# Patient Record
Sex: FEMALE | Race: WHITE | NOT HISPANIC OR LATINO | Employment: PART TIME | URBAN - METROPOLITAN AREA
[De-identification: names, ages, dates, MRNs, and addresses within clinical notes are randomized per-mention and may not be internally consistent; named-entity substitution may affect disease eponyms.]

---

## 2023-10-04 DIAGNOSIS — R05.9 COUGH, UNSPECIFIED TYPE: Primary | ICD-10-CM

## 2023-10-04 RX ORDER — ALBUTEROL SULFATE 90 UG/1
AEROSOL, METERED RESPIRATORY (INHALATION)
Qty: 25.5 G | Refills: 0 | Status: SHIPPED | OUTPATIENT
Start: 2023-10-04

## 2023-11-17 DIAGNOSIS — E03.9 HYPOTHYROIDISM, UNSPECIFIED TYPE: Primary | ICD-10-CM

## 2023-11-17 RX ORDER — LEVOTHYROXINE SODIUM 100 UG/1
100 TABLET ORAL
Qty: 90 TABLET | Refills: 1 | Status: SHIPPED | OUTPATIENT
Start: 2023-11-17

## 2023-12-14 ENCOUNTER — TELEPHONE (OUTPATIENT)
Dept: PRIMARY CARE | Facility: CLINIC | Age: 58
End: 2023-12-14

## 2023-12-14 DIAGNOSIS — L40.9 PSORIASIS: Primary | ICD-10-CM

## 2023-12-14 NOTE — TELEPHONE ENCOUNTER
Patient's psoriasis has flared up again. She is wondering she could get a prescription for the antibiotic and steroid that you had prescribed in the past. [facility] in [place].

## 2023-12-15 RX ORDER — DOXYCYCLINE 100 MG/1
100 CAPSULE ORAL 2 TIMES DAILY
Qty: 20 CAPSULE | Refills: 0 | Status: SHIPPED | OUTPATIENT
Start: 2023-12-15 | End: 2023-12-25

## 2023-12-15 RX ORDER — METHYLPREDNISOLONE 4 MG/1
TABLET ORAL
Qty: 21 TABLET | Refills: 0 | Status: SHIPPED | OUTPATIENT
Start: 2023-12-15 | End: 2023-12-22

## 2024-05-01 DIAGNOSIS — F41.9 ANXIETY: Primary | ICD-10-CM

## 2024-05-01 RX ORDER — ESCITALOPRAM OXALATE 10 MG/1
10 TABLET ORAL DAILY
Qty: 90 TABLET | Refills: 0 | Status: SHIPPED | OUTPATIENT
Start: 2024-05-01

## 2024-05-17 ENCOUNTER — APPOINTMENT (OUTPATIENT)
Dept: PRIMARY CARE | Facility: CLINIC | Age: 59
End: 2024-05-17

## 2024-05-22 ENCOUNTER — APPOINTMENT (OUTPATIENT)
Dept: PRIMARY CARE | Facility: CLINIC | Age: 59
End: 2024-05-22
Payer: COMMERCIAL

## 2024-07-26 ENCOUNTER — TELEPHONE (OUTPATIENT)
Dept: PRIMARY CARE | Facility: CLINIC | Age: 59
End: 2024-07-26
Payer: COMMERCIAL

## 2024-07-26 DIAGNOSIS — L03.115 CELLULITIS OF RIGHT LOWER EXTREMITY: Primary | ICD-10-CM

## 2024-07-26 RX ORDER — DOXYCYCLINE 100 MG/1
100 CAPSULE ORAL 2 TIMES DAILY
Qty: 20 CAPSULE | Refills: 0 | Status: SHIPPED | OUTPATIENT
Start: 2024-07-26 | End: 2024-08-02 | Stop reason: SDUPTHER

## 2024-07-26 RX ORDER — TRIAMCINOLONE ACETONIDE 1 MG/G
CREAM TOPICAL 2 TIMES DAILY
Qty: 80 G | Refills: 1 | Status: SHIPPED | OUTPATIENT
Start: 2024-07-26

## 2024-07-26 NOTE — TELEPHONE ENCOUNTER
Patient is requesting an antibiotic and steroid for the cellulitis in her legs.  First available appointment was 08/02/24 which she will come in for but she is very uncomfortable right now.  She is also requesting a refill of the steroid cream she was prescribed.  Unsure of the name, not on med list.

## 2024-08-02 ENCOUNTER — OFFICE VISIT (OUTPATIENT)
Dept: PRIMARY CARE | Facility: CLINIC | Age: 59
End: 2024-08-02
Payer: COMMERCIAL

## 2024-08-02 VITALS
WEIGHT: 279 LBS | SYSTOLIC BLOOD PRESSURE: 154 MMHG | HEIGHT: 62 IN | BODY MASS INDEX: 51.34 KG/M2 | HEART RATE: 96 BPM | DIASTOLIC BLOOD PRESSURE: 64 MMHG | OXYGEN SATURATION: 94 %

## 2024-08-02 DIAGNOSIS — F41.9 ANXIETY: ICD-10-CM

## 2024-08-02 DIAGNOSIS — E11.9 TYPE 2 DIABETES MELLITUS WITHOUT COMPLICATION, WITHOUT LONG-TERM CURRENT USE OF INSULIN (MULTI): ICD-10-CM

## 2024-08-02 DIAGNOSIS — Z12.31 SCREENING MAMMOGRAM FOR BREAST CANCER: ICD-10-CM

## 2024-08-02 DIAGNOSIS — E03.9 HYPOTHYROIDISM, UNSPECIFIED TYPE: ICD-10-CM

## 2024-08-02 DIAGNOSIS — L03.115 CELLULITIS OF RIGHT LOWER EXTREMITY: ICD-10-CM

## 2024-08-02 DIAGNOSIS — R60.0 BILATERAL LEG EDEMA: ICD-10-CM

## 2024-08-02 DIAGNOSIS — Z12.31 BREAST CANCER SCREENING BY MAMMOGRAM: ICD-10-CM

## 2024-08-02 DIAGNOSIS — R05.9 COUGH, UNSPECIFIED TYPE: ICD-10-CM

## 2024-08-02 DIAGNOSIS — L40.9 PSORIASIS: Primary | ICD-10-CM

## 2024-08-02 LAB — POC HEMOGLOBIN A1C: 6.9 % (ref 4.2–6.5)

## 2024-08-02 PROCEDURE — 3077F SYST BP >= 140 MM HG: CPT | Performed by: PHYSICIAN ASSISTANT

## 2024-08-02 PROCEDURE — 3008F BODY MASS INDEX DOCD: CPT | Performed by: PHYSICIAN ASSISTANT

## 2024-08-02 PROCEDURE — 3078F DIAST BP <80 MM HG: CPT | Performed by: PHYSICIAN ASSISTANT

## 2024-08-02 PROCEDURE — 4010F ACE/ARB THERAPY RXD/TAKEN: CPT | Performed by: PHYSICIAN ASSISTANT

## 2024-08-02 PROCEDURE — 99214 OFFICE O/P EST MOD 30 MIN: CPT | Performed by: PHYSICIAN ASSISTANT

## 2024-08-02 PROCEDURE — 83036 HEMOGLOBIN GLYCOSYLATED A1C: CPT | Performed by: PHYSICIAN ASSISTANT

## 2024-08-02 RX ORDER — SIMVASTATIN 40 MG/1
40 TABLET, FILM COATED ORAL EVERY 24 HOURS
Qty: 90 TABLET | Refills: 1 | Status: SHIPPED | OUTPATIENT
Start: 2024-08-02

## 2024-08-02 RX ORDER — METFORMIN HYDROCHLORIDE 500 MG/1
500 TABLET ORAL
Qty: 180 TABLET | Refills: 1 | Status: SHIPPED | OUTPATIENT
Start: 2024-08-02

## 2024-08-02 RX ORDER — SIMVASTATIN 40 MG/1
40 TABLET, FILM COATED ORAL EVERY 24 HOURS
COMMUNITY
End: 2024-08-02 | Stop reason: SDUPTHER

## 2024-08-02 RX ORDER — FLUTICASONE PROPIONATE AND SALMETEROL 500; 50 UG/1; UG/1
POWDER RESPIRATORY (INHALATION)
COMMUNITY
End: 2024-08-02 | Stop reason: SDUPTHER

## 2024-08-02 RX ORDER — POTASSIUM CHLORIDE 750 MG/1
CAPSULE, EXTENDED RELEASE ORAL
COMMUNITY
End: 2024-08-02 | Stop reason: SDUPTHER

## 2024-08-02 RX ORDER — ESCITALOPRAM OXALATE 10 MG/1
10 TABLET ORAL DAILY
Qty: 90 TABLET | Refills: 0 | Status: SHIPPED | OUTPATIENT
Start: 2024-08-02

## 2024-08-02 RX ORDER — SPIRONOLACTONE 100 MG/1
100 TABLET, FILM COATED ORAL DAILY
Qty: 90 TABLET | Refills: 1 | Status: SHIPPED | OUTPATIENT
Start: 2024-08-02

## 2024-08-02 RX ORDER — TIRZEPATIDE 5 MG/.5ML
5 INJECTION, SOLUTION SUBCUTANEOUS
Qty: 2 ML | Refills: 2 | Status: SHIPPED | OUTPATIENT
Start: 2024-09-01

## 2024-08-02 RX ORDER — LEVOTHYROXINE SODIUM 100 UG/1
100 TABLET ORAL
Qty: 90 TABLET | Refills: 1 | Status: SHIPPED | OUTPATIENT
Start: 2024-08-02

## 2024-08-02 RX ORDER — FLUTICASONE PROPIONATE AND SALMETEROL 500; 50 UG/1; UG/1
1 POWDER RESPIRATORY (INHALATION)
Qty: 60 EACH | Refills: 3 | Status: SHIPPED | OUTPATIENT
Start: 2024-08-02 | End: 2025-08-02

## 2024-08-02 RX ORDER — LOSARTAN POTASSIUM 50 MG/1
50 TABLET ORAL DAILY
Qty: 90 TABLET | Refills: 3 | Status: SHIPPED | OUTPATIENT
Start: 2024-08-02 | End: 2025-08-02

## 2024-08-02 RX ORDER — DOXYCYCLINE 100 MG/1
100 CAPSULE ORAL 2 TIMES DAILY
Qty: 20 CAPSULE | Refills: 0 | Status: SHIPPED | OUTPATIENT
Start: 2024-08-02 | End: 2024-08-12

## 2024-08-02 RX ORDER — TIRZEPATIDE 2.5 MG/.5ML
2.5 INJECTION, SOLUTION SUBCUTANEOUS
Qty: 2 ML | Refills: 0 | Status: SHIPPED | OUTPATIENT
Start: 2024-08-04

## 2024-08-02 RX ORDER — METFORMIN HYDROCHLORIDE 500 MG/1
500 TABLET ORAL
COMMUNITY
End: 2024-08-02 | Stop reason: SDUPTHER

## 2024-08-02 RX ORDER — LOSARTAN POTASSIUM 50 MG/1
50 TABLET ORAL
COMMUNITY
End: 2024-08-02 | Stop reason: SDUPTHER

## 2024-08-02 RX ORDER — APREMILAST 10-20-30MG
1 KIT ORAL 2 TIMES DAILY
Qty: 55 EACH | Refills: 0 | Status: SHIPPED | OUTPATIENT
Start: 2024-08-02 | End: 2024-08-30

## 2024-08-02 RX ORDER — SPIRONOLACTONE 100 MG/1
100 TABLET, FILM COATED ORAL DAILY
COMMUNITY
End: 2024-08-02 | Stop reason: SDUPTHER

## 2024-08-02 RX ORDER — FUROSEMIDE 40 MG/1
40 TABLET ORAL 2 TIMES DAILY
COMMUNITY
End: 2024-08-02 | Stop reason: SDUPTHER

## 2024-08-02 RX ORDER — FUROSEMIDE 40 MG/1
40 TABLET ORAL 2 TIMES DAILY
Qty: 90 TABLET | Refills: 1 | Status: SHIPPED | OUTPATIENT
Start: 2024-08-02

## 2024-08-02 RX ORDER — ALBUTEROL SULFATE 90 UG/1
2 AEROSOL, METERED RESPIRATORY (INHALATION) EVERY 6 HOURS PRN
Qty: 25.5 G | Refills: 0 | Status: SHIPPED | OUTPATIENT
Start: 2024-08-02

## 2024-08-02 RX ORDER — POTASSIUM CHLORIDE 750 MG/1
10 CAPSULE, EXTENDED RELEASE ORAL DAILY
Qty: 90 CAPSULE | Refills: 1 | Status: SHIPPED | OUTPATIENT
Start: 2024-08-02

## 2024-08-02 ASSESSMENT — PATIENT HEALTH QUESTIONNAIRE - PHQ9
SUM OF ALL RESPONSES TO PHQ9 QUESTIONS 1 AND 2: 0
1. LITTLE INTEREST OR PLEASURE IN DOING THINGS: NOT AT ALL
2. FEELING DOWN, DEPRESSED OR HOPELESS: NOT AT ALL

## 2024-08-02 ASSESSMENT — PAIN SCALES - GENERAL: PAINLEVEL: 3

## 2024-08-02 NOTE — PROGRESS NOTES
"Subjective   Patient ID: Kay Pike is a 59 y.o. female who presents for cellulitis.    60 y/o seen for follow up -   Currently with cellulitis to LE due to chronic lymphedema.   Due for A1C, labs.   Has had lapse in medications due to prior lapse in insurance. Was caring for her  since passed away.            Review of Systems   All other systems reviewed and are negative.      Objective   /64   Pulse 96   Ht 1.575 m (5' 2\")   Wt 127 kg (279 lb)   SpO2 94%   BMI 51.03 kg/m²     Physical Exam  Constitutional:       Appearance: Normal appearance. She is obese.   Cardiovascular:      Rate and Rhythm: Normal rate and regular rhythm.   Neurological:      Mental Status: She is alert.         Assessment/Plan   Diagnoses and all orders for this visit:  Psoriasis  -     apremilast (Otezla Starter) 10 mg (4)-20 mg (4)-30 mg (47) tablets,dose pack tablet therapy pack; Take 1 tablet by mouth 2 times a day for 28 days. Do not crush, chew, or split tablets.  Type 2 diabetes mellitus without complication, without long-term current use of insulin (Multi)  -     POCT glycosylated hemoglobin (Hb A1C) manually resulted  -     simvastatin (Zocor) 40 mg tablet; Take 1 tablet (40 mg) by mouth once every 24 hours.  -     losartan (Cozaar) 50 mg tablet; Take 1 tablet (50 mg) by mouth once daily.  -     metFORMIN (Glucophage) 500 mg tablet; Take 1 tablet (500 mg) by mouth 2 times daily (morning and late afternoon).  -     tirzepatide (Mounjaro) 2.5 mg/0.5 mL pen injector; Inject 2.5 mg under the skin 1 (one) time per week.  -     Comprehensive metabolic panel; Future  -     Lipid panel; Future  -     Hemoglobin A1c; Future  -     tirzepatide (Mounjaro) 5 mg/0.5 mL pen injector; Inject 5 mg under the skin 1 (one) time per week. Do not fill before September 1, 2024.  Hypothyroidism, unspecified type  -     levothyroxine (Synthroid, Levoxyl) 100 mcg tablet; Take 1 tablet (100 mcg) by mouth once daily in the morning. Take " before meals. Take on an empty stomach  Cellulitis of right lower extremity  -     doxycycline (Vibramycin) 100 mg capsule; Take 1 capsule (100 mg) by mouth 2 times a day for 10 days. Take with at least 8 ounces (large glass) of water, do not lie down for 30 minutes after  Anxiety  -     escitalopram (Lexapro) 10 mg tablet; Take 1 tablet (10 mg) by mouth once daily.  Cough, unspecified type  -     fluticasone propion-salmeteroL (Advair Diskus) 500-50 mcg/dose diskus inhaler; Inhale 1 puff 2 times a day.  -     albuterol 90 mcg/actuation inhaler; Inhale 2 puffs every 6 hours if needed for wheezing.  Bilateral leg edema  -     spironolactone (Aldactone) 100 mg tablet; Take 1 tablet (100 mg) by mouth once daily.  -     potassium chloride ER (Micro-K) 10 mEq ER capsule; Take 1 capsule (10 mEq) by mouth once daily. Do not crush or chew.  -     furosemide (Lasix) 40 mg tablet; Take 1 tablet (40 mg) by mouth 2 times a day.  Breast cancer screening by mammogram  Screening mammogram for breast cancer  -     BI mammo bilateral screening tomosynthesis; Future

## 2024-08-05 NOTE — TELEPHONE ENCOUNTER
[facility] called stating medication   apremilast (Otezla Starter) 10 mg (4)-20 mg (4)-30 mg (47) tablets,dose pack tablet therapy pack     Instructions state to take x2 daily --- pharmacist states usually they only take x1 on the first day --- verifying directions

## 2024-08-21 ENCOUNTER — TELEPHONE (OUTPATIENT)
Dept: PRIMARY CARE | Facility: CLINIC | Age: 59
End: 2024-08-21
Payer: COMMERCIAL

## 2024-08-21 DIAGNOSIS — E11.9 TYPE 2 DIABETES MELLITUS WITHOUT COMPLICATION, WITHOUT LONG-TERM CURRENT USE OF INSULIN (MULTI): ICD-10-CM

## 2024-08-21 RX ORDER — TIRZEPATIDE 2.5 MG/.5ML
2.5 INJECTION, SOLUTION SUBCUTANEOUS
Qty: 2 ML | Refills: 0 | Status: SHIPPED | OUTPATIENT
Start: 2024-08-25

## 2024-08-21 NOTE — TELEPHONE ENCOUNTER
[facility] called stating medication   tirzepatide (Mounjaro) 5 mg/0.5 mL pen injector   Insurance would not be covered     However, they state if the patient goes through [facility] insurance will cover medication -- [facility] can be reached at [phone]

## 2024-08-26 ENCOUNTER — APPOINTMENT (OUTPATIENT)
Dept: PRIMARY CARE | Facility: CLINIC | Age: 59
End: 2024-08-26
Payer: COMMERCIAL

## 2024-09-13 ENCOUNTER — OFFICE VISIT (OUTPATIENT)
Dept: PRIMARY CARE | Facility: CLINIC | Age: 59
End: 2024-09-13
Payer: COMMERCIAL

## 2024-09-13 VITALS
OXYGEN SATURATION: 93 % | WEIGHT: 279.4 LBS | HEART RATE: 102 BPM | SYSTOLIC BLOOD PRESSURE: 142 MMHG | DIASTOLIC BLOOD PRESSURE: 68 MMHG | HEIGHT: 62 IN | BODY MASS INDEX: 51.41 KG/M2

## 2024-09-13 DIAGNOSIS — E03.9 HYPOTHYROIDISM, UNSPECIFIED TYPE: ICD-10-CM

## 2024-09-13 DIAGNOSIS — R60.0 BILATERAL LEG EDEMA: ICD-10-CM

## 2024-09-13 DIAGNOSIS — L03.115 CELLULITIS OF RIGHT LOWER EXTREMITY: ICD-10-CM

## 2024-09-13 DIAGNOSIS — E11.9 TYPE 2 DIABETES MELLITUS WITHOUT COMPLICATION, WITHOUT LONG-TERM CURRENT USE OF INSULIN (MULTI): ICD-10-CM

## 2024-09-13 PROCEDURE — 99214 OFFICE O/P EST MOD 30 MIN: CPT | Performed by: PHYSICIAN ASSISTANT

## 2024-09-13 PROCEDURE — 4010F ACE/ARB THERAPY RXD/TAKEN: CPT | Performed by: PHYSICIAN ASSISTANT

## 2024-09-13 PROCEDURE — 3077F SYST BP >= 140 MM HG: CPT | Performed by: PHYSICIAN ASSISTANT

## 2024-09-13 PROCEDURE — 3008F BODY MASS INDEX DOCD: CPT | Performed by: PHYSICIAN ASSISTANT

## 2024-09-13 PROCEDURE — 3078F DIAST BP <80 MM HG: CPT | Performed by: PHYSICIAN ASSISTANT

## 2024-09-13 RX ORDER — LOSARTAN POTASSIUM 50 MG/1
50 TABLET ORAL DAILY
Qty: 90 TABLET | Refills: 3 | Status: SHIPPED | OUTPATIENT
Start: 2024-09-13 | End: 2025-09-13

## 2024-09-13 RX ORDER — LEVOTHYROXINE SODIUM 100 UG/1
100 TABLET ORAL
Qty: 90 TABLET | Refills: 1 | Status: SHIPPED | OUTPATIENT
Start: 2024-09-13

## 2024-09-13 RX ORDER — TRIAMCINOLONE ACETONIDE 1 MG/G
CREAM TOPICAL 2 TIMES DAILY
Qty: 454 G | Refills: 3 | Status: SHIPPED | OUTPATIENT
Start: 2024-09-13

## 2024-09-13 RX ORDER — TIRZEPATIDE 2.5 MG/.5ML
2.5 INJECTION, SOLUTION SUBCUTANEOUS
Qty: 2 ML | Refills: 0 | Status: SHIPPED | OUTPATIENT
Start: 2024-09-15

## 2024-09-13 RX ORDER — TIRZEPATIDE 5 MG/.5ML
5 INJECTION, SOLUTION SUBCUTANEOUS
Qty: 2 ML | Refills: 2 | Status: SHIPPED | OUTPATIENT
Start: 2024-09-15

## 2024-09-13 RX ORDER — SPIRONOLACTONE 100 MG/1
100 TABLET, FILM COATED ORAL DAILY
Qty: 90 TABLET | Refills: 1 | Status: SHIPPED | OUTPATIENT
Start: 2024-09-13

## 2024-09-13 RX ORDER — SILVER SULFADIAZINE 10 G/1000G
CREAM TOPICAL
Qty: 400 G | Refills: 1 | Status: SHIPPED | OUTPATIENT
Start: 2024-09-13 | End: 2024-11-12

## 2024-09-13 RX ORDER — DOXYCYCLINE 100 MG/1
100 CAPSULE ORAL 2 TIMES DAILY
Qty: 42 CAPSULE | Refills: 0 | Status: SHIPPED | OUTPATIENT
Start: 2024-09-13 | End: 2024-10-04

## 2024-09-13 ASSESSMENT — PAIN SCALES - GENERAL: PAINLEVEL: 8

## 2024-09-13 NOTE — PROGRESS NOTES
"Subjective   Patient ID: Kay Pike is a 59 y.o. female who presents for Leg Swelling.    58 y/o seen for follow up -   Currently with cellulitis to LE due to chronic lymphedema.     States that she was not able to  some medications. Did complete antibiotics recently for her legs. Since then her L gluteal region has started to swell. 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 Reports a gland that has gotten irritated. Current swelling is in lower gluteal region. Has had fever/chills this week, last measured 2 days ago.     Also has not picked up all medication - may have been mixup at pharmacy.          Review of Systems   All other systems reviewed and are negative.      Objective   /68   Pulse 102   Ht 1.575 m (5' 2\")   Wt 127 kg (279 lb 6.4 oz)   SpO2 93%   BMI 51.10 kg/m²     Physical Exam  Constitutional:       Appearance: Normal appearance. She is obese.   Musculoskeletal:      Comments: B/L LE lymphedema; redness LLE and warmth. tender   Neurological:      Mental Status: She is alert.         Assessment/Plan   Diagnoses and all orders for this visit:  Cellulitis of right lower extremity  -     triamcinolone (Kenalog) 0.1 % cream; Apply topically 2 times a day. Apply to affected area 1-2 times daily as needed.  Type 2 diabetes mellitus without complication, without long-term current use of insulin (Multi)  -     losartan (Cozaar) 50 mg tablet; Take 1 tablet (50 mg) by mouth once daily.  -     tirzepatide (Mounjaro) 2.5 mg/0.5 mL pen injector; Inject 2.5 mg under the skin 1 (one) time per week.  -     tirzepatide (Mounjaro) 5 mg/0.5 mL pen injector; Inject 5 mg under the skin 1 (one) time per week.  Bilateral leg edema  -     spironolactone (Aldactone) 100 mg tablet; Take 1 tablet (100 mg) by mouth once daily.  Hypothyroidism, unspecified type  -     levothyroxine (Synthroid, Levoxyl) 100 mcg tablet; Take 1 tablet (100 mcg) by mouth once daily in the morning. Take " before meals. Take on an empty stomach    2 week recheck

## 2024-10-03 ENCOUNTER — TELEPHONE (OUTPATIENT)
Dept: PRIMARY CARE | Facility: CLINIC | Age: 59
End: 2024-10-03
Payer: COMMERCIAL

## 2024-10-03 DIAGNOSIS — L40.9 PSORIASIS: Primary | ICD-10-CM

## 2024-10-03 NOTE — TELEPHONE ENCOUNTER
Patient is requesting a course of steroids to take down the swelling from her cellulitis.  She stated it has caused her psoriasis to flare up.  She is scheduled for a follow up with you on 10/11/24 but feels that is too long to wait.

## 2024-10-04 RX ORDER — METHYLPREDNISOLONE 4 MG/1
TABLET ORAL
Qty: 21 TABLET | Refills: 0 | Status: SHIPPED | OUTPATIENT
Start: 2024-10-04 | End: 2024-10-11

## 2024-10-10 ENCOUNTER — TELEPHONE (OUTPATIENT)
Dept: PRIMARY CARE | Facility: CLINIC | Age: 59
End: 2024-10-10
Payer: COMMERCIAL

## 2024-10-10 ENCOUNTER — HOSPITAL ENCOUNTER (INPATIENT)
Facility: HOSPITAL | Age: 59
DRG: 280 | End: 2024-10-10
Attending: STUDENT IN AN ORGANIZED HEALTH CARE EDUCATION/TRAINING PROGRAM | Admitting: INTERNAL MEDICINE
Payer: COMMERCIAL

## 2024-10-10 ENCOUNTER — APPOINTMENT (OUTPATIENT)
Dept: CARDIOLOGY | Facility: HOSPITAL | Age: 59
DRG: 280 | End: 2024-10-10
Payer: COMMERCIAL

## 2024-10-10 ENCOUNTER — APPOINTMENT (OUTPATIENT)
Dept: RADIOLOGY | Facility: HOSPITAL | Age: 59
DRG: 280 | End: 2024-10-10
Payer: COMMERCIAL

## 2024-10-10 DIAGNOSIS — R94.31 ST ELEVATION: ICD-10-CM

## 2024-10-10 DIAGNOSIS — J18.9 PNEUMONIA OF BOTH LOWER LOBES DUE TO INFECTIOUS ORGANISM: ICD-10-CM

## 2024-10-10 DIAGNOSIS — I50.21 ACUTE SYSTOLIC HEART FAILURE: ICD-10-CM

## 2024-10-10 DIAGNOSIS — I21.29 ST ELEVATION (STEMI) MYOCARDIAL INFARCTION INVOLVING OTHER SITES: ICD-10-CM

## 2024-10-10 DIAGNOSIS — R60.0 BILATERAL LEG EDEMA: ICD-10-CM

## 2024-10-10 DIAGNOSIS — J44.1 COPD WITH ACUTE EXACERBATION (MULTI): Primary | ICD-10-CM

## 2024-10-10 DIAGNOSIS — I48.0 PAROXYSMAL ATRIAL FIBRILLATION (MULTI): ICD-10-CM

## 2024-10-10 DIAGNOSIS — R06.02 SHORTNESS OF BREATH: Primary | ICD-10-CM

## 2024-10-10 DIAGNOSIS — I21.3 STEMI (ST ELEVATION MYOCARDIAL INFARCTION) (MULTI): ICD-10-CM

## 2024-10-10 DIAGNOSIS — G47.33 OBSTRUCTIVE SLEEP APNEA SYNDROME: ICD-10-CM

## 2024-10-10 DIAGNOSIS — J44.0 COPD (CHRONIC OBSTRUCTIVE PULMONARY DISEASE) WITH ACUTE BRONCHITIS (MULTI): ICD-10-CM

## 2024-10-10 DIAGNOSIS — J20.9 COPD (CHRONIC OBSTRUCTIVE PULMONARY DISEASE) WITH ACUTE BRONCHITIS (MULTI): ICD-10-CM

## 2024-10-10 LAB
ABO GROUP (TYPE) IN BLOOD: NORMAL
ANION GAP BLDA CALCULATED.4IONS-SCNC: 6 MMO/L (ref 10–25)
ANION GAP SERPL CALCULATED.3IONS-SCNC: 10 MMOL/L (ref 10–20)
ANTIBODY SCREEN: NORMAL
APPARATUS: ABNORMAL
APTT PPP: 27.6 SECONDS (ref 22–32.5)
ARTERIAL PATENCY WRIST A: POSITIVE
BASE EXCESS BLDA CALC-SCNC: 0.7 MMOL/L (ref -2–3)
BASOPHILS # BLD AUTO: 0.06 X10*3/UL (ref 0–0.1)
BASOPHILS NFR BLD AUTO: 0.4 %
BNP SERPL-MCNC: 618 PG/ML (ref 0–99)
BODY TEMPERATURE: 37 DEGREES CELSIUS
BUN SERPL-MCNC: 18 MG/DL (ref 6–23)
CA-I BLDA-SCNC: 1.13 MMOL/L (ref 1.1–1.33)
CALCIUM SERPL-MCNC: 8.6 MG/DL (ref 8.6–10.3)
CARDIAC TROPONIN I PNL SERPL HS: 24 NG/L (ref 0–13)
CARDIAC TROPONIN I PNL SERPL HS: 24 NG/L (ref 0–13)
CHLORIDE BLDA-SCNC: 97 MMOL/L (ref 98–107)
CHLORIDE SERPL-SCNC: 99 MMOL/L (ref 98–107)
CO2 SERPL-SCNC: 29 MMOL/L (ref 21–32)
CREAT SERPL-MCNC: 0.7 MG/DL (ref 0.5–1.05)
CRITICAL CALL TIME: 2311
CRITICAL CALLED BY: ABNORMAL
CRITICAL CALLED TO: ABNORMAL
CRITICAL READ BACK: ABNORMAL
EGFRCR SERPLBLD CKD-EPI 2021: >90 ML/MIN/1.73M*2
EOSINOPHIL # BLD AUTO: 0.08 X10*3/UL (ref 0–0.7)
EOSINOPHIL NFR BLD AUTO: 0.5 %
EPAP CMH2O: 10 CM H2O
ERYTHROCYTE [DISTWIDTH] IN BLOOD BY AUTOMATED COUNT: 19 % (ref 11.5–14.5)
GLUCOSE BLD MANUAL STRIP-MCNC: 198 MG/DL (ref 74–99)
GLUCOSE BLDA-MCNC: 199 MG/DL (ref 74–99)
GLUCOSE SERPL-MCNC: 139 MG/DL (ref 74–99)
HCO3 BLDA-SCNC: 30.4 MMOL/L (ref 22–26)
HCT VFR BLD AUTO: 46.5 % (ref 36–46)
HCT VFR BLD EST: 46 % (ref 36–46)
HGB BLD-MCNC: 15.6 G/DL (ref 12–16)
HGB BLDA-MCNC: 15.4 G/DL (ref 12–16)
IMM GRANULOCYTES # BLD AUTO: 0.7 X10*3/UL (ref 0–0.7)
IMM GRANULOCYTES NFR BLD AUTO: 4.2 % (ref 0–0.9)
INHALED O2 CONCENTRATION: 65 %
INR PPP: 1.1 (ref 0.9–1.2)
IPAP CMH2O: 14 CM H2O
LACTATE BLDA-SCNC: 0.7 MMOL/L (ref 0.4–2)
LYMPHOCYTES # BLD AUTO: 1.27 X10*3/UL (ref 1.2–4.8)
LYMPHOCYTES NFR BLD AUTO: 7.6 %
MCH RBC QN AUTO: 27.2 PG (ref 26–34)
MCHC RBC AUTO-ENTMCNC: 33.5 G/DL (ref 32–36)
MCV RBC AUTO: 81 FL (ref 80–100)
MONOCYTES # BLD AUTO: 0.89 X10*3/UL (ref 0.1–1)
MONOCYTES NFR BLD AUTO: 5.3 %
NEUTROPHILS # BLD AUTO: 13.68 X10*3/UL (ref 1.2–7.7)
NEUTROPHILS NFR BLD AUTO: 82 %
NRBC BLD-RTO: 0 /100 WBCS (ref 0–0)
OXYHGB MFR BLDA: 89.4 % (ref 94–98)
PCO2 BLDA: 71 MM HG (ref 38–42)
PH BLDA: 7.24 PH (ref 7.38–7.42)
PLATELET # BLD AUTO: 149 X10*3/UL (ref 150–450)
PO2 BLDA: 123 MM HG (ref 85–95)
POTASSIUM BLDA-SCNC: 3.9 MMOL/L (ref 3.5–5.3)
POTASSIUM SERPL-SCNC: 4.2 MMOL/L (ref 3.5–5.3)
PROTHROMBIN TIME: 11.7 SECONDS (ref 9.3–12.7)
RBC # BLD AUTO: 5.74 X10*6/UL (ref 4–5.2)
RH FACTOR (ANTIGEN D): NORMAL
SAO2 % BLDA: 99 % (ref 94–100)
SODIUM BLDA-SCNC: 129 MMOL/L (ref 136–145)
SODIUM SERPL-SCNC: 134 MMOL/L (ref 136–145)
SPECIMEN DRAWN FROM PATIENT: ABNORMAL
VENTILATOR MODE: ABNORMAL
VENTILATOR RATE: 12 BPM
WBC # BLD AUTO: 16.7 X10*3/UL (ref 4.4–11.3)

## 2024-10-10 PROCEDURE — 99291 CRITICAL CARE FIRST HOUR: CPT

## 2024-10-10 PROCEDURE — 86901 BLOOD TYPING SEROLOGIC RH(D): CPT | Performed by: STUDENT IN AN ORGANIZED HEALTH CARE EDUCATION/TRAINING PROGRAM

## 2024-10-10 PROCEDURE — B2111ZZ FLUOROSCOPY OF MULTIPLE CORONARY ARTERIES USING LOW OSMOLAR CONTRAST: ICD-10-PCS | Performed by: INTERNAL MEDICINE

## 2024-10-10 PROCEDURE — 96374 THER/PROPH/DIAG INJ IV PUSH: CPT

## 2024-10-10 PROCEDURE — 4A023N7 MEASUREMENT OF CARDIAC SAMPLING AND PRESSURE, LEFT HEART, PERCUTANEOUS APPROACH: ICD-10-PCS | Performed by: INTERNAL MEDICINE

## 2024-10-10 PROCEDURE — 36415 COLL VENOUS BLD VENIPUNCTURE: CPT | Performed by: STUDENT IN AN ORGANIZED HEALTH CARE EDUCATION/TRAINING PROGRAM

## 2024-10-10 PROCEDURE — 82374 ASSAY BLOOD CARBON DIOXIDE: CPT | Performed by: STUDENT IN AN ORGANIZED HEALTH CARE EDUCATION/TRAINING PROGRAM

## 2024-10-10 PROCEDURE — 93454 CORONARY ARTERY ANGIO S&I: CPT | Performed by: INTERNAL MEDICINE

## 2024-10-10 PROCEDURE — 94640 AIRWAY INHALATION TREATMENT: CPT

## 2024-10-10 PROCEDURE — 2550000001 HC RX 255 CONTRASTS

## 2024-10-10 PROCEDURE — 93005 ELECTROCARDIOGRAM TRACING: CPT

## 2024-10-10 PROCEDURE — 2500000004 HC RX 250 GENERAL PHARMACY W/ HCPCS (ALT 636 FOR OP/ED): Performed by: STUDENT IN AN ORGANIZED HEALTH CARE EDUCATION/TRAINING PROGRAM

## 2024-10-10 PROCEDURE — C1760 CLOSURE DEV, VASC: HCPCS | Performed by: INTERNAL MEDICINE

## 2024-10-10 PROCEDURE — 2500000004 HC RX 250 GENERAL PHARMACY W/ HCPCS (ALT 636 FOR OP/ED): Performed by: INTERNAL MEDICINE

## 2024-10-10 PROCEDURE — 2020000001 HC ICU ROOM DAILY

## 2024-10-10 PROCEDURE — 85730 THROMBOPLASTIN TIME PARTIAL: CPT | Performed by: STUDENT IN AN ORGANIZED HEALTH CARE EDUCATION/TRAINING PROGRAM

## 2024-10-10 PROCEDURE — 94660 CPAP INITIATION&MGMT: CPT

## 2024-10-10 PROCEDURE — 71045 X-RAY EXAM CHEST 1 VIEW: CPT

## 2024-10-10 PROCEDURE — 82947 ASSAY GLUCOSE BLOOD QUANT: CPT

## 2024-10-10 PROCEDURE — 84484 ASSAY OF TROPONIN QUANT: CPT | Performed by: STUDENT IN AN ORGANIZED HEALTH CARE EDUCATION/TRAINING PROGRAM

## 2024-10-10 PROCEDURE — 71045 X-RAY EXAM CHEST 1 VIEW: CPT | Performed by: RADIOLOGY

## 2024-10-10 PROCEDURE — 85025 COMPLETE CBC W/AUTO DIFF WBC: CPT | Performed by: STUDENT IN AN ORGANIZED HEALTH CARE EDUCATION/TRAINING PROGRAM

## 2024-10-10 PROCEDURE — 2720000007 HC OR 272 NO HCPCS: Performed by: INTERNAL MEDICINE

## 2024-10-10 PROCEDURE — 2500000002 HC RX 250 W HCPCS SELF ADMINISTERED DRUGS (ALT 637 FOR MEDICARE OP, ALT 636 FOR OP/ED): Performed by: STUDENT IN AN ORGANIZED HEALTH CARE EDUCATION/TRAINING PROGRAM

## 2024-10-10 PROCEDURE — 2550000001 HC RX 255 CONTRASTS: Performed by: INTERNAL MEDICINE

## 2024-10-10 PROCEDURE — C1894 INTRO/SHEATH, NON-LASER: HCPCS | Performed by: INTERNAL MEDICINE

## 2024-10-10 PROCEDURE — 99285 EMERGENCY DEPT VISIT HI MDM: CPT | Mod: 25

## 2024-10-10 PROCEDURE — 36600 WITHDRAWAL OF ARTERIAL BLOOD: CPT

## 2024-10-10 PROCEDURE — 94799 UNLISTED PULMONARY SVC/PX: CPT

## 2024-10-10 PROCEDURE — 2500000004 HC RX 250 GENERAL PHARMACY W/ HCPCS (ALT 636 FOR OP/ED)

## 2024-10-10 PROCEDURE — 82810 BLOOD GASES O2 SAT ONLY: CPT

## 2024-10-10 PROCEDURE — 94664 DEMO&/EVAL PT USE INHALER: CPT

## 2024-10-10 PROCEDURE — C1887 CATHETER, GUIDING: HCPCS | Performed by: INTERNAL MEDICINE

## 2024-10-10 PROCEDURE — 83880 ASSAY OF NATRIURETIC PEPTIDE: CPT | Performed by: STUDENT IN AN ORGANIZED HEALTH CARE EDUCATION/TRAINING PROGRAM

## 2024-10-10 PROCEDURE — 9420000001 HC RT PATIENT EDUCATION 5 MIN

## 2024-10-10 PROCEDURE — 2500000001 HC RX 250 WO HCPCS SELF ADMINISTERED DRUGS (ALT 637 FOR MEDICARE OP)

## 2024-10-10 PROCEDURE — 93010 ELECTROCARDIOGRAM REPORT: CPT | Performed by: INTERNAL MEDICINE

## 2024-10-10 PROCEDURE — 2500000002 HC RX 250 W HCPCS SELF ADMINISTERED DRUGS (ALT 637 FOR MEDICARE OP, ALT 636 FOR OP/ED)

## 2024-10-10 PROCEDURE — 99223 1ST HOSP IP/OBS HIGH 75: CPT | Performed by: INTERNAL MEDICINE

## 2024-10-10 PROCEDURE — 2500000001 HC RX 250 WO HCPCS SELF ADMINISTERED DRUGS (ALT 637 FOR MEDICARE OP): Performed by: STUDENT IN AN ORGANIZED HEALTH CARE EDUCATION/TRAINING PROGRAM

## 2024-10-10 PROCEDURE — 2780000003 HC OR 278 NO HCPCS: Performed by: INTERNAL MEDICINE

## 2024-10-10 RX ORDER — IODIXANOL 320 MG/ML
INJECTION, SOLUTION INTRAVASCULAR AS NEEDED
Status: DISCONTINUED | OUTPATIENT
Start: 2024-10-10 | End: 2024-10-11 | Stop reason: HOSPADM

## 2024-10-10 RX ORDER — FLUTICASONE FUROATE AND VILANTEROL 200; 25 UG/1; UG/1
1 POWDER RESPIRATORY (INHALATION)
Status: DISCONTINUED | OUTPATIENT
Start: 2024-10-11 | End: 2024-10-16

## 2024-10-10 RX ORDER — LIDOCAINE HYDROCHLORIDE AND EPINEPHRINE 20; 10 MG/ML; UG/ML
3 INJECTION, SOLUTION INFILTRATION; PERINEURAL ONCE AS NEEDED
Status: DISCONTINUED | OUTPATIENT
Start: 2024-10-10 | End: 2024-10-24 | Stop reason: HOSPADM

## 2024-10-10 RX ORDER — ACETAMINOPHEN 325 MG/1
650 TABLET ORAL EVERY 6 HOURS PRN
Status: DISCONTINUED | OUTPATIENT
Start: 2024-10-10 | End: 2024-10-24 | Stop reason: HOSPADM

## 2024-10-10 RX ORDER — FUROSEMIDE 10 MG/ML
40 INJECTION INTRAMUSCULAR; INTRAVENOUS ONCE
Status: DISCONTINUED | OUTPATIENT
Start: 2024-10-10 | End: 2024-10-10

## 2024-10-10 RX ORDER — ASPIRIN 81 MG/1
81 TABLET ORAL DAILY
Status: DISCONTINUED | OUTPATIENT
Start: 2024-10-11 | End: 2024-10-15

## 2024-10-10 RX ORDER — FUROSEMIDE 10 MG/ML
40 INJECTION INTRAMUSCULAR; INTRAVENOUS ONCE
Status: COMPLETED | OUTPATIENT
Start: 2024-10-10 | End: 2024-10-10

## 2024-10-10 RX ORDER — INSULIN LISPRO 100 [IU]/ML
0-15 INJECTION, SOLUTION INTRAVENOUS; SUBCUTANEOUS EVERY 4 HOURS
Status: DISCONTINUED | OUTPATIENT
Start: 2024-10-10 | End: 2024-10-12

## 2024-10-10 RX ORDER — HEPARIN SODIUM 5000 [USP'U]/ML
7500 INJECTION, SOLUTION INTRAVENOUS; SUBCUTANEOUS EVERY 8 HOURS SCHEDULED
Status: DISCONTINUED | OUTPATIENT
Start: 2024-10-10 | End: 2024-10-10 | Stop reason: SDUPTHER

## 2024-10-10 RX ORDER — MORPHINE SULFATE 2 MG/ML
1 INJECTION, SOLUTION INTRAMUSCULAR; INTRAVENOUS ONCE
Status: DISCONTINUED | OUTPATIENT
Start: 2024-10-10 | End: 2024-10-14

## 2024-10-10 RX ORDER — NEBULIZER AND COMPRESSOR
1 EACH MISCELLANEOUS AS NEEDED
Qty: 1 EACH | Refills: 0 | Status: ON HOLD | OUTPATIENT
Start: 2024-10-10

## 2024-10-10 RX ORDER — NITROGLYCERIN 0.4 MG/1
0.4 TABLET SUBLINGUAL EVERY 5 MIN PRN
Status: DISCONTINUED | OUTPATIENT
Start: 2024-10-10 | End: 2024-10-14 | Stop reason: SDUPTHER

## 2024-10-10 RX ORDER — ESCITALOPRAM OXALATE 10 MG/1
10 TABLET ORAL DAILY
Status: DISCONTINUED | OUTPATIENT
Start: 2024-10-11 | End: 2024-10-24 | Stop reason: HOSPADM

## 2024-10-10 RX ORDER — IPRATROPIUM BROMIDE AND ALBUTEROL SULFATE 2.5; .5 MG/3ML; MG/3ML
3 SOLUTION RESPIRATORY (INHALATION) EVERY 2 HOUR PRN
Status: DISCONTINUED | OUTPATIENT
Start: 2024-10-10 | End: 2024-10-21

## 2024-10-10 RX ORDER — NITROGLYCERIN 20 MG/100ML
5-200 INJECTION INTRAVENOUS CONTINUOUS
Status: DISCONTINUED | OUTPATIENT
Start: 2024-10-10 | End: 2024-10-11

## 2024-10-10 RX ORDER — LEVOTHYROXINE SODIUM 100 UG/1
100 TABLET ORAL DAILY
Status: DISCONTINUED | OUTPATIENT
Start: 2024-10-11 | End: 2024-10-24 | Stop reason: HOSPADM

## 2024-10-10 RX ORDER — NITROGLYCERIN 0.4 MG/1
0.4 TABLET SUBLINGUAL EVERY 5 MIN PRN
Status: DISCONTINUED | OUTPATIENT
Start: 2024-10-10 | End: 2024-10-24 | Stop reason: HOSPADM

## 2024-10-10 RX ORDER — ROSUVASTATIN CALCIUM 20 MG/1
20 TABLET, COATED ORAL NIGHTLY
Status: DISCONTINUED | OUTPATIENT
Start: 2024-10-10 | End: 2024-10-10 | Stop reason: ALTCHOICE

## 2024-10-10 RX ORDER — NAPROXEN SODIUM 220 MG/1
324 TABLET, FILM COATED ORAL ONCE
Status: COMPLETED | OUTPATIENT
Start: 2024-10-10 | End: 2024-10-10

## 2024-10-10 RX ORDER — FUROSEMIDE 10 MG/ML
40 INJECTION INTRAMUSCULAR; INTRAVENOUS EVERY 12 HOURS
Status: DISCONTINUED | OUTPATIENT
Start: 2024-10-11 | End: 2024-10-11

## 2024-10-10 RX ORDER — DEXTROSE 50 % IN WATER (D50W) INTRAVENOUS SYRINGE
25
Status: DISCONTINUED | OUTPATIENT
Start: 2024-10-10 | End: 2024-10-24 | Stop reason: HOSPADM

## 2024-10-10 RX ORDER — MORPHINE SULFATE 2 MG/ML
2 INJECTION, SOLUTION INTRAMUSCULAR; INTRAVENOUS EVERY 6 HOURS PRN
Status: DISCONTINUED | OUTPATIENT
Start: 2024-10-10 | End: 2024-10-24 | Stop reason: HOSPADM

## 2024-10-10 RX ORDER — MORPHINE SULFATE 2 MG/ML
2 INJECTION, SOLUTION INTRAMUSCULAR; INTRAVENOUS ONCE
Status: DISCONTINUED | OUTPATIENT
Start: 2024-10-10 | End: 2024-10-14

## 2024-10-10 RX ORDER — LORAZEPAM 2 MG/ML
1 INJECTION INTRAMUSCULAR ONCE
Status: COMPLETED | OUTPATIENT
Start: 2024-10-10 | End: 2024-10-10

## 2024-10-10 RX ORDER — LOSARTAN POTASSIUM 50 MG/1
50 TABLET ORAL DAILY
Status: DISCONTINUED | OUTPATIENT
Start: 2024-10-11 | End: 2024-10-11

## 2024-10-10 RX ORDER — SIMVASTATIN 40 MG/1
40 TABLET, FILM COATED ORAL NIGHTLY
Status: DISCONTINUED | OUTPATIENT
Start: 2024-10-10 | End: 2024-10-24 | Stop reason: HOSPADM

## 2024-10-10 RX ORDER — METFORMIN HYDROCHLORIDE 500 MG/1
500 TABLET ORAL
Status: DISCONTINUED | OUTPATIENT
Start: 2024-10-11 | End: 2024-10-16

## 2024-10-10 RX ORDER — LIDOCAINE HYDROCHLORIDE 10 MG/ML
INJECTION, SOLUTION EPIDURAL; INFILTRATION; INTRACAUDAL; PERINEURAL AS NEEDED
Status: DISCONTINUED | OUTPATIENT
Start: 2024-10-10 | End: 2024-10-11 | Stop reason: HOSPADM

## 2024-10-10 RX ORDER — HEPARIN SODIUM 5000 [USP'U]/ML
7500 INJECTION, SOLUTION INTRAVENOUS; SUBCUTANEOUS EVERY 8 HOURS SCHEDULED
Status: DISCONTINUED | OUTPATIENT
Start: 2024-10-10 | End: 2024-10-15

## 2024-10-10 RX ORDER — DEXTROSE 50 % IN WATER (D50W) INTRAVENOUS SYRINGE
12.5
Status: DISCONTINUED | OUTPATIENT
Start: 2024-10-10 | End: 2024-10-24 | Stop reason: HOSPADM

## 2024-10-10 RX ORDER — POLYETHYLENE GLYCOL 3350 17 G/17G
17 POWDER, FOR SOLUTION ORAL DAILY PRN
Status: DISCONTINUED | OUTPATIENT
Start: 2024-10-10 | End: 2024-10-21

## 2024-10-10 RX ORDER — SPIRONOLACTONE 50 MG/1
100 TABLET, FILM COATED ORAL DAILY
Status: DISCONTINUED | OUTPATIENT
Start: 2024-10-11 | End: 2024-10-11

## 2024-10-10 RX ORDER — FUROSEMIDE 40 MG/1
40 TABLET ORAL 2 TIMES DAILY
Status: DISCONTINUED | OUTPATIENT
Start: 2024-10-10 | End: 2024-10-16

## 2024-10-10 RX ORDER — HEPARIN SODIUM 5000 [USP'U]/ML
4000 INJECTION, SOLUTION INTRAVENOUS; SUBCUTANEOUS ONCE
Status: COMPLETED | OUTPATIENT
Start: 2024-10-10 | End: 2024-10-10

## 2024-10-10 RX ADMIN — FUROSEMIDE 40 MG: 10 INJECTION, SOLUTION INTRAMUSCULAR; INTRAVENOUS at 21:33

## 2024-10-10 RX ADMIN — HEPARIN SODIUM 4000 UNITS: 5000 INJECTION, SOLUTION INTRAVENOUS; SUBCUTANEOUS at 19:49

## 2024-10-10 RX ADMIN — MORPHINE SULFATE 2 MG: 2 INJECTION, SOLUTION INTRAMUSCULAR; INTRAVENOUS at 21:33

## 2024-10-10 RX ADMIN — TICAGRELOR 180 MG: 90 TABLET ORAL at 19:48

## 2024-10-10 RX ADMIN — ASPIRIN 81 MG 324 MG: 81 TABLET ORAL at 19:48

## 2024-10-10 RX ADMIN — LORAZEPAM 1 MG: 2 INJECTION INTRAMUSCULAR; INTRAVENOUS at 21:56

## 2024-10-10 RX ADMIN — IPRATROPIUM BROMIDE AND ALBUTEROL SULFATE 3 ML: .5; 2.5 SOLUTION RESPIRATORY (INHALATION) at 22:08

## 2024-10-10 RX ADMIN — NITROGLYCERIN 1 INCH: 20 OINTMENT TOPICAL at 22:17

## 2024-10-10 SDOH — HEALTH STABILITY: PHYSICAL HEALTH: ON AVERAGE, HOW MANY MINUTES DO YOU ENGAGE IN EXERCISE AT THIS LEVEL?: 0 MIN

## 2024-10-10 SDOH — SOCIAL STABILITY: SOCIAL NETWORK: HOW OFTEN DO YOU GET TOGETHER WITH FRIENDS OR RELATIVES?: MORE THAN THREE TIMES A WEEK

## 2024-10-10 SDOH — SOCIAL STABILITY: SOCIAL INSECURITY: ARE YOU OR HAVE YOU BEEN THREATENED OR ABUSED PHYSICALLY, EMOTIONALLY, OR SEXUALLY BY ANYONE?: NO

## 2024-10-10 SDOH — ECONOMIC STABILITY: HOUSING INSECURITY: AT ANY TIME IN THE PAST 12 MONTHS, WERE YOU HOMELESS OR LIVING IN A SHELTER (INCLUDING NOW)?: NO

## 2024-10-10 SDOH — ECONOMIC STABILITY: INCOME INSECURITY: IN THE PAST 12 MONTHS HAS THE ELECTRIC, GAS, OIL, OR WATER COMPANY THREATENED TO SHUT OFF SERVICES IN YOUR HOME?: YES

## 2024-10-10 SDOH — ECONOMIC STABILITY: FOOD INSECURITY: HOW HARD IS IT FOR YOU TO PAY FOR THE VERY BASICS LIKE FOOD, HOUSING, MEDICAL CARE, AND HEATING?: SOMEWHAT HARD

## 2024-10-10 SDOH — SOCIAL STABILITY: SOCIAL NETWORK
DO YOU BELONG TO ANY CLUBS OR ORGANIZATIONS SUCH AS CHURCH GROUPS UNIONS, FRATERNAL OR ATHLETIC GROUPS, OR SCHOOL GROUPS?: NO

## 2024-10-10 SDOH — SOCIAL STABILITY: SOCIAL NETWORK: ARE YOU MARRIED, WIDOWED, DIVORCED, SEPARATED, NEVER MARRIED, OR LIVING WITH A PARTNER?: WIDOWED

## 2024-10-10 SDOH — ECONOMIC STABILITY: FOOD INSECURITY: WITHIN THE PAST 12 MONTHS, YOU WORRIED THAT YOUR FOOD WOULD RUN OUT BEFORE YOU GOT MONEY TO BUY MORE.: SOMETIMES TRUE

## 2024-10-10 SDOH — SOCIAL STABILITY: SOCIAL NETWORK
DO YOU BELONG TO ANY CLUBS OR ORGANIZATIONS SUCH AS CHURCH GROUPS, UNIONS, FRATERNAL OR ATHLETIC GROUPS, OR SCHOOL GROUPS?: NO

## 2024-10-10 SDOH — HEALTH STABILITY: MENTAL HEALTH: HOW MANY DRINKS CONTAINING ALCOHOL DO YOU HAVE ON A TYPICAL DAY WHEN YOU ARE DRINKING?: PATIENT DOES NOT DRINK

## 2024-10-10 SDOH — SOCIAL STABILITY: SOCIAL NETWORK: HOW OFTEN DO YOU ATTEND MEETINGS OF THE CLUBS OR ORGANIZATIONS YOU BELONG TO?: NEVER

## 2024-10-10 SDOH — ECONOMIC STABILITY: FOOD INSECURITY
WITHIN THE PAST 12 MONTHS, YOU WORRIED THAT YOUR FOOD WOULD RUN OUT BEFORE YOU GOT THE MONEY TO BUY MORE.: SOMETIMES TRUE

## 2024-10-10 SDOH — SOCIAL STABILITY: SOCIAL INSECURITY
WITHIN THE LAST YEAR, HAVE YOU BEEN RAPED OR FORCED TO HAVE ANY KIND OF SEXUAL ACTIVITY BY YOUR PARTNER OR EX-PARTNER?: NO

## 2024-10-10 SDOH — SOCIAL STABILITY: SOCIAL INSECURITY: WERE YOU ABLE TO COMPLETE ALL THE BEHAVIORAL HEALTH SCREENINGS?: YES

## 2024-10-10 SDOH — ECONOMIC STABILITY: INCOME INSECURITY: IN THE PAST 12 MONTHS, HAS THE ELECTRIC, GAS, OIL, OR WATER COMPANY THREATENED TO SHUT OFF SERVICE IN YOUR HOME?: YES

## 2024-10-10 SDOH — ECONOMIC STABILITY: TRANSPORTATION INSECURITY
IN THE PAST 12 MONTHS, HAS THE LACK OF TRANSPORTATION KEPT YOU FROM MEDICAL APPOINTMENTS OR FROM GETTING MEDICATIONS?: NO

## 2024-10-10 SDOH — ECONOMIC STABILITY: INCOME INSECURITY: HOW HARD IS IT FOR YOU TO PAY FOR THE VERY BASICS LIKE FOOD, HOUSING, MEDICAL CARE, AND HEATING?: SOMEWHAT HARD

## 2024-10-10 SDOH — ECONOMIC STABILITY: FOOD INSECURITY: WITHIN THE PAST 12 MONTHS, THE FOOD YOU BOUGHT JUST DIDN'T LAST AND YOU DIDN'T HAVE MONEY TO GET MORE.: SOMETIMES TRUE

## 2024-10-10 SDOH — ECONOMIC STABILITY: TRANSPORTATION INSECURITY: IN THE PAST 12 MONTHS, HAS LACK OF TRANSPORTATION KEPT YOU FROM MEDICAL APPOINTMENTS OR FROM GETTING MEDICATIONS?: NO

## 2024-10-10 SDOH — SOCIAL STABILITY: SOCIAL INSECURITY: WITHIN THE LAST YEAR, HAVE YOU BEEN AFRAID OF YOUR PARTNER OR EX-PARTNER?: NO

## 2024-10-10 SDOH — HEALTH STABILITY: MENTAL HEALTH
DO YOU FEEL STRESS - TENSE, RESTLESS, NERVOUS, OR ANXIOUS, OR UNABLE TO SLEEP AT NIGHT BECAUSE YOUR MIND IS TROUBLED ALL THE TIME - THESE DAYS?: NOT AT ALL

## 2024-10-10 SDOH — SOCIAL STABILITY: SOCIAL INSECURITY
WITHIN THE LAST YEAR, HAVE YOU BEEN KICKED, HIT, SLAPPED, OR OTHERWISE PHYSICALLY HURT BY YOUR PARTNER OR EX-PARTNER?: NO

## 2024-10-10 SDOH — SOCIAL STABILITY: SOCIAL NETWORK: HOW OFTEN DO YOU ATTEND CHURCH OR RELIGIOUS SERVICES?: 1 TO 4 TIMES PER YEAR

## 2024-10-10 SDOH — ECONOMIC STABILITY: INCOME INSECURITY: IN THE LAST 12 MONTHS, WAS THERE A TIME WHEN YOU WERE NOT ABLE TO PAY THE MORTGAGE OR RENT ON TIME?: YES

## 2024-10-10 SDOH — HEALTH STABILITY: PHYSICAL HEALTH
HOW OFTEN DO YOU NEED TO HAVE SOMEONE HELP YOU WHEN YOU READ INSTRUCTIONS, PAMPHLETS, OR OTHER WRITTEN MATERIAL FROM YOUR DOCTOR OR PHARMACY?: NEVER

## 2024-10-10 SDOH — SOCIAL STABILITY: SOCIAL INSECURITY: HAVE YOU HAD ANY THOUGHTS OF HARMING ANYONE ELSE?: NO

## 2024-10-10 SDOH — ECONOMIC STABILITY: HOUSING INSECURITY: IN THE LAST 12 MONTHS, WAS THERE A TIME WHEN YOU WERE NOT ABLE TO PAY THE MORTGAGE OR RENT ON TIME?: YES

## 2024-10-10 SDOH — HEALTH STABILITY: PHYSICAL HEALTH: ON AVERAGE, HOW MANY DAYS PER WEEK DO YOU ENGAGE IN MODERATE TO STRENUOUS EXERCISE (LIKE A BRISK WALK)?: 0 DAYS

## 2024-10-10 SDOH — SOCIAL STABILITY: SOCIAL NETWORK: IN A TYPICAL WEEK, HOW MANY TIMES DO YOU TALK ON THE PHONE WITH FAMILY, FRIENDS, OR NEIGHBORS?: NEVER

## 2024-10-10 SDOH — SOCIAL STABILITY: SOCIAL NETWORK
IN A TYPICAL WEEK, HOW MANY TIMES DO YOU TALK ON THE PHONE WITH FAMILY, FRIENDS, OR NEIGHBORS?: MORE THAN THREE TIMES A WEEK

## 2024-10-10 SDOH — SOCIAL STABILITY: SOCIAL INSECURITY: WITHIN THE LAST YEAR, HAVE YOU BEEN HUMILIATED OR EMOTIONALLY ABUSED IN OTHER WAYS BY YOUR PARTNER OR EX-PARTNER?: NO

## 2024-10-10 SDOH — SOCIAL STABILITY: SOCIAL INSECURITY
WITHIN THE LAST YEAR, HAVE TO BEEN RAPED OR FORCED TO HAVE ANY KIND OF SEXUAL ACTIVITY BY YOUR PARTNER OR EX-PARTNER?: NO

## 2024-10-10 SDOH — SOCIAL STABILITY: SOCIAL INSECURITY: DO YOU FEEL ANYONE HAS EXPLOITED OR TAKEN ADVANTAGE OF YOU FINANCIALLY OR OF YOUR PERSONAL PROPERTY?: NO

## 2024-10-10 SDOH — HEALTH STABILITY: MENTAL HEALTH
STRESS IS WHEN SOMEONE FEELS TENSE, NERVOUS, ANXIOUS, OR CAN'T SLEEP AT NIGHT BECAUSE THEIR MIND IS TROUBLED. HOW STRESSED ARE YOU?: NOT AT ALL

## 2024-10-10 SDOH — HEALTH STABILITY: MENTAL HEALTH
STRESS IS WHEN SOMEONE FEELS TENSE, NERVOUS, ANXIOUS, OR CAN'T SLEEP AT NIGHT BECAUSE THEIR MIND IS TROUBLED. HOW STRESSED ARE YOU?: RATHER MUCH

## 2024-10-10 SDOH — SOCIAL STABILITY: SOCIAL INSECURITY: HAS ANYONE EVER THREATENED TO HURT YOUR FAMILY OR YOUR PETS?: NO

## 2024-10-10 SDOH — HEALTH STABILITY: MENTAL HEALTH
DO YOU FEEL STRESS - TENSE, RESTLESS, NERVOUS, OR ANXIOUS, OR UNABLE TO SLEEP AT NIGHT BECAUSE YOUR MIND IS TROUBLED ALL THE TIME - THESE DAYS?: RATHER MUCH

## 2024-10-10 SDOH — HEALTH STABILITY: MENTAL HEALTH: HOW OFTEN DO YOU HAVE A DRINK CONTAINING ALCOHOL?: NEVER

## 2024-10-10 SDOH — HEALTH STABILITY: MENTAL HEALTH: HOW OFTEN DO YOU HAVE SIX OR MORE DRINKS ON ONE OCCASION?: NEVER

## 2024-10-10 SDOH — SOCIAL STABILITY: SOCIAL NETWORK: HOW OFTEN DO YOU ATTENT MEETINGS OF THE CLUB OR ORGANIZATION YOU BELONG TO?: NEVER

## 2024-10-10 SDOH — SOCIAL STABILITY: SOCIAL INSECURITY: ABUSE: ADULT

## 2024-10-10 SDOH — ECONOMIC STABILITY: TRANSPORTATION INSECURITY
IN THE PAST 12 MONTHS, HAS LACK OF TRANSPORTATION KEPT YOU FROM MEETINGS, WORK, OR FROM GETTING THINGS NEEDED FOR DAILY LIVING?: NO

## 2024-10-10 SDOH — SOCIAL STABILITY: SOCIAL NETWORK: HOW OFTEN DO YOU GET TOGETHER WITH FRIENDS OR RELATIVES?: NEVER

## 2024-10-10 SDOH — HEALTH STABILITY: MENTAL HEALTH: HOW OFTEN DO YOU HAVE 6 OR MORE DRINKS ON ONE OCCASION?: NEVER

## 2024-10-10 SDOH — ECONOMIC STABILITY: HOUSING INSECURITY: IN THE PAST 12 MONTHS, HOW MANY TIMES HAVE YOU MOVED WHERE YOU WERE LIVING?: 0

## 2024-10-10 SDOH — SOCIAL STABILITY: SOCIAL INSECURITY: ARE YOU MARRIED, WIDOWED, DIVORCED, SEPARATED, NEVER MARRIED, OR LIVING WITH A PARTNER?: WIDOWED

## 2024-10-10 SDOH — SOCIAL STABILITY: SOCIAL NETWORK: HOW OFTEN DO YOU ATTEND CHURCH OR RELIGIOUS SERVICES?: NEVER

## 2024-10-10 SDOH — SOCIAL STABILITY: SOCIAL INSECURITY: DOES ANYONE TRY TO KEEP YOU FROM HAVING/CONTACTING OTHER FRIENDS OR DOING THINGS OUTSIDE YOUR HOME?: NO

## 2024-10-10 SDOH — SOCIAL STABILITY: SOCIAL INSECURITY: HAVE YOU HAD THOUGHTS OF HARMING ANYONE ELSE?: YES

## 2024-10-10 SDOH — SOCIAL STABILITY: SOCIAL INSECURITY: ARE THERE ANY APPARENT SIGNS OF INJURIES/BEHAVIORS THAT COULD BE RELATED TO ABUSE/NEGLECT?: NO

## 2024-10-10 SDOH — SOCIAL STABILITY: SOCIAL INSECURITY: DO YOU FEEL UNSAFE GOING BACK TO THE PLACE WHERE YOU ARE LIVING?: NO

## 2024-10-10 SDOH — HEALTH STABILITY: MENTAL HEALTH: HOW MANY STANDARD DRINKS CONTAINING ALCOHOL DO YOU HAVE ON A TYPICAL DAY?: PATIENT DOES NOT DRINK

## 2024-10-10 ASSESSMENT — COGNITIVE AND FUNCTIONAL STATUS - GENERAL
TURNING FROM BACK TO SIDE WHILE IN FLAT BAD: A LOT
MOVING FROM LYING ON BACK TO SITTING ON SIDE OF FLAT BED WITH BEDRAILS: A LOT
WALKING IN HOSPITAL ROOM: A LOT
STANDING UP FROM CHAIR USING ARMS: A LOT
CLIMB 3 TO 5 STEPS WITH RAILING: A LOT
PATIENT BASELINE BEDBOUND: NO
DAILY ACTIVITIY SCORE: 24
MOVING TO AND FROM BED TO CHAIR: A LOT
MOBILITY SCORE: 12

## 2024-10-10 ASSESSMENT — LIFESTYLE VARIABLES
SKIP TO QUESTIONS 9-10: 1
HOW OFTEN DO YOU HAVE 6 OR MORE DRINKS ON ONE OCCASION: NEVER
HAVE YOU EVER FELT YOU SHOULD CUT DOWN ON YOUR DRINKING: NO
SUBSTANCE_ABUSE_PAST_12_MONTHS: NO
PRESCIPTION_ABUSE_PAST_12_MONTHS: NO
AUDIT-C TOTAL SCORE: 0
HOW OFTEN DO YOU HAVE A DRINK CONTAINING ALCOHOL: NEVER
AUDIT-C TOTAL SCORE: 0
HAVE PEOPLE ANNOYED YOU BY CRITICIZING YOUR DRINKING: NO
AUDIT-C TOTAL SCORE: 0
TOTAL SCORE: 0
EVER HAD A DRINK FIRST THING IN THE MORNING TO STEADY YOUR NERVES TO GET RID OF A HANGOVER: NO
HOW MANY STANDARD DRINKS CONTAINING ALCOHOL DO YOU HAVE ON A TYPICAL DAY: PATIENT DOES NOT DRINK
SKIP TO QUESTIONS 9-10: 1
EVER FELT BAD OR GUILTY ABOUT YOUR DRINKING: NO

## 2024-10-10 ASSESSMENT — PATIENT HEALTH QUESTIONNAIRE - PHQ9
2. FEELING DOWN, DEPRESSED OR HOPELESS: NOT AT ALL
1. LITTLE INTEREST OR PLEASURE IN DOING THINGS: NOT AT ALL
SUM OF ALL RESPONSES TO PHQ9 QUESTIONS 1 & 2: 0

## 2024-10-10 ASSESSMENT — ACTIVITIES OF DAILY LIVING (ADL)
TOILETING: INDEPENDENT
HEARING - RIGHT EAR: FUNCTIONAL
LACK_OF_TRANSPORTATION: NO
LACK_OF_TRANSPORTATION: NO
GROOMING: INDEPENDENT
HEARING - LEFT EAR: FUNCTIONAL
PATIENT'S MEMORY ADEQUATE TO SAFELY COMPLETE DAILY ACTIVITIES?: YES
BATHING: INDEPENDENT
DRESSING YOURSELF: INDEPENDENT
FEEDING YOURSELF: INDEPENDENT
WALKS IN HOME: INDEPENDENT
ADEQUATE_TO_COMPLETE_ADL: YES
JUDGMENT_ADEQUATE_SAFELY_COMPLETE_DAILY_ACTIVITIES: YES

## 2024-10-10 ASSESSMENT — COLUMBIA-SUICIDE SEVERITY RATING SCALE - C-SSRS
2. HAVE YOU ACTUALLY HAD ANY THOUGHTS OF KILLING YOURSELF?: NO
6. HAVE YOU EVER DONE ANYTHING, STARTED TO DO ANYTHING, OR PREPARED TO DO ANYTHING TO END YOUR LIFE?: NO
1. IN THE PAST MONTH, HAVE YOU WISHED YOU WERE DEAD OR WISHED YOU COULD GO TO SLEEP AND NOT WAKE UP?: NO

## 2024-10-10 ASSESSMENT — PAIN SCALES - GENERAL
PAINLEVEL_OUTOF10: 0 - NO PAIN
PAINLEVEL_OUTOF10: 0 - NO PAIN

## 2024-10-10 ASSESSMENT — PAIN DESCRIPTION - LOCATION: LOCATION: CHEST

## 2024-10-10 ASSESSMENT — PAIN - FUNCTIONAL ASSESSMENT
PAIN_FUNCTIONAL_ASSESSMENT: 0-10
PAIN_FUNCTIONAL_ASSESSMENT: 0-10

## 2024-10-10 NOTE — TELEPHONE ENCOUNTER
Patient called requesting a new nebulizer be sent to [facility] in [place], the one she currently has, has a crack in it and she had to throw it out.

## 2024-10-10 NOTE — ED PROVIDER NOTES
HPI   Chief Complaint   Patient presents with    Shortness of Breath     Called ems for SOB. Recently got over cellulitis and on antibiotics. Patient states no chest pain just difficulty breathing. Patient given duoneb  and solumedrol.       Patient presents with shortness of breath.  She reports that she has had shortness of breath for the last 1 week.  She does have history of COPD, hyperlipidemia, type 2 diabetes, and continues to smoke.  EMS was called today and she did receive steroids and breathing treatment.  She reports that her symptoms have somewhat improved.              Patient History   No past medical history on file.  No past surgical history on file.  Family History   Problem Relation Name Age of Onset    Diabetes Mother      Diabetes Father       Social History     Tobacco Use    Smoking status: Every Day     Types: Cigarettes    Smokeless tobacco: Never   Substance Use Topics    Alcohol use: Never    Drug use: Never       Physical Exam   ED Triage Vitals [10/10/24 1903]   Temp Heart Rate Respirations BP   -- 97 18 148/58      Pulse Ox Temp src Heart Rate Source Patient Position   (!) 93 % -- -- --      BP Location FiO2 (%)     -- --       Physical Exam  HENT:      Head: Normocephalic.   Cardiovascular:      Rate and Rhythm: Normal rate.   Pulmonary:      Comments: Increased work of breathing  Musculoskeletal:      Comments: Lymphedema bilateral lower extremities   Neurological:      General: No focal deficit present.      Mental Status: She is alert.           ED Course & MDM   ED Course as of 10/10/24 1954   Thu Oct 10, 2024   1906 EKG interpreted by me: Normal sinus rhythm, rate 97.  Rightward axis.  ST elevations noted in lead I and aVL with ST depressions in lead III and aVF. [ML]      ED Course User Index  [ML] Darryl Sahni MD         Diagnoses as of 10/10/24 1954   Shortness of breath   ST elevation                 No data recorded     Marcellus Coma Scale Score: 15 (10/10/24 1905 : Stevie  Jere, RN)                           Medical Decision Making  Patient's presenting EKG was concerning as there are some ST elevations in lead I and aVL, however I feel that the concavity is not totally consistent with usual STEMI.  I initially contacted Dr. You, Cardiology, however PJgavin incorrectly listed him as on-call and he states that actually it is Dr. Kohli who is on call. Dr Kohli was then contacted.  He then made the determination that patient should be treated as STEMI.  Patient was given aspirin, heparin, and Brilinta.  Patient was then emergently taken to cardiac catheterization.  Parts of this chart were completed with dictation software, please excuse any errors in transcription.        Procedure  Critical Care    Performed by: Darryl Sahni MD  Authorized by: Darryl Sahni MD    Critical care provider statement:     Critical care time (minutes):  25    Critical care time was exclusive of:  Separately billable procedures and treating other patients    Critical care was necessary to treat or prevent imminent or life-threatening deterioration of the following conditions:  Cardiac failure    Critical care was time spent personally by me on the following activities:  Development of treatment plan with patient or surrogate, discussions with consultants, evaluation of patient's response to treatment, examination of patient, obtaining history from patient or surrogate, ordering and performing treatments and interventions, ordering and review of laboratory studies, ordering and review of radiographic studies, pulse oximetry, re-evaluation of patient's condition and review of old charts    Care discussed with: admitting provider         Darryl Sahni MD  10/10/24 2034

## 2024-10-11 ENCOUNTER — APPOINTMENT (OUTPATIENT)
Dept: CARDIOLOGY | Facility: HOSPITAL | Age: 59
DRG: 280 | End: 2024-10-11
Payer: COMMERCIAL

## 2024-10-11 ENCOUNTER — APPOINTMENT (OUTPATIENT)
Dept: PRIMARY CARE | Facility: CLINIC | Age: 59
End: 2024-10-11
Payer: COMMERCIAL

## 2024-10-11 ENCOUNTER — APPOINTMENT (OUTPATIENT)
Dept: RADIOLOGY | Facility: HOSPITAL | Age: 59
DRG: 280 | End: 2024-10-11
Payer: COMMERCIAL

## 2024-10-11 LAB
ALBUMIN SERPL BCP-MCNC: 3 G/DL (ref 3.4–5)
ALBUMIN SERPL BCP-MCNC: 3.2 G/DL (ref 3.4–5)
ALP SERPL-CCNC: 105 U/L (ref 33–110)
ALP SERPL-CCNC: 96 U/L (ref 33–110)
ALT SERPL W P-5'-P-CCNC: 39 U/L (ref 7–45)
ALT SERPL W P-5'-P-CCNC: 45 U/L (ref 7–45)
ANION GAP BLDA CALCULATED.4IONS-SCNC: 2 MMO/L (ref 10–25)
ANION GAP BLDA CALCULATED.4IONS-SCNC: 4 MMO/L (ref 10–25)
ANION GAP BLDA CALCULATED.4IONS-SCNC: 5 MMO/L (ref 10–25)
ANION GAP SERPL CALCULATED.3IONS-SCNC: 8 MMOL/L (ref 10–20)
ANION GAP SERPL CALCULATED.3IONS-SCNC: 8 MMOL/L (ref 10–20)
APPARATUS: ABNORMAL
ARTERIAL PATENCY WRIST A: POSITIVE
AST SERPL W P-5'-P-CCNC: 102 U/L (ref 9–39)
AST SERPL W P-5'-P-CCNC: 79 U/L (ref 9–39)
ATRIAL RATE: 88 BPM
BASE EXCESS BLDA CALC-SCNC: 3.9 MMOL/L (ref -2–3)
BASE EXCESS BLDA CALC-SCNC: 5.2 MMOL/L (ref -2–3)
BASE EXCESS BLDA CALC-SCNC: 6.6 MMOL/L (ref -2–3)
BASOPHILS # BLD AUTO: 0.03 X10*3/UL (ref 0–0.1)
BASOPHILS # BLD AUTO: 0.09 X10*3/UL (ref 0–0.1)
BASOPHILS NFR BLD AUTO: 0.2 %
BASOPHILS NFR BLD AUTO: 0.4 %
BILIRUB SERPL-MCNC: 0.7 MG/DL (ref 0–1.2)
BILIRUB SERPL-MCNC: 0.7 MG/DL (ref 0–1.2)
BODY TEMPERATURE: 37 DEGREES CELSIUS
BUN SERPL-MCNC: 19 MG/DL (ref 6–23)
BUN SERPL-MCNC: 20 MG/DL (ref 6–23)
CA-I BLDA-SCNC: 1.12 MMOL/L (ref 1.1–1.33)
CA-I BLDA-SCNC: 1.17 MMOL/L (ref 1.1–1.33)
CA-I BLDA-SCNC: 1.17 MMOL/L (ref 1.1–1.33)
CALCIUM SERPL-MCNC: 8 MG/DL (ref 8.6–10.3)
CALCIUM SERPL-MCNC: 8.3 MG/DL (ref 8.6–10.3)
CARDIAC TROPONIN I PNL SERPL HS: ABNORMAL NG/L (ref 0–13)
CHLORIDE BLDA-SCNC: 98 MMOL/L (ref 98–107)
CHLORIDE SERPL-SCNC: 100 MMOL/L (ref 98–107)
CHLORIDE SERPL-SCNC: 99 MMOL/L (ref 98–107)
CO2 SERPL-SCNC: 31 MMOL/L (ref 21–32)
CO2 SERPL-SCNC: 33 MMOL/L (ref 21–32)
CREAT SERPL-MCNC: 0.61 MG/DL (ref 0.5–1.05)
CREAT SERPL-MCNC: 0.65 MG/DL (ref 0.5–1.05)
EGFRCR SERPLBLD CKD-EPI 2021: >90 ML/MIN/1.73M*2
EGFRCR SERPLBLD CKD-EPI 2021: >90 ML/MIN/1.73M*2
EJECTION FRACTION APICAL 4 CHAMBER: 45.8
EJECTION FRACTION: 43 %
EOSINOPHIL # BLD AUTO: 0 X10*3/UL (ref 0–0.7)
EOSINOPHIL # BLD AUTO: 0.02 X10*3/UL (ref 0–0.7)
EOSINOPHIL NFR BLD AUTO: 0 %
EOSINOPHIL NFR BLD AUTO: 0.1 %
EPAP CMH2O: 10 CM H2O
EPAP CMH2O: 8 CM H2O
EPAP CMH2O: 8 CM H2O
ERYTHROCYTE [DISTWIDTH] IN BLOOD BY AUTOMATED COUNT: 18.5 % (ref 11.5–14.5)
ERYTHROCYTE [DISTWIDTH] IN BLOOD BY AUTOMATED COUNT: 18.6 % (ref 11.5–14.5)
FREQUENCY (BPM): 18 BPM
GLUCOSE BLD MANUAL STRIP-MCNC: 129 MG/DL (ref 74–99)
GLUCOSE BLD MANUAL STRIP-MCNC: 129 MG/DL (ref 74–99)
GLUCOSE BLD MANUAL STRIP-MCNC: 139 MG/DL (ref 74–99)
GLUCOSE BLD MANUAL STRIP-MCNC: 143 MG/DL (ref 74–99)
GLUCOSE BLD MANUAL STRIP-MCNC: 145 MG/DL (ref 74–99)
GLUCOSE BLD MANUAL STRIP-MCNC: 147 MG/DL (ref 74–99)
GLUCOSE BLD MANUAL STRIP-MCNC: 160 MG/DL (ref 74–99)
GLUCOSE BLD MANUAL STRIP-MCNC: 169 MG/DL (ref 74–99)
GLUCOSE BLDA-MCNC: 118 MG/DL (ref 74–99)
GLUCOSE BLDA-MCNC: 121 MG/DL (ref 74–99)
GLUCOSE BLDA-MCNC: 164 MG/DL (ref 74–99)
GLUCOSE SERPL-MCNC: 121 MG/DL (ref 74–99)
GLUCOSE SERPL-MCNC: 130 MG/DL (ref 74–99)
HCO3 BLDA-SCNC: 31.9 MMOL/L (ref 22–26)
HCO3 BLDA-SCNC: 33.3 MMOL/L (ref 22–26)
HCO3 BLDA-SCNC: 33.5 MMOL/L (ref 22–26)
HCT VFR BLD AUTO: 40.2 % (ref 36–46)
HCT VFR BLD AUTO: 43.7 % (ref 36–46)
HCT VFR BLD EST: 42 % (ref 36–46)
HCT VFR BLD EST: 43 % (ref 36–46)
HCT VFR BLD EST: 43 % (ref 36–46)
HGB BLD-MCNC: 13.2 G/DL (ref 12–16)
HGB BLD-MCNC: 14 G/DL (ref 12–16)
HGB BLDA-MCNC: 14 G/DL (ref 12–16)
HGB BLDA-MCNC: 14.3 G/DL (ref 12–16)
HGB BLDA-MCNC: 14.4 G/DL (ref 12–16)
IMM GRANULOCYTES # BLD AUTO: 0.56 X10*3/UL (ref 0–0.7)
IMM GRANULOCYTES # BLD AUTO: 0.8 X10*3/UL (ref 0–0.7)
IMM GRANULOCYTES NFR BLD AUTO: 2.9 % (ref 0–0.9)
IMM GRANULOCYTES NFR BLD AUTO: 3.5 % (ref 0–0.9)
INHALED O2 CONCENTRATION: 40 %
INHALED O2 CONCENTRATION: 40 %
INHALED O2 CONCENTRATION: 65 %
IPAP CMH2O: 14 CM H2O
IPAP CMH2O: 15 CM H2O
IPAP CMH2O: 15 CM H2O
LACTATE BLDA-SCNC: 0.7 MMOL/L (ref 0.4–2)
LACTATE BLDA-SCNC: 0.8 MMOL/L (ref 0.4–2)
LACTATE BLDA-SCNC: 1 MMOL/L (ref 0.4–2)
LEFT ATRIUM VOLUME AREA LENGTH INDEX BSA: 45.2 ML/M2
LEFT VENTRICLE INTERNAL DIMENSION DIASTOLE: 6.36 CM (ref 3.5–6)
LEFT VENTRICULAR OUTFLOW TRACT DIAMETER: 2.3 CM
LV EJECTION FRACTION BIPLANE: 45 %
LYMPHOCYTES # BLD AUTO: 0.79 X10*3/UL (ref 1.2–4.8)
LYMPHOCYTES # BLD AUTO: 1.11 X10*3/UL (ref 1.2–4.8)
LYMPHOCYTES NFR BLD AUTO: 4.2 %
LYMPHOCYTES NFR BLD AUTO: 4.9 %
MAGNESIUM SERPL-MCNC: 1.88 MG/DL (ref 1.6–2.4)
MAGNESIUM SERPL-MCNC: 2.3 MG/DL (ref 1.6–2.4)
MCH RBC QN AUTO: 26.6 PG (ref 26–34)
MCH RBC QN AUTO: 27.1 PG (ref 26–34)
MCHC RBC AUTO-ENTMCNC: 32 G/DL (ref 32–36)
MCHC RBC AUTO-ENTMCNC: 32.8 G/DL (ref 32–36)
MCV RBC AUTO: 83 FL (ref 80–100)
MCV RBC AUTO: 83 FL (ref 80–100)
MITRAL VALVE E/A RATIO: 1.63
MONOCYTES # BLD AUTO: 0.45 X10*3/UL (ref 0.1–1)
MONOCYTES # BLD AUTO: 1.31 X10*3/UL (ref 0.1–1)
MONOCYTES NFR BLD AUTO: 2.4 %
MONOCYTES NFR BLD AUTO: 5.8 %
MRSA DNA SPEC QL NAA+PROBE: NOT DETECTED
NEUTROPHILS # BLD AUTO: 17.16 X10*3/UL (ref 1.2–7.7)
NEUTROPHILS # BLD AUTO: 19.3 X10*3/UL (ref 1.2–7.7)
NEUTROPHILS NFR BLD AUTO: 85.3 %
NEUTROPHILS NFR BLD AUTO: 90.3 %
NRBC BLD-RTO: 0 /100 WBCS (ref 0–0)
NRBC BLD-RTO: 0 /100 WBCS (ref 0–0)
OXYHGB MFR BLDA: 92.4 % (ref 94–98)
OXYHGB MFR BLDA: 92.8 % (ref 94–98)
OXYHGB MFR BLDA: 93.9 % (ref 94–98)
P AXIS: 56 DEGREES
P OFFSET: 197 MS
P ONSET: 136 MS
PCO2 BLDA: 55 MM HG (ref 38–42)
PCO2 BLDA: 62 MM HG (ref 38–42)
PCO2 BLDA: 65 MM HG (ref 38–42)
PH BLDA: 7.32 PH (ref 7.38–7.42)
PH BLDA: 7.32 PH (ref 7.38–7.42)
PH BLDA: 7.39 PH (ref 7.38–7.42)
PHOSPHATE SERPL-MCNC: 4.5 MG/DL (ref 2.5–4.9)
PHOSPHATE SERPL-MCNC: 4.8 MG/DL (ref 2.5–4.9)
PLATELET # BLD AUTO: 145 X10*3/UL (ref 150–450)
PLATELET # BLD AUTO: 150 X10*3/UL (ref 150–450)
PO2 BLDA: 101 MM HG (ref 85–95)
PO2 BLDA: 180 MM HG (ref 85–95)
PO2 BLDA: 94 MM HG (ref 85–95)
POTASSIUM BLDA-SCNC: 4.5 MMOL/L (ref 3.5–5.3)
POTASSIUM BLDA-SCNC: 4.6 MMOL/L (ref 3.5–5.3)
POTASSIUM BLDA-SCNC: 4.7 MMOL/L (ref 3.5–5.3)
POTASSIUM SERPL-SCNC: 4.3 MMOL/L (ref 3.5–5.3)
POTASSIUM SERPL-SCNC: 4.7 MMOL/L (ref 3.5–5.3)
PR INTERVAL: 160 MS
PROCALCITONIN SERPL-MCNC: 0.55 NG/ML
PROT SERPL-MCNC: 5.7 G/DL (ref 6.4–8.2)
PROT SERPL-MCNC: 6.2 G/DL (ref 6.4–8.2)
Q ONSET: 216 MS
QRS COUNT: 15 BEATS
QRS DURATION: 124 MS
QT INTERVAL: 390 MS
QTC CALCULATION(BAZETT): 471 MS
QTC FREDERICIA: 443 MS
R AXIS: -40 DEGREES
RBC # BLD AUTO: 4.87 X10*6/UL (ref 4–5.2)
RBC # BLD AUTO: 5.26 X10*6/UL (ref 4–5.2)
RIGHT VENTRICLE FREE WALL PEAK S': 14.9 CM/S
SAO2 % BLDA: 98 % (ref 94–100)
SAO2 % BLDA: 98 % (ref 94–100)
SAO2 % BLDA: 99 % (ref 94–100)
SODIUM BLDA-SCNC: 129 MMOL/L (ref 136–145)
SODIUM BLDA-SCNC: 130 MMOL/L (ref 136–145)
SODIUM BLDA-SCNC: 131 MMOL/L (ref 136–145)
SODIUM SERPL-SCNC: 135 MMOL/L (ref 136–145)
SODIUM SERPL-SCNC: 135 MMOL/L (ref 136–145)
SPECIMEN DRAWN FROM PATIENT: ABNORMAL
T AXIS: 87 DEGREES
T OFFSET: 411 MS
VENTILATOR MODE: ABNORMAL
VENTILATOR MODE: ABNORMAL
VENTILATOR RATE: 18 BPM
VENTILATOR RATE: 18 BPM
VENTRICULAR RATE: 88 BPM
WBC # BLD AUTO: 19 X10*3/UL (ref 4.4–11.3)
WBC # BLD AUTO: 22.6 X10*3/UL (ref 4.4–11.3)

## 2024-10-11 PROCEDURE — 83735 ASSAY OF MAGNESIUM: CPT

## 2024-10-11 PROCEDURE — 2500000002 HC RX 250 W HCPCS SELF ADMINISTERED DRUGS (ALT 637 FOR MEDICARE OP, ALT 636 FOR OP/ED): Performed by: INTERNAL MEDICINE

## 2024-10-11 PROCEDURE — 36600 WITHDRAWAL OF ARTERIAL BLOOD: CPT

## 2024-10-11 PROCEDURE — 82947 ASSAY GLUCOSE BLOOD QUANT: CPT

## 2024-10-11 PROCEDURE — 84100 ASSAY OF PHOSPHORUS: CPT

## 2024-10-11 PROCEDURE — 2500000001 HC RX 250 WO HCPCS SELF ADMINISTERED DRUGS (ALT 637 FOR MEDICARE OP)

## 2024-10-11 PROCEDURE — 93306 TTE W/DOPPLER COMPLETE: CPT

## 2024-10-11 PROCEDURE — 2500000004 HC RX 250 GENERAL PHARMACY W/ HCPCS (ALT 636 FOR OP/ED): Mod: JZ | Performed by: INTERNAL MEDICINE

## 2024-10-11 PROCEDURE — 9420000001 HC RT PATIENT EDUCATION 5 MIN

## 2024-10-11 PROCEDURE — 84132 ASSAY OF SERUM POTASSIUM: CPT

## 2024-10-11 PROCEDURE — 82810 BLOOD GASES O2 SAT ONLY: CPT | Performed by: INTERNAL MEDICINE

## 2024-10-11 PROCEDURE — 36415 COLL VENOUS BLD VENIPUNCTURE: CPT | Performed by: INTERNAL MEDICINE

## 2024-10-11 PROCEDURE — 80053 COMPREHEN METABOLIC PANEL: CPT

## 2024-10-11 PROCEDURE — 2500000004 HC RX 250 GENERAL PHARMACY W/ HCPCS (ALT 636 FOR OP/ED): Performed by: INTERNAL MEDICINE

## 2024-10-11 PROCEDURE — 94660 CPAP INITIATION&MGMT: CPT

## 2024-10-11 PROCEDURE — 84145 PROCALCITONIN (PCT): CPT | Mod: WESLAB

## 2024-10-11 PROCEDURE — 93005 ELECTROCARDIOGRAM TRACING: CPT

## 2024-10-11 PROCEDURE — 84484 ASSAY OF TROPONIN QUANT: CPT | Performed by: INTERNAL MEDICINE

## 2024-10-11 PROCEDURE — 82947 ASSAY GLUCOSE BLOOD QUANT: CPT | Performed by: INTERNAL MEDICINE

## 2024-10-11 PROCEDURE — 83605 ASSAY OF LACTIC ACID: CPT

## 2024-10-11 PROCEDURE — 85025 COMPLETE CBC W/AUTO DIFF WBC: CPT | Performed by: INTERNAL MEDICINE

## 2024-10-11 PROCEDURE — 83735 ASSAY OF MAGNESIUM: CPT | Performed by: INTERNAL MEDICINE

## 2024-10-11 PROCEDURE — 2020000001 HC ICU ROOM DAILY

## 2024-10-11 PROCEDURE — 2500000001 HC RX 250 WO HCPCS SELF ADMINISTERED DRUGS (ALT 637 FOR MEDICARE OP): Performed by: INTERNAL MEDICINE

## 2024-10-11 PROCEDURE — 85025 COMPLETE CBC W/AUTO DIFF WBC: CPT

## 2024-10-11 PROCEDURE — 84100 ASSAY OF PHOSPHORUS: CPT | Performed by: INTERNAL MEDICINE

## 2024-10-11 PROCEDURE — 84295 ASSAY OF SERUM SODIUM: CPT | Performed by: INTERNAL MEDICINE

## 2024-10-11 PROCEDURE — 2500000004 HC RX 250 GENERAL PHARMACY W/ HCPCS (ALT 636 FOR OP/ED)

## 2024-10-11 PROCEDURE — 99291 CRITICAL CARE FIRST HOUR: CPT | Performed by: INTERNAL MEDICINE

## 2024-10-11 PROCEDURE — 87640 STAPH A DNA AMP PROBE: CPT | Performed by: INTERNAL MEDICINE

## 2024-10-11 PROCEDURE — 99233 SBSQ HOSP IP/OBS HIGH 50: CPT | Performed by: INTERNAL MEDICINE

## 2024-10-11 PROCEDURE — 36415 COLL VENOUS BLD VENIPUNCTURE: CPT

## 2024-10-11 PROCEDURE — 71045 X-RAY EXAM CHEST 1 VIEW: CPT | Performed by: RADIOLOGY

## 2024-10-11 PROCEDURE — 94640 AIRWAY INHALATION TREATMENT: CPT

## 2024-10-11 PROCEDURE — 71045 X-RAY EXAM CHEST 1 VIEW: CPT

## 2024-10-11 PROCEDURE — 93010 ELECTROCARDIOGRAM REPORT: CPT | Performed by: INTERNAL MEDICINE

## 2024-10-11 PROCEDURE — 93306 TTE W/DOPPLER COMPLETE: CPT | Performed by: INTERNAL MEDICINE

## 2024-10-11 RX ORDER — METOPROLOL TARTRATE 25 MG/1
25 TABLET, FILM COATED ORAL 2 TIMES DAILY
Status: DISCONTINUED | OUTPATIENT
Start: 2024-10-11 | End: 2024-10-11

## 2024-10-11 RX ORDER — IPRATROPIUM BROMIDE AND ALBUTEROL SULFATE 2.5; .5 MG/3ML; MG/3ML
3 SOLUTION RESPIRATORY (INHALATION)
Status: DISCONTINUED | OUTPATIENT
Start: 2024-10-11 | End: 2024-10-20

## 2024-10-11 RX ORDER — DEXMEDETOMIDINE HYDROCHLORIDE 4 UG/ML
.1-1.5 INJECTION, SOLUTION INTRAVENOUS CONTINUOUS
Status: DISCONTINUED | OUTPATIENT
Start: 2024-10-11 | End: 2024-10-14

## 2024-10-11 RX ORDER — IODIXANOL 320 MG/ML
INJECTION, SOLUTION INTRAVASCULAR AS NEEDED
Status: DISCONTINUED | OUTPATIENT
Start: 2024-10-10 | End: 2024-10-11 | Stop reason: HOSPADM

## 2024-10-11 RX ORDER — VANCOMYCIN 2 G/400ML
2 INJECTION, SOLUTION INTRAVENOUS ONCE
Status: COMPLETED | OUTPATIENT
Start: 2024-10-11 | End: 2024-10-11

## 2024-10-11 RX ORDER — HEPARIN SODIUM 1000 [USP'U]/ML
INJECTION, SOLUTION INTRAVENOUS; SUBCUTANEOUS AS NEEDED
Status: DISCONTINUED | OUTPATIENT
Start: 2024-10-10 | End: 2024-10-11 | Stop reason: HOSPADM

## 2024-10-11 RX ORDER — VANCOMYCIN HYDROCHLORIDE 1 G/20ML
INJECTION, POWDER, LYOPHILIZED, FOR SOLUTION INTRAVENOUS DAILY PRN
Status: DISCONTINUED | OUTPATIENT
Start: 2024-10-11 | End: 2024-10-14

## 2024-10-11 RX ORDER — MAGNESIUM SULFATE HEPTAHYDRATE 40 MG/ML
2 INJECTION, SOLUTION INTRAVENOUS ONCE
Status: COMPLETED | OUTPATIENT
Start: 2024-10-11 | End: 2024-10-11

## 2024-10-11 RX ORDER — LOSARTAN POTASSIUM 50 MG/1
50 TABLET ORAL 2 TIMES DAILY
Status: DISCONTINUED | OUTPATIENT
Start: 2024-10-11 | End: 2024-10-12

## 2024-10-11 RX ORDER — METOPROLOL TARTRATE 1 MG/ML
5 INJECTION, SOLUTION INTRAVENOUS EVERY 6 HOURS
Status: DISCONTINUED | OUTPATIENT
Start: 2024-10-11 | End: 2024-10-12

## 2024-10-11 RX ORDER — VANCOMYCIN 1.25 G/250ML
1750 INJECTION, SOLUTION INTRAVENOUS EVERY 12 HOURS
Status: DISCONTINUED | OUTPATIENT
Start: 2024-10-12 | End: 2024-10-12

## 2024-10-11 RX ORDER — FUROSEMIDE 10 MG/ML
40 INJECTION INTRAMUSCULAR; INTRAVENOUS EVERY 8 HOURS
Status: DISCONTINUED | OUTPATIENT
Start: 2024-10-11 | End: 2024-10-13

## 2024-10-11 RX ORDER — FUROSEMIDE 10 MG/ML
40 INJECTION INTRAMUSCULAR; INTRAVENOUS EVERY 8 HOURS
Status: DISCONTINUED | OUTPATIENT
Start: 2024-10-11 | End: 2024-10-11

## 2024-10-11 RX ADMIN — ESCITALOPRAM OXALATE 10 MG: 10 TABLET ORAL at 11:25

## 2024-10-11 RX ADMIN — MAGNESIUM SULFATE HEPTAHYDRATE 2 G: 40 INJECTION, SOLUTION INTRAVENOUS at 14:25

## 2024-10-11 RX ADMIN — HEPARIN SODIUM 7500 UNITS: 5000 INJECTION, SOLUTION INTRAVENOUS; SUBCUTANEOUS at 00:49

## 2024-10-11 RX ADMIN — FUROSEMIDE 40 MG: 10 INJECTION, SOLUTION INTRAMUSCULAR; INTRAVENOUS at 11:07

## 2024-10-11 RX ADMIN — PIPERACILLIN SODIUM AND TAZOBACTAM SODIUM 3.38 G: 3; .375 INJECTION, SOLUTION INTRAVENOUS at 21:53

## 2024-10-11 RX ADMIN — PIPERACILLIN SODIUM AND TAZOBACTAM SODIUM 3.38 G: 3; .375 INJECTION, SOLUTION INTRAVENOUS at 14:58

## 2024-10-11 RX ADMIN — ASPIRIN 81 MG: 81 TABLET, COATED ORAL at 11:25

## 2024-10-11 RX ADMIN — PERFLUTREN 3 ML OF DILUTION: 6.52 INJECTION, SUSPENSION INTRAVENOUS at 08:41

## 2024-10-11 RX ADMIN — VANCOMYCIN 2 G: 2 INJECTION, SOLUTION INTRAVENOUS at 15:38

## 2024-10-11 RX ADMIN — IPRATROPIUM BROMIDE AND ALBUTEROL SULFATE 3 ML: 2.5; .5 SOLUTION RESPIRATORY (INHALATION) at 19:04

## 2024-10-11 RX ADMIN — METOPROLOL TARTRATE 5 MG: 5 INJECTION INTRAVENOUS at 16:20

## 2024-10-11 RX ADMIN — TICAGRELOR 90 MG: 90 TABLET ORAL at 11:26

## 2024-10-11 RX ADMIN — METHYLPREDNISOLONE SODIUM SUCCINATE 40 MG: 40 INJECTION, POWDER, FOR SOLUTION INTRAMUSCULAR; INTRAVENOUS at 18:38

## 2024-10-11 RX ADMIN — METHYLPREDNISOLONE SODIUM SUCCINATE 40 MG: 40 INJECTION, POWDER, FOR SOLUTION INTRAMUSCULAR; INTRAVENOUS at 11:55

## 2024-10-11 RX ADMIN — LOSARTAN POTASSIUM 50 MG: 50 TABLET, FILM COATED ORAL at 11:25

## 2024-10-11 RX ADMIN — HEPARIN SODIUM 7500 UNITS: 5000 INJECTION, SOLUTION INTRAVENOUS; SUBCUTANEOUS at 21:52

## 2024-10-11 RX ADMIN — IPRATROPIUM BROMIDE AND ALBUTEROL SULFATE 3 ML: 2.5; .5 SOLUTION RESPIRATORY (INHALATION) at 11:53

## 2024-10-11 RX ADMIN — IPRATROPIUM BROMIDE AND ALBUTEROL SULFATE 3 ML: 2.5; .5 SOLUTION RESPIRATORY (INHALATION) at 15:35

## 2024-10-11 RX ADMIN — DEXMEDETOMIDINE HYDROCHLORIDE 0.2 MCG/KG/HR: 4 INJECTION, SOLUTION INTRAVENOUS at 19:01

## 2024-10-11 RX ADMIN — FUROSEMIDE 40 MG: 10 INJECTION, SOLUTION INTRAMUSCULAR; INTRAVENOUS at 18:40

## 2024-10-11 RX ADMIN — HEPARIN SODIUM 7500 UNITS: 5000 INJECTION, SOLUTION INTRAVENOUS; SUBCUTANEOUS at 06:06

## 2024-10-11 RX ADMIN — HEPARIN SODIUM 7500 UNITS: 5000 INJECTION, SOLUTION INTRAVENOUS; SUBCUTANEOUS at 14:08

## 2024-10-11 SDOH — ECONOMIC STABILITY: TRANSPORTATION INSECURITY: IN THE PAST 12 MONTHS, HAS LACK OF TRANSPORTATION KEPT YOU FROM MEDICAL APPOINTMENTS OR FROM GETTING MEDICATIONS?: NO

## 2024-10-11 SDOH — ECONOMIC STABILITY: FOOD INSECURITY: WITHIN THE PAST 12 MONTHS, THE FOOD YOU BOUGHT JUST DIDN'T LAST AND YOU DIDN'T HAVE MONEY TO GET MORE.: SOMETIMES TRUE

## 2024-10-11 SDOH — SOCIAL STABILITY: SOCIAL NETWORK: HOW OFTEN DO YOU ATTEND MEETINGS OF THE CLUBS OR ORGANIZATIONS YOU BELONG TO?: NEVER

## 2024-10-11 SDOH — ECONOMIC STABILITY: HOUSING INSECURITY: AT ANY TIME IN THE PAST 12 MONTHS, WERE YOU HOMELESS OR LIVING IN A SHELTER (INCLUDING NOW)?: NO

## 2024-10-11 SDOH — SOCIAL STABILITY: SOCIAL NETWORK: HOW OFTEN DO YOU GET TOGETHER WITH FRIENDS OR RELATIVES?: ONCE A WEEK

## 2024-10-11 SDOH — HEALTH STABILITY: PHYSICAL HEALTH: ON AVERAGE, HOW MANY DAYS PER WEEK DO YOU ENGAGE IN MODERATE TO STRENUOUS EXERCISE (LIKE A BRISK WALK)?: 0 DAYS

## 2024-10-11 SDOH — SOCIAL STABILITY: SOCIAL INSECURITY: ARE YOU MARRIED, WIDOWED, DIVORCED, SEPARATED, NEVER MARRIED, OR LIVING WITH A PARTNER?: WIDOWED

## 2024-10-11 SDOH — ECONOMIC STABILITY: INCOME INSECURITY: HOW HARD IS IT FOR YOU TO PAY FOR THE VERY BASICS LIKE FOOD, HOUSING, MEDICAL CARE, AND HEATING?: SOMEWHAT HARD

## 2024-10-11 SDOH — ECONOMIC STABILITY: HOUSING INSECURITY: IN THE LAST 12 MONTHS, WAS THERE A TIME WHEN YOU WERE NOT ABLE TO PAY THE MORTGAGE OR RENT ON TIME?: YES

## 2024-10-11 SDOH — HEALTH STABILITY: MENTAL HEALTH: HOW OFTEN DO YOU HAVE A DRINK CONTAINING ALCOHOL?: NEVER

## 2024-10-11 SDOH — ECONOMIC STABILITY: INCOME INSECURITY: IN THE PAST 12 MONTHS, HAS THE ELECTRIC, GAS, OIL, OR WATER COMPANY THREATENED TO SHUT OFF SERVICE IN YOUR HOME?: YES

## 2024-10-11 SDOH — SOCIAL STABILITY: SOCIAL INSECURITY: WITHIN THE LAST YEAR, HAVE YOU BEEN AFRAID OF YOUR PARTNER OR EX-PARTNER?: NO

## 2024-10-11 SDOH — SOCIAL STABILITY: SOCIAL INSECURITY: WITHIN THE LAST YEAR, HAVE YOU BEEN HUMILIATED OR EMOTIONALLY ABUSED IN OTHER WAYS BY YOUR PARTNER OR EX-PARTNER?: NO

## 2024-10-11 SDOH — ECONOMIC STABILITY: INCOME INSECURITY: IN THE PAST 12 MONTHS HAS THE ELECTRIC, GAS, OIL, OR WATER COMPANY THREATENED TO SHUT OFF SERVICES IN YOUR HOME?: YES

## 2024-10-11 SDOH — HEALTH STABILITY: MENTAL HEALTH: HOW OFTEN DO YOU HAVE 6 OR MORE DRINKS ON ONE OCCASION?: NEVER

## 2024-10-11 SDOH — ECONOMIC STABILITY: HOUSING INSECURITY: IN THE PAST 12 MONTHS, HOW MANY TIMES HAVE YOU MOVED WHERE YOU WERE LIVING?: 0

## 2024-10-11 SDOH — HEALTH STABILITY: MENTAL HEALTH: HOW MANY STANDARD DRINKS CONTAINING ALCOHOL DO YOU HAVE ON A TYPICAL DAY?: PATIENT DOES NOT DRINK

## 2024-10-11 SDOH — SOCIAL STABILITY: SOCIAL NETWORK: HOW OFTEN DO YOU ATTENT MEETINGS OF THE CLUB OR ORGANIZATION YOU BELONG TO?: NEVER

## 2024-10-11 SDOH — SOCIAL STABILITY: SOCIAL NETWORK: IN A TYPICAL WEEK, HOW MANY TIMES DO YOU TALK ON THE PHONE WITH FAMILY, FRIENDS, OR NEIGHBORS?: TWICE A WEEK

## 2024-10-11 SDOH — HEALTH STABILITY: PHYSICAL HEALTH: ON AVERAGE, HOW MANY MINUTES DO YOU ENGAGE IN EXERCISE AT THIS LEVEL?: 0 MIN

## 2024-10-11 SDOH — SOCIAL STABILITY: SOCIAL NETWORK: HOW OFTEN DO YOU ATTEND CHURCH OR RELIGIOUS SERVICES?: NEVER

## 2024-10-11 SDOH — HEALTH STABILITY: MENTAL HEALTH: HOW OFTEN DO YOU HAVE SIX OR MORE DRINKS ON ONE OCCASION?: NEVER

## 2024-10-11 SDOH — SOCIAL STABILITY: SOCIAL NETWORK: ARE YOU MARRIED, WIDOWED, DIVORCED, SEPARATED, NEVER MARRIED, OR LIVING WITH A PARTNER?: WIDOWED

## 2024-10-11 SDOH — ECONOMIC STABILITY: INCOME INSECURITY: IN THE LAST 12 MONTHS, WAS THERE A TIME WHEN YOU WERE NOT ABLE TO PAY THE MORTGAGE OR RENT ON TIME?: YES

## 2024-10-11 SDOH — ECONOMIC STABILITY: FOOD INSECURITY: WITHIN THE PAST 12 MONTHS, YOU WORRIED THAT YOUR FOOD WOULD RUN OUT BEFORE YOU GOT MONEY TO BUY MORE.: SOMETIMES TRUE

## 2024-10-11 SDOH — ECONOMIC STABILITY: FOOD INSECURITY: HOW HARD IS IT FOR YOU TO PAY FOR THE VERY BASICS LIKE FOOD, HOUSING, MEDICAL CARE, AND HEATING?: SOMEWHAT HARD

## 2024-10-11 SDOH — HEALTH STABILITY: MENTAL HEALTH: HOW MANY DRINKS CONTAINING ALCOHOL DO YOU HAVE ON A TYPICAL DAY WHEN YOU ARE DRINKING?: PATIENT DOES NOT DRINK

## 2024-10-11 ASSESSMENT — PAIN - FUNCTIONAL ASSESSMENT
PAIN_FUNCTIONAL_ASSESSMENT: 0-10
PAIN_FUNCTIONAL_ASSESSMENT: 0-10
PAIN_FUNCTIONAL_ASSESSMENT: CPOT (CRITICAL CARE PAIN OBSERVATION TOOL)
PAIN_FUNCTIONAL_ASSESSMENT: 0-10
PAIN_FUNCTIONAL_ASSESSMENT: CPOT (CRITICAL CARE PAIN OBSERVATION TOOL)

## 2024-10-11 ASSESSMENT — PAIN SCALES - GENERAL
PAINLEVEL_OUTOF10: 0 - NO PAIN
PAINLEVEL_OUTOF10: 0 - NO PAIN
PAINLEVEL_OUTOF10: 3
PAINLEVEL_OUTOF10: 0 - NO PAIN
PAINLEVEL_OUTOF10: 0 - NO PAIN

## 2024-10-11 ASSESSMENT — LIFESTYLE VARIABLES
AUDIT-C TOTAL SCORE: 0
SKIP TO QUESTIONS 9-10: 1

## 2024-10-11 ASSESSMENT — PAIN DESCRIPTION - DESCRIPTORS: DESCRIPTORS: ACHING

## 2024-10-11 ASSESSMENT — ACTIVITIES OF DAILY LIVING (ADL)
LACK_OF_TRANSPORTATION: NO
LACK_OF_TRANSPORTATION: NO

## 2024-10-11 NOTE — PROGRESS NOTES
Physical Therapy                 Therapy Communication Note    Patient Name: Kay Pike  MRN: [id number]  Department: [address] ICU  Room: 11/11-A  Today's Date: 10/11/2024     Discipline: Physical Therapy    Missed Visit Reason: Missed Visit Reason: Cancel (PT consult received. Chart reviewed. Cheri canceled this am. pt receiving a bedside echo, still requiring BIPAP, and has an elevated respiratory rate.)    Missed Time: Cancel    Comment: PT cheri deferred this am.

## 2024-10-11 NOTE — CONSULTS
"Consults  History Of Present Illness:    Kay Pike is a 59 y.o. female presenting with shortness of breath respiratory failure and chest pain.  EKG showing 1 mm ST elevation lateral leads with reciprocal ST depression inferior leads.  Patient has been having shortness of breath for a week chest tightness for 2 days.  EKG no previous EKG to compare.  Patient with history of morbid obesity smoking hypertension hyperlipidemia.  Patient has been complaining of cough mildly productive.  Likely has history of heart failure diastolic dysfunction.  COPD.    Review of systems.  10 point review of systems otherwise negative..     Last Recorded Vitals:  Vitals:    10/10/24 1945 10/10/24 1952 10/10/24 2034 10/10/24 2035   BP: 155/83  155/70    Pulse: 100  97    Resp: (!) 30  18    Temp:       TempSrc:       SpO2: 98%  99% 99%   Weight:  125 kg (275 lb)     Height:  1.575 m (5' 2\")         Last Labs:  CBC - 10/10/2024:  7:10 PM  16.7 15.6 149    46.5      CMP - 10/10/2024:  7:10 PM  8.6 _ _ --- _   _ _ _ _      PTT - 10/10/2024:  8:00 PM  1.1   11.7 27.6     Troponin I, High Sensitivity   Date/Time Value Ref Range Status   10/10/2024 07:59 PM 24 (H) 0 - 13 ng/L Final   10/10/2024 07:10 PM 24 (H) 0 - 13 ng/L Final     BNP   Date/Time Value Ref Range Status   10/10/2024 07:10  (H) 0 - 99 pg/mL Final     POC HEMOGLOBIN A1c   Date/Time Value Ref Range Status   08/02/2024 10:59 AM 6.9 (A) 4.2 - 6.5 % Final     Hemoglobin A1C   Date/Time Value Ref Range Status   12/23/2020 12:29 PM 6.8 (H) 4.0 - 6.0 % Final     Comment:     Hemoglobin A1C levels are related to mean blood glucose during the   preceding 2-3 months. The relationship table below may be used as a   general guide. Each 1% increase in HGB A1C is a reflection of an   increase in mean glucose of approximately 30 mg/dl.   Reference: Diabetes Care, volume 29, supplement 1 Jan. 2006                        HGB A1C ................. Approx. Mean Glucose   " "_______________________________________________   6%   ...............................  120 mg/dl   7%   ...............................  150 mg/dl   8%   ...............................  180 mg/dl   9%   ...............................  210 mg/dl   10%  ...............................  240 mg/dl  Performed at [address] [address]     03/14/2018 11:33 AM 7.3 (H) 4.0 - 6.0 % Final     Comment:     Hemoglobin A1C levels are related to mean blood glucose during the   preceding 2-3 months. The relationship table below may be used as a   general guide. Each 1% increase in HGB A1C is a reflection of an   increase in mean glucose of approximately 30 mg/dl.   Reference: Diabetes Care, volume 29, supplement 1 Jan. 2006                        HGB A1C ................. Approx. Mean Glucose   _______________________________________________   6%   ...............................  120 mg/dl   7%   ...............................  150 mg/dl   8%   ...............................  180 mg/dl   9%   ...............................  210 mg/dl   10%  ...............................  240 mg/dl  Performed at [address] [address]       LDL Calculated   Date/Time Value Ref Range Status   03/03/2021 11:20 AM 72 65 - 130 MG/DL Final   01/29/2020 10:59 AM 78 65 - 130 MG/DL Final   02/18/2019 10:16 AM 94 65 - 130 MG/DL Final      Last I/O:  No intake/output data recorded.    Past Cardiology Tests (Last 3 Years):  EKG:  No results found for this or any previous visit from the past 1095 days.    Echo:  No results found for this or any previous visit from the past 1095 days.    Ejection Fractions:  No results found for: \"EF\"  Cath:  No results found for this or any previous visit from the past 1095 days.    Stress Test:  No results found for this or any previous visit from the past 1095 days.    Cardiac Imaging:  No results found for this or any previous visit from the past 1095 days.      Past " Medical History:  She has no past medical history on file.    Past Surgical History:  She has no past surgical history on file.      Social History:  She reports that she has been smoking cigarettes. She has never used smokeless tobacco. She reports that she does not drink alcohol and does not use drugs.    Family History:  Family History   Problem Relation Name Age of Onset    Diabetes Mother      Diabetes Father          Allergies:  Patient has no known allergies.    Inpatient Medications:  Scheduled medications   Medication Dose Route Frequency    heparin (porcine)  7,500 Units subcutaneous q8h PORTIA     PRN medications   Medication    lidocaine PF    nitroglycerin     Continuous Medications   Medication Dose Last Rate     Outpatient Medications:  Current Outpatient Medications   Medication Instructions    albuterol 90 mcg/actuation inhaler 2 puffs, inhalation, Every 6 hours PRN    apremilast (Otezla Starter) 10 mg (4)-20 mg (4)-30 mg (47) tablets,dose pack tablet therapy pack 1 tablet, oral, 2 times daily, Do not crush, chew, or split tablets.    escitalopram (LEXAPRO) 10 mg, oral, Daily    fluticasone propion-salmeteroL (Advair Diskus) 500-50 mcg/dose diskus inhaler 1 puff, inhalation, 2 times daily RT    furosemide (LASIX) 40 mg, oral, 2 times daily    levothyroxine (SYNTHROID, LEVOXYL) 100 mcg, oral, Daily before breakfast, Take on an empty stomach    losartan (COZAAR) 50 mg, oral, Daily    metFORMIN (GLUCOPHAGE) 500 mg, oral, 2 times daily (morning and late afternoon)    methylPREDNISolone (Medrol Dospak) 4 mg tablets Take as directed on package.    Mounjaro 2.5 mg, subcutaneous, Once Weekly    Mounjaro 5 mg, subcutaneous, Once Weekly    nebulizer accessories misc 1 Units, miscellaneous, As needed    nebulizer and compressor device 1 Dose, miscellaneous, As needed    potassium chloride ER (Micro-K) 10 mEq ER capsule 10 mEq, oral, Daily, Do not crush or chew.    silver sulfADIAZINE (Silvadene) 1 % cream Apply  to affected area twice a day or with each dressing change.    simvastatin (ZOCOR) 40 mg, oral, Every 24 hours    spironolactone (ALDACTONE) 100 mg, oral, Daily    triamcinolone (Kenalog) 0.1 % cream Topical, 2 times daily, Apply to affected area 1-2 times daily as needed.       Physical Exam:  General: Patient is in  moderate respiratory distress  HEENT: atraumatic normocephalic.  Neck: is supple jugular venous pressure within normal limits no thyromegaly.  Cardiovascular regular rate and rhythm normal heart sounds no murmurs rubs or gallops.  Lungs: Distant lung sounds bilaterally  Abdomen: is soft nontender.  Extremities warm to touch chronic lymphedema nonpitting.  Neurologic examination: patient is awake alert oriented to person, place, date/time.  Psychiatric examination: patient has good insight denies feeling suicidal and depressed.  Pulses 2+ intact bilaterally     Assessment/Plan   #1 lateral ST elevation myocardial infarction secondary to thrombus seen on cardiac catheterization distal diagonal.  No indication for intervention since the lesion is very distally and there is ZAK I flow seen in the artery which is small overall for intervention.  The remainder of her coronaries appears without obstructive coronary disease.  Recommend aspirin Brilinta.  High intensity statin.    2.  Acute on chronic respiratory failure secondary to combination of COPD exacerbation as well as acute decompensated heart failure LV gram on the cardiac cath is suboptimal.  Recommend 2D echo in AM.  Patient will be admitted to the intensive care unit.  Recommend diuresis treatment for COPD exacerbation.    3.  Hypertension.  Restart home medications.  Will monitor.    4.  Hyperlipidemia continue high intensity statin.    5.  Morbid obesity.    Thank you for the consult    Code Status:  No Order      René Kohli MD

## 2024-10-11 NOTE — PROGRESS NOTES
Physical Therapy                 Therapy Communication Note    Patient Name: Kay Pike  MRN: [id number]  Department: [facility] S ICU  Room: 11/11-A  Today's Date: 10/11/2024     Discipline: Physical Therapy    Missed Visit Reason: Missed Visit Reason: Cancel (pt placed back on BIPAP for increased work of breathing on HFNC. Respiratory rate in the 30s despite BIPAP.)    Missed Time: Cancel    Comment: PT eval deferred this date.

## 2024-10-11 NOTE — PROGRESS NOTES
Occupational Therapy                 Therapy Communication Note    Patient Name: Kay Pike  MRN: [id number]  Department: [facility] S ICU  Room: 11/11-A  Today's Date: 10/11/2024     Discipline: Occupational Therapy    Missed Visit Reason: Missed Visit Reason: Cancel (OT consult received. Chart reviewed. Carisaal canceled this am. pt receiving a bedside echo, still requiring BIPAP, and has an elevated respiratory rate.)    Missed Time: Attempt    Comment:

## 2024-10-11 NOTE — PROGRESS NOTES
Subjective Data:  Patient currently somnolent on continuous BiPAP    Overnight Events:    As described below.     Objective Data:  Last Recorded Vitals:  Vitals:    10/11/24 1200 10/11/24 1300 10/11/24 1408 10/11/24 1500   BP: 145/56 (!) 136/47 (!) 139/47 (!) 147/48   Pulse: 89 77 94 79   Resp: (!) 33 26 26 (!) 28   Temp:       TempSrc:       SpO2: 96% 95% 98% 94%   Weight:       Height:           Last Labs:  CBC - 10/11/2024:  5:04 AM  22.6 14.0 145    43.7      CMP - 10/11/2024:  5:04 AM  8.3 6.2 79 --- 0.7   4.8 3.2 39 105      PTT - 10/10/2024:  8:00 PM  1.1   11.7 27.6     TROPHS   Date/Time Value Ref Range Status   10/10/2024 07:59 PM 24 0 - 13 ng/L Final   10/10/2024 07:10 PM 24 0 - 13 ng/L Final     BNP   Date/Time Value Ref Range Status   10/10/2024 07:10  0 - 99 pg/mL Final     HGBA1C   Date/Time Value Ref Range Status   08/02/2024 10:59 AM 6.9 4.2 - 6.5 % Final   12/23/2020 12:29 PM 6.8 4.0 - 6.0 % Final     Comment:     Hemoglobin A1C levels are related to mean blood glucose during the   preceding 2-3 months. The relationship table below may be used as a   general guide. Each 1% increase in HGB A1C is a reflection of an   increase in mean glucose of approximately 30 mg/dl.   Reference: Diabetes Care, volume 29, supplement 1 Jan. 2006                        HGB A1C ................. Approx. Mean Glucose   _______________________________________________   6%   ...............................  120 mg/dl   7%   ...............................  150 mg/dl   8%   ...............................  180 mg/dl   9%   ...............................  210 mg/dl   10%  ...............................  240 mg/dl  Performed at [address] [address]     03/14/2018 11:33 AM 7.3 4.0 - 6.0 % Final     Comment:     Hemoglobin A1C levels are related to mean blood glucose during the   preceding 2-3 months. The relationship table below may be used as a   general guide. Each 1% increase in HGB A1C  "is a reflection of an   increase in mean glucose of approximately 30 mg/dl.   Reference: Diabetes Care, volume 29, supplement 1 Jan. 2006                        HGB A1C ................. Approx. Mean Glucose   _______________________________________________   6%   ...............................  120 mg/dl   7%   ...............................  150 mg/dl   8%   ...............................  180 mg/dl   9%   ...............................  210 mg/dl   10%  ...............................  240 mg/dl  Performed at [address] [address]       LDLCALC   Date/Time Value Ref Range Status   03/03/2021 11:20 AM 72 65 - 130 MG/DL Final   01/29/2020 10:59 AM 78 65 - 130 MG/DL Final   02/18/2019 10:16 AM 94 65 - 130 MG/DL Final      Last I/O:  I/O last 3 completed shifts:  In: - (0 mL/kg)   Out: 2498 (20.6 mL/kg) [Urine:2488 (0.6 mL/kg/hr); Blood:10]  Weight: 121.2 kg     Past Cardiology Tests (Last 3 Years):  EKG:  ECG 12 Lead 10/11/2024 (Preliminary)      ECG 12 Lead 10/10/2024 (Preliminary)    Echo:  No results found for this or any previous visit from the past 1095 days.    Ejection Fractions:  No results found for: \"EF\"  Cath:  No results found for this or any previous visit from the past 1095 days.    Stress Test:  No results found for this or any previous visit from the past 1095 days.    Cardiac Imaging:  No results found for this or any previous visit from the past 1095 days.      Inpatient Medications:  Scheduled medications   Medication Dose Route Frequency    aspirin  81 mg oral Daily    escitalopram  10 mg oral Daily    [Held by provider] fluticasone furoate-vilanteroL  1 puff inhalation Daily    furosemide  40 mg intravenous q8h    [Held by provider] furosemide  40 mg oral BID    heparin (porcine)  7,500 Units subcutaneous q8h PORTIA    insulin lispro  0-15 Units subcutaneous q4h    ipratropium-albuteroL  3 mL nebulization q4h    levothyroxine  100 mcg oral Daily    losartan  50 mg oral " BID    magnesium sulfate  2 g intravenous Once    [Held by provider] metFORMIN  500 mg oral BID    methylPREDNISolone sodium succinate (PF)  40 mg intravenous q6h    metoprolol tartrate  25 mg oral BID    morphine  1 mg intravenous Once    morphine  2 mg intravenous Once    perflutren protein A microsphere  0.5 mL intravenous Once in imaging    piperacillin-tazobactam  3.375 g intravenous q6h    simvastatin  40 mg oral Nightly    sulfur hexafluoride microsphr  2 mL intravenous Once in imaging    ticagrelor  90 mg oral BID    [START ON 10/12/2024] vancomycin  1,750 mg intravenous q12h    vancomycin  2 g intravenous Once     PRN medications   Medication    acetaminophen    dextrose    dextrose    glucagon    glucagon    ipratropium-albuteroL    lidocaine-epinephrine    morphine    nitroglycerin    nitroglycerin    oxygen    oxygen    polyethylene glycol    vancomycin     Continuous Medications   Medication Dose Last Rate       Physical Exam:       Assessment/Plan   10/10:  #1 lateral ST elevation myocardial infarction secondary to thrombus seen on cardiac catheterization distal diagonal.  No indication for intervention since the lesion is very distally and there is ZAK I flow seen in the artery which is small overall for intervention.  The remainder of her coronaries appears without obstructive coronary disease.  Recommend aspirin Brilinta.  High intensity statin.     2.  Acute on chronic respiratory failure secondary to combination of COPD exacerbation as well as acute decompensated heart failure LV gram on the cardiac cath is suboptimal.  Recommend 2D echo in AM.  Patient will be admitted to the intensive care unit.  Recommend diuresis treatment for COPD exacerbation.     3.  Hypertension.  Restart home medications.  Will monitor.     4.  Hyperlipidemia continue high intensity statin.     5.  Morbid obesity.    10/11: The patient was seen and events reviewed discussed with family members and nursing staff.  Patient  noted to have a high lateral wall MI by cardiac cath yesterday evening with evidence of a thrombus within the distal portion of the first diagonal branch.  Echocardiogram performed today reviewed on a [place] basis demonstrates hypokinesis of the distal half of the anteroseptal wall and the LV apex LV ejection fraction in the mid 40% range.  Patient with progressive respiratory failure transition from high flow nasal oxygen to continuous BiPAP.  Patient became hypoxemic when the BiPAP was briefly stopped to administer aspirin and Brilinta.  Patient was given 40 mg of IV Lasix along with IV Solu-Medrol DuoNeb aerosolized treatment.  Currently respiratory rate is 20/min with an O2 sat of 94%.  Chest x-ray showed mild worsening of CHF.  Patient evidently does have baseline COPD according to family members does not require home oxygen but does have a nebulizer.  Patient currently scheduled to receive Lasix 40 mg IV every 8 hours and will increase losartan from 50 mg daily to 50 mg twice daily will begin with Toprol tartrate 25 mg twice daily.  If patient is unable to tolerate oral medications BiPAP the beta-blockade will be given intravenously.  At this point it appears the patient has a mild degree of CHF superimposed on baseline COPD resulting in acute respiratory failure.  Of note the patient's EKG shows sinus rhythm with left axis deviation and LVH by voltage.  Electrolyte panel includes a creatinine of 0.61 glucose of 121.  CBC notable for an increase in WBC to 22,600 possibly related to steroids but patient has been started on IV Zosyn and vancomycin.    Peripheral IV 10/10/24 18 G Right Antecubital (Active)   Site Assessment Clean;Dry;Intact 10/11/24 1200   Dressing Type Transparent 10/11/24 1200   Line Status Saline locked;Capped 10/11/24 1200   Dressing Status Clean;Dry;Occlusive 10/11/24 1200   Number of days: 1       Urethral Catheter (Active)   Output (mL) 75 mL 10/11/24 1500   Number of days: 1       Code  Status:  Full Code    I spent  minutes in the professional and overall care of this patient.        Guy Adkins MD

## 2024-10-11 NOTE — NURSING NOTE
Called to Cath Lab for placement of Bipap, while en route, rapid response called, pt placed on Bipap per verbal order from Dr Kohli, pt tolerated well, this RT stayed during procedure, and transported pt back on Bipap to the ICU, report given to oncoming RT, all questions and concerns answered at this time.

## 2024-10-11 NOTE — ED NOTES
Pt on defib pads and defibrillator. Pt transported to the Cath Lab with this RN and cath lab team     Lisa Duran RN  10/10/24 2027

## 2024-10-11 NOTE — H&P
Critical Care Medicine       Date:  10/10/2024  Patient:  Kay Pike  YOB: 1965  MRN:  [id number]   Admit Date:  10/10/2024      Chief Complaint   Patient presents with    Shortness of Breath     Called ems for SOB. Recently got over cellulitis and on antibiotics. Patient states no chest pain just difficulty breathing. Patient given duoneb  and solumedrol.         History of Present Illness:  Kay Pike is a 59 y.o. year old female patient with Past Medical History of  DMII, hypothroidism, bilateral leg edema, VLADIMIR, HLD, anxiety, asthma and COPD presented to the ER for SOB that has been going on the past week. EMS was called today and she did receive steroids and breathing treatment. She reports that her symptoms have somewhat improved. She does not have any chest pain. Just finished doxy for right leg cellulitis. While in the ER and EKG was obtained showing 1 mm ST elevation lateral leads with reciprocal ST depression inferior leads. No previous EKG. Code STEMI was called and Dr. Kohli took the patient to the cath lab. No intervention was needed.     Interval ICU Events:  10/10: When she arrived to ICU she was tachynpenic in the 50's, increased WOB, tachycardiac . /70 and believe she had flash pulmonary edema. We gave 3 morphine, 40 lasix, cont Bipap, NTG paste. Will obtain ABG.     Objective     No past medical history on file.  No past surgical history on file.  (Not in a hospital admission)    Patient has no known allergies.  Social History     Tobacco Use    Smoking status: Every Day     Types: Cigarettes    Smokeless tobacco: Never   Substance Use Topics    Alcohol use: Never    Drug use: Never     Family History   Problem Relation Name Age of Onset    Diabetes Mother      Diabetes Father         Hospital Medications:           Current Facility-Administered Medications:     heparin (porcine) injection 7,500 Units, 7,500 Units, subcutaneous, q8h René PEARCE MD    lidocaine  PF (Xylocaine) 10 mg/mL (1 %) injection, , , PRN, René Kohli MD, 5 mL at 10/10/24 2048    nitroglycerin (Nitrostat) SL tablet 0.4 mg, 0.4 mg, sublingual, q5 min PRN, Darryl Sahni MD    Current Outpatient Medications:     albuterol 90 mcg/actuation inhaler, Inhale 2 puffs every 6 hours if needed for wheezing., Disp: 25.5 g, Rfl: 0    apremilast (Otezla Starter) 10 mg (4)-20 mg (4)-30 mg (47) tablets,dose pack tablet therapy pack, Take 1 tablet by mouth 2 times a day for 28 days. Do not crush, chew, or split tablets., Disp: 55 each, Rfl: 0    escitalopram (Lexapro) 10 mg tablet, Take 1 tablet (10 mg) by mouth once daily., Disp: 90 tablet, Rfl: 0    fluticasone propion-salmeteroL (Advair Diskus) 500-50 mcg/dose diskus inhaler, Inhale 1 puff 2 times a day., Disp: 60 each, Rfl: 3    furosemide (Lasix) 40 mg tablet, Take 1 tablet (40 mg) by mouth 2 times a day., Disp: 90 tablet, Rfl: 1    levothyroxine (Synthroid, Levoxyl) 100 mcg tablet, Take 1 tablet (100 mcg) by mouth once daily in the morning. Take before meals. Take on an empty stomach, Disp: 90 tablet, Rfl: 1    losartan (Cozaar) 50 mg tablet, Take 1 tablet (50 mg) by mouth once daily., Disp: 90 tablet, Rfl: 3    metFORMIN (Glucophage) 500 mg tablet, Take 1 tablet (500 mg) by mouth 2 times daily (morning and late afternoon)., Disp: 180 tablet, Rfl: 1    methylPREDNISolone (Medrol Dospak) 4 mg tablets, Take as directed on package., Disp: 21 tablet, Rfl: 0    nebulizer accessories misc, 1 Units if needed (SOB)., Disp: 10 each, Rfl: 1    nebulizer and compressor device, 1 Dose if needed (SOB)., Disp: 1 each, Rfl: 0    potassium chloride ER (Micro-K) 10 mEq ER capsule, Take 1 capsule (10 mEq) by mouth once daily. Do not crush or chew., Disp: 90 capsule, Rfl: 1    silver sulfADIAZINE (Silvadene) 1 % cream, Apply to affected area twice a day or with each dressing change., Disp: 400 g, Rfl: 1    simvastatin (Zocor) 40 mg tablet, Take 1 tablet (40 mg) by mouth once  "every 24 hours., Disp: 90 tablet, Rfl: 1    spironolactone (Aldactone) 100 mg tablet, Take 1 tablet (100 mg) by mouth once daily., Disp: 90 tablet, Rfl: 1    tirzepatide (Mounjaro) 2.5 mg/0.5 mL pen injector, Inject 2.5 mg under the skin 1 (one) time per week., Disp: 2 mL, Rfl: 0    tirzepatide (Mounjaro) 5 mg/0.5 mL pen injector, Inject 5 mg under the skin 1 (one) time per week., Disp: 2 mL, Rfl: 2    triamcinolone (Kenalog) 0.1 % cream, Apply topically 2 times a day. Apply to affected area 1-2 times daily as needed., Disp: 454 g, Rfl: 3    Physical Exam:    Heart Rate:  []   Temperature:  [36.8 °C (98.3 °F)]   Respirations:  [18-34]   BP: (148-167)/(56-83)   Height:  [157.5 cm (5' 2\")]   Weight:  [125 kg (275 lb)-127 kg (279 lb)]   Pulse Ox:  [93 %-99 %]     Physical Exam  Constitutional:       General: She is in acute distress.      Appearance: Normal appearance.   HENT:      Head: Normocephalic and atraumatic.      Mouth/Throat:      Mouth: Mucous membranes are dry.   Eyes:      Pupils: Pupils are equal, round, and reactive to light.   Cardiovascular:      Rate and Rhythm: Regular rhythm. Tachycardia present.      Pulses: Normal pulses.      Heart sounds: Normal heart sounds.   Pulmonary:      Effort: Tachypnea and respiratory distress present.      Breath sounds: Decreased breath sounds, wheezing and rales present.   Abdominal:      General: There is distension.      Palpations: Abdomen is soft.      Tenderness: There is no abdominal tenderness.   Musculoskeletal:         General: Swelling present.   Skin:     Findings: Rash present. Rash is crusting and papular.      Comments: Venous stasis dermatitis   Neurological:      General: No focal deficit present.      Mental Status: She is alert.       Review of Systems:  14 point review of systems was completed and negative except for those specially mention in my HPI    I have reviewed all medications, laboratory results, and imaging pertinent for today's " encounter.         No intake or output data in the 24 hours ending 10/10/24 2059         Assessment/Plan:    I am currently managing this critically ill patient for the following problems:    Neuro/Psych/Pain Ctrl/Sedation:  Anxiety  - Pain Management: tylenol  - CAM ICU  - Cont home lexapro    Respiratory/ENT:  Acute hypoxic resp failure 2/2 flash pulmonary edema  COPD  Asthma  - Maintain SPO2 >92%  - Continuous pulse ox monitoring   - Pulm hygiene  - Duonebs q4h  - Cont Bipap     Cardiovascular:  STEMI - no occlusions on heart cath  CHF exacerbation  HTN  HLD  - Continuous cardiac monitoring per ICU protocol  - Maintain MAPS >65  - Daily EKGs prm  - Heart cath 10/10: lateral ST elevation myocardial infarction secondary to thrombus seen on cardiac catheterization distal diagonal. No indication for intervention since the lesion is very distally and there is ZAK I flow seen in the artery which is small overall for intervention. The remainder of her coronaries appears without obstructive coronary disease. Recommend aspirin Brilinta. High intensity statin.   - Cont home losartan, statin, lasix and spirinolactone, ASA  - Start Brillinta  - BNP: 618  - Trop: 24  - Cont NTG gtt  - Cardiology following  - Echo ordered    GI:  Morbid obesity  - NPO until off bipap  - BR with miralax prn    Renal/Volume Status (Intra & Extravascular):  - Maintain randolph catheter  - Maintain urine output 0.5-1.0cc/kg/hr  - Monitor I/O's  - Replete electrolytes to maintain K >4.0 and Mg >2.0  - Daily BMP, Mg  - Cr: 0.70  - 40 lasix IV BID    Endocrine  DMII  Hypothyroidism  - SSI q4hrs while NPO  - Cont home synthroid   - Monitor for hyper/hypoglycemia     Infectious Disease:  No fevers  - Monitor SIRS criteria  - WBC: 16.7    Heme/Onc:  - Monitor for s/sx of anemia such as bleeding and bruising   - Transfuse if Hgb <7.0   - Daily CBC  - Hgb: 15.6    MSK:  - Padded pressure points   - PT/OT    Skin  - ICU skin protocol    Ethics/Code  Status:  Full Code    :  DVT Prophylaxis: SQH  GI Prophylaxis: None  Bowel Regimen: Miralax prn  Diet: NPO  CVC: None  [place]: None  [place]: Yes  Restraints: None  Dispo: Admit to ICU    Critical Care Time:  74 minutes spent in preparing to see patient (I.e.labs,imaging, etc.), documentation, discussion plan of care with patient/family/caregiver, and/ or coordination of care with multidisciplinary team including the attending. Time does not include completion of procedure time.     Nan Santos PA-C  Pulmonology & Critical Care Medicine   [facility]

## 2024-10-11 NOTE — PROGRESS NOTES
10/11/24 1607   Physical Activity   On average, how many days per week do you engage in moderate to strenuous exercise (like a brisk walk)? 0 days   On average, how many minutes do you engage in exercise at this level? 0 min   Financial Resource Strain   How hard is it for you to pay for the very basics like food, housing, medical care, and heating? Somewhat   Housing Stability   In the last 12 months, was there a time when you were not able to pay the mortgage or rent on time? Y   In the past 12 months, how many times have you moved where you were living? 0   At any time in the past 12 months, were you homeless or living in a shelter (including now)? N   Transportation Needs   In the past 12 months, has lack of transportation kept you from medical appointments or from getting medications? no   In the past 12 months, has lack of transportation kept you from meetings, work, or from getting things needed for daily living? No   Food Insecurity   Within the past 12 months, you worried that your food would run out before you got the money to buy more. Sometimes   Within the past 12 months, the food you bought just didn't last and you didn't have money to get more. Sometimes   Social Connections   In a typical week, how many times do you talk on the phone with family, friends, or neighbors? Twice a week   How often do you get together with friends or relatives? Once   How often do you attend Temple or Denominational services? Never   Do you belong to any clubs or organizations such as Temple groups, unions, fraternal or athletic groups, or school groups? No   How often do you attend meetings of the clubs or organizations you belong to? Never   Are you , , , , never , or living with a partner?    Intimate Partner Violence   Within the last year, have you been afraid of your partner or ex-partner? No   Within the last year, have you been humiliated or emotionally abused in other  ways by your partner or ex-partner? No   Within the last year, have you been kicked, hit, slapped, or otherwise physically hurt by your partner or ex-partner? No   Within the last year, have you been raped or forced to have any kind of sexual activity by your partner or ex-partner? No   Alcohol Use   Q1: How often do you have a drink containing alcohol? Never   Q2: How many drinks containing alcohol do you have on a typical day when you are drinking? None   Q3: How often do you have six or more drinks on one occasion? Never   Utilities   In the past 12 months has the electric, gas, oil, or water company threatened to shut off services in your home? Yes   Health Literacy   How often do you need to have someone help you when you read instructions, pamphlets, or other written material from your doctor or pharmacy? Never

## 2024-10-11 NOTE — CONSULTS
Vancomycin Dosing by Pharmacy- INITIAL    Kay Pike is a 59 y.o. year old female who Pharmacy has been consulted for vancomycin dosing for pneumonia. Based on the patient's indication and renal status this patient will be dosed based on a goal AUC of 400-600.     Renal function is currently stable.    Visit Vitals  BP (!) 139/47   Pulse 94   Temp 37 °C (98.6 °F) (Oral)   Resp 26        Lab Results   Component Value Date    CREATININE 0.61 10/11/2024    CREATININE 0.70 10/10/2024    CREATININE 0.7 2021    CREATININE 0.6 2020    CREATININE 0.7 2020    CREATININE 0.7 2019        Patient weight is as follows:   Vitals:    10/11/24 0747   Weight: 121 kg (267 lb)       Cultures:  No results found for the encounter in last 14 days.        I/O last 3 completed shifts:  In: - (0 mL/kg)   Out: 2498 (20.6 mL/kg) [Urine:2488 (0.6 mL/kg/hr); Blood:10]  Weight: 121.2 kg   I/O during current shift:  I/O this shift:  In: 120 [P.O.:120]  Out: 1228 [Urine:1228]    Temp (24hrs), Av.9 °C (98.4 °F), Min:36.7 °C (98.1 °F), Max:37 °C (98.6 °F)         Assessment/Plan     Patient will be given a loading dose of 2000 mg.  Will initiate vancomycin maintenance,  1750 mg every 12 hours.    This dosing regimen is predicted by [employer] to result in the following pharmacokinetic parameters:    Loading dose: 2000 mg at 15:00 10/11/2024.  Regimen: 1750 mg IV every 12 hours.  Start time: 06:00 on 10/12/2024  Exposure target: AUC24 (range)400-600 mg/L.hr   [id number]: 488 mg/L.hr  AUC24,ss: 490 mg/L.hr  Probability of AUC24 > 400: 65 %  Ctrough,ss: 10.9 mg/L  Probability of Ctrough,ss > 20: 26 %    Follow-up level will be ordered on 10/12 at 0500 unless clinically indicated sooner.  Will continue to monitor renal function daily while on vancomycin and order serum creatinine at least every 48 hours if not already ordered.  Follow for continued vancomycin needs, clinical response, and signs/symptoms of toxicity.        Jonna Callahan, PharmD

## 2024-10-11 NOTE — PROGRESS NOTES
[facility] Pulmonary and Critical Care Medicine   Progress Note        Subjective   On bipap all night. Wakes to questions but quickly falls asleep. TV low 200s and increased RR on the bipap.    Scheduled Medications:   aspirin, 81 mg, oral, Daily  escitalopram, 10 mg, oral, Daily  [Held by provider] fluticasone furoate-vilanteroL, 1 puff, inhalation, Daily  furosemide, 40 mg, intravenous, q12h  [Held by provider] furosemide, 40 mg, oral, BID  heparin (porcine), 7,500 Units, subcutaneous, q8h PORTIA  insulin lispro, 0-15 Units, subcutaneous, q4h  levothyroxine, 100 mcg, oral, Daily  losartan, 50 mg, oral, Daily  [Held by provider] metFORMIN, 500 mg, oral, BID  morphine, 1 mg, intravenous, Once  morphine, 2 mg, intravenous, Once  perflutren protein A microsphere, 0.5 mL, intravenous, Once in imaging  simvastatin, 40 mg, oral, Nightly  [Held by provider] spironolactone, 100 mg, oral, Daily  sulfur hexafluoride microsphr, 2 mL, intravenous, Once in imaging  ticagrelor, 90 mg, oral, BID         Continuous Medications:         PRN Medications:   PRN medications: acetaminophen, dextrose, dextrose, glucagon, glucagon, ipratropium-albuteroL, lidocaine-epinephrine, morphine, nitroglycerin, nitroglycerin, oxygen, oxygen, polyethylene glycol      Objective   Vitals:  Most Recent:  Vitals:    10/11/24 0900   BP: 139/55   Pulse: 85   Resp: 26   Temp:    SpO2: 96%       24hr Min/Max:  Temp  Min: 36.7 °C (98.1 °F)  Max: 36.9 °C (98.4 °F)  Pulse  Min: 72  Max: 107  BP  Min: 120/57  Max: 193/68  Resp  Min: 16  Max: 44  SpO2  Min: 90 %  Max: 100 %    LDA:   Urethral Catheter (Active)   Placement Date: 10/10/24   Hand Hygiene Completed: Yes  Urine Returned: Yes   Number of days: 1         Vent settings:  FiO2 (%):  [40 %-65 %] 40 %  S RR:  [18] 18    Hemodynamic parameters for last 24 hours:         Intake/Output Summary (Last 24 hours) at 10/11/2024 0987  Last data filed at 10/11/2024 0552  Gross per 24 hour   Intake --    Output 2460 ml   Net -2460 ml         Physical exam:    Physical Exam  Vitals reviewed.   Constitutional:       Appearance: Normal appearance.   HENT:      Head: Normocephalic and atraumatic.      Mouth/Throat:      Mouth: Mucous membranes are moist.   Eyes:      Extraocular Movements: Extraocular movements intact.      Pupils: Pupils are equal, round, and reactive to light.   Cardiovascular:      Rate and Rhythm: Normal rate and regular rhythm.   Pulmonary:      Effort: Pulmonary effort is normal.      Breath sounds: Normal breath sounds and air entry.   Abdominal:      General: Abdomen is flat. Bowel sounds are normal.      Palpations: Abdomen is soft.   Musculoskeletal:         General: Normal range of motion.      Cervical back: Normal range of motion and neck supple.   Skin:     General: Skin is warm and dry.   Neurological:      General: No focal deficit present.      Mental Status: She is alert and oriented to person, place, and time. Mental status is at baseline.          Lab/Radiology/Diagnostic Review:    All labs and Imaging have been personally reviewed.       Assessment/Plan       Neuro/Psych/Pain Ctrl/Sedation:  Anxiety  - Pain Management: tylenol  - CAM ICU  - Cont home lexapro     Respiratory/ENT:  Acute hypoxic resp failure 2/2 flash pulmonary edema  COPD  Asthma  - Maintain SPO2 >92%  - Continuous pulse ox monitoring   - Pulm hygiene  - Duonebs q4h  - Cont Bipap     - Settings adjusted    - repeat abg in 2 hours     Cardiovascular:  STEMI - no occlusions on heart cath  CHF exacerbation  HTN  HLD  - Continuous cardiac monitoring per ICU protocol  - Maintain MAPS >65  - Daily EKGs prm  - Heart cath 10/10: lateral ST elevation myocardial infarction secondary to thrombus seen on cardiac catheterization distal diagonal. No indication for intervention since the lesion is very distally and there is ZAK I flow seen in the artery which is small overall for intervention. The remainder of her coronaries  appears without obstructive coronary disease. Recommend aspirin Brilinta. High intensity statin.   - Cont home losartan, statin, lasix and spirinolactone, ASA  - Start Brillinta  - BNP: 618  - Trop: 24  - Cont NTG gtt  - Cardiology following  - Echo ordered     GI:  Morbid obesity  - NPO until off bipap  - BR with miralax prn     Renal/Volume Status (Intra & Extravascular):  - Maintain randolph catheter  - Maintain urine output 0.5-1.0cc/kg/hr  - Monitor I/O's  - Replete electrolytes to maintain K >4.0 and Mg >2.0  - Daily BMP, Mg  - Cr: 0.70  - 40 lasix IV BID    Endocrine  DMII  Hypothyroidism  - SSI q4hrs while NPO  - Cont home synthroid   - Monitor for hyper/hypoglycemia      Infectious Disease:  No fevers  - Monitor SIRS criteria  - WBC: 16.7     Heme/Onc:  - Monitor for s/sx of anemia such as bleeding and bruising   - Transfuse if Hgb <7.0   - Daily CBC  - Hgb: 15.6     MSK:  - Padded pressure points   - PT/OT     Skin  - ICU skin protocol     Ethics/Code Status:  Full Code     :  DVT Prophylaxis: SQH  GI Prophylaxis: None  Bowel Regimen: Miralax prn  Diet: NPO  CVC: None  Virgie: None  Randolph: Yes  Restraints: None  Dispo: Admit to ICU       I have personally spent 45 minutes of critical care time, exclusive of time spent on any procedures, in evaluation and management of this critically ill patient’s condition mentioned above in the assessment and plan.     Uriah Gutierrez MD  Pulmonary & Critical Care Attending   [phone]    Please excuse any typographical or unwanted errors within this documentation as voice recognition software was used to dictate this note.

## 2024-10-11 NOTE — PROGRESS NOTES
Occupational Therapy                 Therapy Communication Note    Patient Name: Kay Pike  MRN: [id number]  Department: [facility] 3 S ICU  Room: 11/11-A  Today's Date: 10/11/2024     Discipline: Occupational Therapy    Missed Visit Reason: Missed Visit Reason:  (pt placed back on BIPAP for increased work of breathing on HFNC. Respiratory rate in the 30s despite BIPAP)    Missed Time: Cancel    Comment:

## 2024-10-12 ENCOUNTER — APPOINTMENT (OUTPATIENT)
Dept: RADIOLOGY | Facility: HOSPITAL | Age: 59
DRG: 280 | End: 2024-10-12
Payer: COMMERCIAL

## 2024-10-12 LAB
ALBUMIN SERPL BCP-MCNC: 3.1 G/DL (ref 3.4–5)
ALBUMIN SERPL BCP-MCNC: 3.1 G/DL (ref 3.4–5)
ALP SERPL-CCNC: 92 U/L (ref 33–110)
ALT SERPL W P-5'-P-CCNC: 43 U/L (ref 7–45)
ANION GAP SERPL CALCULATED.3IONS-SCNC: 10 MMOL/L (ref 10–20)
ANION GAP SERPL CALCULATED.3IONS-SCNC: 9 MMOL/L (ref 10–20)
AST SERPL W P-5'-P-CCNC: 78 U/L (ref 9–39)
ATRIAL RATE: 97 BPM
BASOPHILS # BLD AUTO: 0.03 X10*3/UL (ref 0–0.1)
BASOPHILS NFR BLD AUTO: 0.2 %
BILIRUB SERPL-MCNC: 0.7 MG/DL (ref 0–1.2)
BUN SERPL-MCNC: 25 MG/DL (ref 6–23)
BUN SERPL-MCNC: 31 MG/DL (ref 6–23)
CALCIUM SERPL-MCNC: 8.4 MG/DL (ref 8.6–10.3)
CALCIUM SERPL-MCNC: 8.4 MG/DL (ref 8.6–10.3)
CHLORIDE SERPL-SCNC: 98 MMOL/L (ref 98–107)
CHLORIDE SERPL-SCNC: 99 MMOL/L (ref 98–107)
CO2 SERPL-SCNC: 32 MMOL/L (ref 21–32)
CO2 SERPL-SCNC: 33 MMOL/L (ref 21–32)
CREAT SERPL-MCNC: 0.73 MG/DL (ref 0.5–1.05)
CREAT SERPL-MCNC: 0.75 MG/DL (ref 0.5–1.05)
EGFRCR SERPLBLD CKD-EPI 2021: >90 ML/MIN/1.73M*2
EGFRCR SERPLBLD CKD-EPI 2021: >90 ML/MIN/1.73M*2
EOSINOPHIL # BLD AUTO: 0 X10*3/UL (ref 0–0.7)
EOSINOPHIL NFR BLD AUTO: 0 %
ERYTHROCYTE [DISTWIDTH] IN BLOOD BY AUTOMATED COUNT: 18.5 % (ref 11.5–14.5)
GLUCOSE BLD MANUAL STRIP-MCNC: 159 MG/DL (ref 74–99)
GLUCOSE BLD MANUAL STRIP-MCNC: 159 MG/DL (ref 74–99)
GLUCOSE BLD MANUAL STRIP-MCNC: 166 MG/DL (ref 74–99)
GLUCOSE BLD MANUAL STRIP-MCNC: 168 MG/DL (ref 74–99)
GLUCOSE BLD MANUAL STRIP-MCNC: 267 MG/DL (ref 74–99)
GLUCOSE SERPL-MCNC: 147 MG/DL (ref 74–99)
GLUCOSE SERPL-MCNC: 159 MG/DL (ref 74–99)
HCT VFR BLD AUTO: 42.4 % (ref 36–46)
HGB BLD-MCNC: 13.4 G/DL (ref 12–16)
IMM GRANULOCYTES # BLD AUTO: 0.39 X10*3/UL (ref 0–0.7)
IMM GRANULOCYTES NFR BLD AUTO: 2.6 % (ref 0–0.9)
LYMPHOCYTES # BLD AUTO: 0.56 X10*3/UL (ref 1.2–4.8)
LYMPHOCYTES NFR BLD AUTO: 3.7 %
MAGNESIUM SERPL-MCNC: 2.19 MG/DL (ref 1.6–2.4)
MAGNESIUM SERPL-MCNC: 2.29 MG/DL (ref 1.6–2.4)
MCH RBC QN AUTO: 26.4 PG (ref 26–34)
MCHC RBC AUTO-ENTMCNC: 31.6 G/DL (ref 32–36)
MCV RBC AUTO: 84 FL (ref 80–100)
MONOCYTES # BLD AUTO: 0.24 X10*3/UL (ref 0.1–1)
MONOCYTES NFR BLD AUTO: 1.6 %
NEUTROPHILS # BLD AUTO: 13.85 X10*3/UL (ref 1.2–7.7)
NEUTROPHILS NFR BLD AUTO: 91.9 %
NRBC BLD-RTO: 0 /100 WBCS (ref 0–0)
P AXIS: 66 DEGREES
P OFFSET: 199 MS
P ONSET: 137 MS
PHOSPHATE SERPL-MCNC: 3.9 MG/DL (ref 2.5–4.9)
PHOSPHATE SERPL-MCNC: 4.5 MG/DL (ref 2.5–4.9)
PLATELET # BLD AUTO: 151 X10*3/UL (ref 150–450)
POTASSIUM SERPL-SCNC: 4 MMOL/L (ref 3.5–5.3)
POTASSIUM SERPL-SCNC: 4.3 MMOL/L (ref 3.5–5.3)
PR INTERVAL: 158 MS
PROT SERPL-MCNC: 6.1 G/DL (ref 6.4–8.2)
Q ONSET: 216 MS
QRS COUNT: 15 BEATS
QRS DURATION: 122 MS
QT INTERVAL: 368 MS
QTC CALCULATION(BAZETT): 467 MS
QTC FREDERICIA: 431 MS
R AXIS: 164 DEGREES
RBC # BLD AUTO: 5.07 X10*6/UL (ref 4–5.2)
SODIUM SERPL-SCNC: 136 MMOL/L (ref 136–145)
SODIUM SERPL-SCNC: 137 MMOL/L (ref 136–145)
T AXIS: 8 DEGREES
T OFFSET: 400 MS
VANCOMYCIN SERPL-MCNC: 10.3 UG/ML (ref 5–20)
VENTRICULAR RATE: 97 BPM
WBC # BLD AUTO: 15.1 X10*3/UL (ref 4.4–11.3)

## 2024-10-12 PROCEDURE — 2500000004 HC RX 250 GENERAL PHARMACY W/ HCPCS (ALT 636 FOR OP/ED): Performed by: INTERNAL MEDICINE

## 2024-10-12 PROCEDURE — 2500000001 HC RX 250 WO HCPCS SELF ADMINISTERED DRUGS (ALT 637 FOR MEDICARE OP): Performed by: INTERNAL MEDICINE

## 2024-10-12 PROCEDURE — 2500000004 HC RX 250 GENERAL PHARMACY W/ HCPCS (ALT 636 FOR OP/ED): Mod: JZ | Performed by: INTERNAL MEDICINE

## 2024-10-12 PROCEDURE — 2500000001 HC RX 250 WO HCPCS SELF ADMINISTERED DRUGS (ALT 637 FOR MEDICARE OP)

## 2024-10-12 PROCEDURE — 85025 COMPLETE CBC W/AUTO DIFF WBC: CPT

## 2024-10-12 PROCEDURE — 2500000002 HC RX 250 W HCPCS SELF ADMINISTERED DRUGS (ALT 637 FOR MEDICARE OP, ALT 636 FOR OP/ED)

## 2024-10-12 PROCEDURE — 84100 ASSAY OF PHOSPHORUS: CPT

## 2024-10-12 PROCEDURE — 80069 RENAL FUNCTION PANEL: CPT | Mod: CCI

## 2024-10-12 PROCEDURE — 97110 THERAPEUTIC EXERCISES: CPT | Mod: GO

## 2024-10-12 PROCEDURE — 97165 OT EVAL LOW COMPLEX 30 MIN: CPT | Mod: GO

## 2024-10-12 PROCEDURE — 84075 ASSAY ALKALINE PHOSPHATASE: CPT

## 2024-10-12 PROCEDURE — 94660 CPAP INITIATION&MGMT: CPT

## 2024-10-12 PROCEDURE — 82947 ASSAY GLUCOSE BLOOD QUANT: CPT

## 2024-10-12 PROCEDURE — 83735 ASSAY OF MAGNESIUM: CPT

## 2024-10-12 PROCEDURE — 2500000002 HC RX 250 W HCPCS SELF ADMINISTERED DRUGS (ALT 637 FOR MEDICARE OP, ALT 636 FOR OP/ED): Performed by: INTERNAL MEDICINE

## 2024-10-12 PROCEDURE — 2020000001 HC ICU ROOM DAILY

## 2024-10-12 PROCEDURE — 99291 CRITICAL CARE FIRST HOUR: CPT | Performed by: INTERNAL MEDICINE

## 2024-10-12 PROCEDURE — 2500000001 HC RX 250 WO HCPCS SELF ADMINISTERED DRUGS (ALT 637 FOR MEDICARE OP): Performed by: STUDENT IN AN ORGANIZED HEALTH CARE EDUCATION/TRAINING PROGRAM

## 2024-10-12 PROCEDURE — 94640 AIRWAY INHALATION TREATMENT: CPT

## 2024-10-12 PROCEDURE — 71045 X-RAY EXAM CHEST 1 VIEW: CPT | Performed by: RADIOLOGY

## 2024-10-12 PROCEDURE — 71045 X-RAY EXAM CHEST 1 VIEW: CPT

## 2024-10-12 PROCEDURE — 2500000004 HC RX 250 GENERAL PHARMACY W/ HCPCS (ALT 636 FOR OP/ED)

## 2024-10-12 PROCEDURE — 36415 COLL VENOUS BLD VENIPUNCTURE: CPT

## 2024-10-12 PROCEDURE — 99233 SBSQ HOSP IP/OBS HIGH 50: CPT | Performed by: INTERNAL MEDICINE

## 2024-10-12 PROCEDURE — 80202 ASSAY OF VANCOMYCIN: CPT | Performed by: INTERNAL MEDICINE

## 2024-10-12 PROCEDURE — 9420000001 HC RT PATIENT EDUCATION 5 MIN

## 2024-10-12 RX ORDER — INSULIN LISPRO 100 [IU]/ML
0-15 INJECTION, SOLUTION INTRAVENOUS; SUBCUTANEOUS
Status: DISCONTINUED | OUTPATIENT
Start: 2024-10-12 | End: 2024-10-24 | Stop reason: HOSPADM

## 2024-10-12 RX ORDER — METOPROLOL SUCCINATE 25 MG/1
25 TABLET, EXTENDED RELEASE ORAL DAILY
Status: DISCONTINUED | OUTPATIENT
Start: 2024-10-12 | End: 2024-10-14

## 2024-10-12 RX ORDER — LOSARTAN POTASSIUM 50 MG/1
50 TABLET ORAL DAILY
Status: DISCONTINUED | OUTPATIENT
Start: 2024-10-13 | End: 2024-10-24 | Stop reason: HOSPADM

## 2024-10-12 RX ORDER — HYDROXYZINE HYDROCHLORIDE 25 MG/1
25 TABLET, FILM COATED ORAL EVERY 6 HOURS PRN
Status: DISCONTINUED | OUTPATIENT
Start: 2024-10-12 | End: 2024-10-24 | Stop reason: HOSPADM

## 2024-10-12 RX ORDER — VANCOMYCIN 1.75 G/350ML
1250 INJECTION, SOLUTION INTRAVENOUS EVERY 12 HOURS
Status: DISCONTINUED | OUTPATIENT
Start: 2024-10-12 | End: 2024-10-14

## 2024-10-12 RX ORDER — NYSTATIN 100000 [USP'U]/G
1 POWDER TOPICAL 2 TIMES DAILY
Status: DISCONTINUED | OUTPATIENT
Start: 2024-10-12 | End: 2024-10-24 | Stop reason: HOSPADM

## 2024-10-12 RX ADMIN — INSULIN LISPRO 3 UNITS: 100 INJECTION, SOLUTION INTRAVENOUS; SUBCUTANEOUS at 08:31

## 2024-10-12 RX ADMIN — INSULIN LISPRO 3 UNITS: 100 INJECTION, SOLUTION INTRAVENOUS; SUBCUTANEOUS at 17:38

## 2024-10-12 RX ADMIN — IPRATROPIUM BROMIDE AND ALBUTEROL SULFATE 3 ML: 2.5; .5 SOLUTION RESPIRATORY (INHALATION) at 03:29

## 2024-10-12 RX ADMIN — INSULIN LISPRO 3 UNITS: 100 INJECTION, SOLUTION INTRAVENOUS; SUBCUTANEOUS at 04:13

## 2024-10-12 RX ADMIN — TICAGRELOR 90 MG: 90 TABLET ORAL at 10:23

## 2024-10-12 RX ADMIN — FUROSEMIDE 40 MG: 10 INJECTION, SOLUTION INTRAMUSCULAR; INTRAVENOUS at 02:48

## 2024-10-12 RX ADMIN — NYSTATIN 1 APPLICATION: 100000 POWDER TOPICAL at 21:07

## 2024-10-12 RX ADMIN — VANCOMYCIN 1250 MG: 1.75 INJECTION, SOLUTION INTRAVENOUS at 17:41

## 2024-10-12 RX ADMIN — PIPERACILLIN SODIUM AND TAZOBACTAM SODIUM 3.38 G: 3; .375 INJECTION, SOLUTION INTRAVENOUS at 15:20

## 2024-10-12 RX ADMIN — LOSARTAN POTASSIUM 50 MG: 50 TABLET, FILM COATED ORAL at 10:22

## 2024-10-12 RX ADMIN — HEPARIN SODIUM 7500 UNITS: 5000 INJECTION, SOLUTION INTRAVENOUS; SUBCUTANEOUS at 06:15

## 2024-10-12 RX ADMIN — LEVOTHYROXINE SODIUM 100 MCG: 0.1 TABLET ORAL at 06:18

## 2024-10-12 RX ADMIN — VANCOMYCIN 1750 MG: 1.25 INJECTION, SOLUTION INTRAVENOUS at 06:18

## 2024-10-12 RX ADMIN — METHYLPREDNISOLONE SODIUM SUCCINATE 40 MG: 40 INJECTION, POWDER, FOR SOLUTION INTRAMUSCULAR; INTRAVENOUS at 00:25

## 2024-10-12 RX ADMIN — HEPARIN SODIUM 7500 UNITS: 5000 INJECTION, SOLUTION INTRAVENOUS; SUBCUTANEOUS at 13:58

## 2024-10-12 RX ADMIN — NYSTATIN 1 APPLICATION: 100000 POWDER TOPICAL at 09:28

## 2024-10-12 RX ADMIN — FUROSEMIDE 40 MG: 10 INJECTION, SOLUTION INTRAMUSCULAR; INTRAVENOUS at 11:15

## 2024-10-12 RX ADMIN — INSULIN LISPRO 9 UNITS: 100 INJECTION, SOLUTION INTRAVENOUS; SUBCUTANEOUS at 20:59

## 2024-10-12 RX ADMIN — METHYLPREDNISOLONE SODIUM SUCCINATE 40 MG: 40 INJECTION, POWDER, FOR SOLUTION INTRAMUSCULAR; INTRAVENOUS at 12:02

## 2024-10-12 RX ADMIN — METHYLPREDNISOLONE SODIUM SUCCINATE 40 MG: 40 INJECTION, POWDER, FOR SOLUTION INTRAMUSCULAR; INTRAVENOUS at 06:18

## 2024-10-12 RX ADMIN — IPRATROPIUM BROMIDE AND ALBUTEROL SULFATE 3 ML: 2.5; .5 SOLUTION RESPIRATORY (INHALATION) at 11:29

## 2024-10-12 RX ADMIN — HYDROXYZINE HYDROCHLORIDE 25 MG: 25 TABLET, FILM COATED ORAL at 21:11

## 2024-10-12 RX ADMIN — POLYETHYLENE GLYCOL 3350 17 G: 17 POWDER, FOR SOLUTION ORAL at 13:27

## 2024-10-12 RX ADMIN — INSULIN LISPRO 3 UNITS: 100 INJECTION, SOLUTION INTRAVENOUS; SUBCUTANEOUS at 12:01

## 2024-10-12 RX ADMIN — PIPERACILLIN SODIUM AND TAZOBACTAM SODIUM 3.38 G: 3; .375 INJECTION, SOLUTION INTRAVENOUS at 20:59

## 2024-10-12 RX ADMIN — ESCITALOPRAM OXALATE 10 MG: 10 TABLET ORAL at 10:23

## 2024-10-12 RX ADMIN — METOPROLOL SUCCINATE 25 MG: 25 TABLET, FILM COATED, EXTENDED RELEASE ORAL at 11:15

## 2024-10-12 RX ADMIN — METOPROLOL TARTRATE 5 MG: 5 INJECTION INTRAVENOUS at 04:17

## 2024-10-12 RX ADMIN — METHYLPREDNISOLONE SODIUM SUCCINATE 40 MG: 40 INJECTION, POWDER, FOR SOLUTION INTRAMUSCULAR; INTRAVENOUS at 18:05

## 2024-10-12 RX ADMIN — SIMVASTATIN 40 MG: 40 TABLET, FILM COATED ORAL at 20:59

## 2024-10-12 RX ADMIN — PIPERACILLIN SODIUM AND TAZOBACTAM SODIUM 3.38 G: 3; .375 INJECTION, SOLUTION INTRAVENOUS at 02:48

## 2024-10-12 RX ADMIN — FUROSEMIDE 40 MG: 10 INJECTION, SOLUTION INTRAMUSCULAR; INTRAVENOUS at 19:40

## 2024-10-12 RX ADMIN — DEXMEDETOMIDINE HYDROCHLORIDE 0.1 MCG/KG/HR: 4 INJECTION, SOLUTION INTRAVENOUS at 21:30

## 2024-10-12 RX ADMIN — IPRATROPIUM BROMIDE AND ALBUTEROL SULFATE 3 ML: 2.5; .5 SOLUTION RESPIRATORY (INHALATION) at 22:54

## 2024-10-12 RX ADMIN — ASPIRIN 81 MG: 81 TABLET, COATED ORAL at 10:23

## 2024-10-12 RX ADMIN — IPRATROPIUM BROMIDE AND ALBUTEROL SULFATE 3 ML: 2.5; .5 SOLUTION RESPIRATORY (INHALATION) at 07:22

## 2024-10-12 RX ADMIN — IPRATROPIUM BROMIDE AND ALBUTEROL SULFATE 3 ML: 2.5; .5 SOLUTION RESPIRATORY (INHALATION) at 20:03

## 2024-10-12 RX ADMIN — PIPERACILLIN SODIUM AND TAZOBACTAM SODIUM 3.38 G: 3; .375 INJECTION, SOLUTION INTRAVENOUS at 09:23

## 2024-10-12 RX ADMIN — IPRATROPIUM BROMIDE AND ALBUTEROL SULFATE 3 ML: 2.5; .5 SOLUTION RESPIRATORY (INHALATION) at 00:22

## 2024-10-12 RX ADMIN — IPRATROPIUM BROMIDE AND ALBUTEROL SULFATE 3 ML: 2.5; .5 SOLUTION RESPIRATORY (INHALATION) at 14:56

## 2024-10-12 RX ADMIN — TICAGRELOR 90 MG: 90 TABLET ORAL at 20:59

## 2024-10-12 RX ADMIN — HEPARIN SODIUM 7500 UNITS: 5000 INJECTION, SOLUTION INTRAVENOUS; SUBCUTANEOUS at 21:00

## 2024-10-12 ASSESSMENT — PAIN SCALES - GENERAL
PAINLEVEL_OUTOF10: 0 - NO PAIN

## 2024-10-12 ASSESSMENT — COGNITIVE AND FUNCTIONAL STATUS - GENERAL
DRESSING REGULAR UPPER BODY CLOTHING: A LITTLE
TOILETING: A LITTLE
HELP NEEDED FOR BATHING: A LOT
DRESSING REGULAR LOWER BODY CLOTHING: A LOT
DAILY ACTIVITIY SCORE: 18

## 2024-10-12 ASSESSMENT — PAIN - FUNCTIONAL ASSESSMENT
PAIN_FUNCTIONAL_ASSESSMENT: 0-10

## 2024-10-12 ASSESSMENT — ACTIVITIES OF DAILY LIVING (ADL)
ADL_ASSISTANCE: INDEPENDENT
BATHING_ASSISTANCE: NOT PERFORMED

## 2024-10-12 NOTE — PROGRESS NOTES
Subjective Data:  Patient is doing better today.  Off the BiPAP.  Lying comfortable in bed.  Nuys any chest pain.    Overnight Events:    Telemetry overnight reviewed no events     Objective Data:  Last Recorded Vitals:  Vitals:    10/12/24 0800 10/12/24 0900 10/12/24 1000 10/12/24 1011   BP: 114/50 (!) 107/43 (!) 108/44    Pulse: 84 79 77    Resp: (!) 27 (!) 27 24    Temp: 36.8 °C (98.2 °F)      TempSrc: Axillary      SpO2: 95% 95% 95%    Weight:    125 kg (276 lb 3.8 oz)   Height:           Last Labs:  CBC - 10/12/2024:  4:36 AM  15.1 13.4 151    42.4      CMP - 10/12/2024:  4:36 AM  8.4 6.1 78 --- 0.7   4.5 3.1 43 92      PTT - 10/10/2024:  8:00 PM  1.1   11.7 27.6     TROPHS   Date/Time Value Ref Range Status   10/11/2024 06:18 PM 33,613 0 - 13 ng/L Final   10/10/2024 07:59 PM 24 0 - 13 ng/L Final   10/10/2024 07:10 PM 24 0 - 13 ng/L Final     BNP   Date/Time Value Ref Range Status   10/10/2024 07:10  0 - 99 pg/mL Final     HGBA1C   Date/Time Value Ref Range Status   08/02/2024 10:59 AM 6.9 4.2 - 6.5 % Final   12/23/2020 12:29 PM 6.8 4.0 - 6.0 % Final     Comment:     Hemoglobin A1C levels are related to mean blood glucose during the   preceding 2-3 months. The relationship table below may be used as a   general guide. Each 1% increase in HGB A1C is a reflection of an   increase in mean glucose of approximately 30 mg/dl.   Reference: Diabetes Care, volume 29, supplement 1 Jan. 2006                        HGB A1C ................. Approx. Mean Glucose   _______________________________________________   6%   ...............................  120 mg/dl   7%   ...............................  150 mg/dl   8%   ...............................  180 mg/dl   9%   ...............................  210 mg/dl   10%  ...............................  240 mg/dl  Performed at [address] [facility] [address]     03/14/2018 11:33 AM 7.3 4.0 - 6.0 % Final     Comment:     Hemoglobin A1C levels are related to  mean blood glucose during the   preceding 2-3 months. The relationship table below may be used as a   general guide. Each 1% increase in HGB A1C is a reflection of an   increase in mean glucose of approximately 30 mg/dl.   Reference: Diabetes Care, volume 29, supplement 1 Jan. 2006                        HGB A1C ................. Approx. Mean Glucose   _______________________________________________   6%   ...............................  120 mg/dl   7%   ...............................  150 mg/dl   8%   ...............................  180 mg/dl   9%   ...............................  210 mg/dl   10%  ...............................  240 mg/dl  Performed at [address] [address]       LDLCALC   Date/Time Value Ref Range Status   03/03/2021 11:20 AM 72 65 - 130 MG/DL Final   01/29/2020 10:59 AM 78 65 - 130 MG/DL Final   02/18/2019 10:16 AM 94 65 - 130 MG/DL Final      Last I/O:  I/O last 3 completed shifts:  In: 751.1 (6 mL/kg) [P.O.:120; I.V.:81.1 (0.6 mL/kg); IV Piggyback:550]  Out: 5249 (41.9 mL/kg) [Urine:5239 (1.2 mL/kg/hr); Blood:10]  Weight: 125.3 kg     Past Cardiology Tests (Last 3 Years):  EKG:  ECG 12 Lead 10/11/2024 (Preliminary)      ECG 12 Lead 10/10/2024    Echo:  Transthoracic Echo (TTE) Complete 10/11/2024    Ejection Fractions:  EF   Date/Time Value Ref Range Status   10/11/2024 08:53 AM 43 %      Cath:  No results found for this or any previous visit from the past 1095 days.    Stress Test:  No results found for this or any previous visit from the past 1095 days.    Cardiac Imaging:  No results found for this or any previous visit from the past 1095 days.      Inpatient Medications:  Scheduled medications   Medication Dose Route Frequency    aspirin  81 mg oral Daily    escitalopram  10 mg oral Daily    [Held by provider] fluticasone furoate-vilanteroL  1 puff inhalation Daily    furosemide  40 mg intravenous q8h    [Held by provider] furosemide  40 mg oral BID    heparin  (porcine)  7,500 Units subcutaneous q8h [facility]    insulin lispro  0-15 Units subcutaneous Before meals & nightly    ipratropium-albuteroL  3 mL nebulization q4h    levothyroxine  100 mcg oral Daily    [START ON 10/13/2024] losartan  50 mg oral Daily    [Held by provider] metFORMIN  500 mg oral BID    methylPREDNISolone sodium succinate (PF)  40 mg intravenous q6h    metoprolol succinate XL  25 mg oral Daily    morphine  1 mg intravenous Once    morphine  2 mg intravenous Once    nystatin  1 Application Topical BID    perflutren protein A microsphere  0.5 mL intravenous Once in imaging    piperacillin-tazobactam  3.375 g intravenous q6h    simvastatin  40 mg oral Nightly    sulfur hexafluoride microsphr  2 mL intravenous Once in imaging    ticagrelor  90 mg oral BID    vancomycin  1,250 mg intravenous q12h     PRN medications   Medication    acetaminophen    dextrose    dextrose    glucagon    glucagon    ipratropium-albuteroL    lidocaine-epinephrine    morphine    nitroglycerin    nitroglycerin    oxygen    oxygen    polyethylene glycol    vancomycin     Continuous Medications   Medication Dose Last Rate    dexmedeTOMIDine  0.1-1.5 mcg/kg/hr 0.1 mcg/kg/hr (10/12/24 1050)       Physical Exam:  General: Patient is in no acute distress.  HEENT: atraumatic normocephalic.  Neck: is supple jugular venous pressure within normal limits no thyromegaly.  Cardiovascular regular rate and rhythm normal heart sounds no murmurs rubs or gallops.  Lungs: Distant lung sounds abdomen: is soft nontender.  Extremities warm to touch no edema.       Assessment/Plan   1 lateral ST elevation myocardial infarction secondary to thrombus seen on cardiac catheterization distal diagonal 1.  No indication for intervention since the lesion is very distally and the artery diameter is small overall for intervention.  The remainder of her coronaries appears without obstructive coronary disease.  Recommend aspirin Brilinta for 12 months.  High intensity  statin.  The echo showed ejection fraction of 45% with distal lateral apical anterior wall hypokinesis consistent with the territory of distal diagonal 1 which is almost dual system.     2.  Acute on chronic respiratory failure secondary to combination of COPD exacerbation and acute decompensated heart failure diastolic dysfunction.  We will decrease losartan to 50 mg oral daily.  Start on metoprolol succinate 25 mg oral daily.  Continue diuresis.  Will monitor.  May consider Farxiga to help with her heart failure systolic and diastolic dysfunction.      3.  Hypertension.  We will decrease losartan to 50 mg oral daily.  Start metoprolol succinate 25 mg oral daily.      4.  Hyperlipidemia continue high intensity statin.     5.  Morbid obesity.  Peripheral IV 10/10/24 18 G Right Antecubital (Active)   Site Assessment Clean;Dry;Intact 10/12/24 0900   Dressing Status Clean;Dry 10/12/24 0900   Number of days: 2       Peripheral IV 10/11/24 20 G Left Forearm (Active)   Site Assessment Clean;Dry;Intact 10/12/24 0900   Dressing Status Clean;Dry 10/12/24 0900   Number of days: 1       Urethral Catheter (Active)   Site Assessment Clean;Skin intact 10/12/24 1012   Collection Container Standard drainage bag 10/12/24 1012   Securement Method Securing device (Describe) 10/12/24 1012   Reason for Continuing Urinary Catheterization accurate hourly measurement of urine volume in a critically ill patient that cannot be assessed by other volumes and urine collection strategies 10/12/24 1012   Number of days: 2       Code Status:  Full Code      René Kohli MD

## 2024-10-12 NOTE — PROGRESS NOTES
Spiritual Care Visit    Clinical Encounter Type  Visited With: Patient  Routine Visit: Introduction  Continue Visiting: Yes         Values/Beliefs  Spiritual Requests During Hospitalization: Kay was anointed today. She is NPO.    Sacramental Encounters  Sacrament of Sick-Anointing: Anointed                             Taxonomy  Intended Effects: Build relationship of care and support    Stephane Felder

## 2024-10-12 NOTE — PROGRESS NOTES
Vancomycin Dosing by Pharmacy- FOLLOW UP    Kay Pike is a 59 y.o. year old female who Pharmacy has been consulted for vancomycin dosing for pneumonia. Based on the patient's indication and renal status this patient is being dosed based on a goal AUC of 400-600.     Renal function is currently declining.    Current vancomycin dose: 1750 mg given every 12 hours    Estimated vancomycin AUC on current dose: 651 mg/L.hr     Visit Vitals  BP (!) 104/41   Pulse 77   Temp 37.2 °C (99 °F) (Oral)   Resp (!) 28        Lab Results   Component Value Date    CREATININE 0.75 10/12/2024    CREATININE 0.65 10/11/2024    CREATININE 0.61 10/11/2024    CREATININE 0.70 10/10/2024        Patient weight is as follows:   Vitals:    10/12/24 0615   Weight: 125 kg (276 lb 3.8 oz)       Cultures:  No results found for the encounter in last 14 days.       I/O last 3 completed shifts:  In: 751.1 (6 mL/kg) [P.O.:120; I.V.:81.1 (0.6 mL/kg); IV Piggyback:550]  Out: 5249 (41.9 mL/kg) [Urine:5239 (1.2 mL/kg/hr); Blood:10]  Weight: 125.3 kg   I/O during current shift:  No intake/output data recorded.    Temp (24hrs), Av.3 °C (99.1 °F), Min:36.9 °C (98.4 °F), Max:38 °C (100.4 °F)      Assessment/Plan    Above goal AUC. Orders placed for new vancomcyin regimen of 1250 every 12 hours to begin at 1800.    This dosing regimen is predicted by [employer] to result in the following pharmacokinetic parameters:    Loading dose: 2g 10/11 @1538  Regimen: 1250 mg IV every 12 hours.  Start time: 18:00 on 10/12/2024  Exposure target: AUC24 (range)400-600 mg/L.hr   AUR45-59: 468 mg/L.hr  AUC24,ss: 465 mg/L.hr  Probability of AUC24 > 400: 98 %  Ctrough,ss: 11.9 mg/L  Probability of Ctrough,ss > 20: 4 %    The next level will be obtained on 10/13 at 0500. May be obtained sooner if clinically indicated.   Will continue to monitor renal function daily while on vancomycin and order serum creatinine at least every 48 hours if not already ordered.  Follow for  continued vancomycin needs, clinical response, and signs/symptoms of toxicity.       Jonna Callahan, PharmD

## 2024-10-12 NOTE — CARE PLAN
Problem: Pain - Adult  Goal: Verbalizes/displays adequate comfort level or baseline comfort level  10/12/2024 0234 by Maureen Price RN  Outcome: Progressing  10/12/2024 0233 by Maureen Price RN  Outcome: Progressing     Problem: Safety - Adult  Goal: Free from fall injury  10/12/2024 0234 by Maureen Price RN  Outcome: Progressing  10/12/2024 0233 by Maureen Price RN  Outcome: Progressing     Problem: Discharge Planning  Goal: Discharge to home or other facility with appropriate resources  10/12/2024 0234 by Maureen Price RN  Outcome: Progressing  10/12/2024 0233 by Maureen Price RN  Outcome: Progressing     Problem: Chronic Conditions and Co-morbidities  Goal: Patient's chronic conditions and co-morbidity symptoms are monitored and maintained or improved  10/12/2024 0234 by Maureen Price RN  Outcome: Progressing  10/12/2024 0233 by Maureen Price RN  Outcome: Progressing     Problem: Skin  Goal: Decreased wound size/increased tissue granulation at next dressing change  10/12/2024 0234 by Maureen Price RN  Outcome: Progressing  Flowsheets (Taken 10/12/2024 0234)  Decreased wound size/increased tissue granulation at next dressing change:   Promote sleep for wound healing   Protective dressings over bony prominences  10/12/2024 0233 by Maureen Price RN  Outcome: Progressing  Goal: Participates in plan/prevention/treatment measures  10/12/2024 0234 by Maureen Price RN  Outcome: Progressing  Flowsheets (Taken 10/12/2024 0234)  Participates in plan/prevention/treatment measures:   Elevate heels   Discuss with provider PT/OT consult  10/12/2024 0233 by Maureen Price RN  Outcome: Progressing  Goal: Prevent/manage excess moisture  10/12/2024 0234 by Maureen Price RN  Outcome: Progressing  Flowsheets (Taken 10/12/2024 0234)  Prevent/manage excess moisture:   Cleanse incontinence/protect with barrier cream   Moisturize dry skin   Use wicking fabric (obtain order)  10/12/2024 0233 by  Maureen Price RN  Outcome: Progressing  Goal: Prevent/minimize sheer/friction injuries  10/12/2024 0234 by Maureen Price RN  Outcome: Progressing  Flowsheets (Taken 10/12/2024 0234)  Prevent/minimize sheer/friction injuries:   HOB 30 degrees or less   Use pull sheet  10/12/2024 0233 by Maureen Price RN  Outcome: Progressing  Goal: Promote/optimize nutrition  10/12/2024 0234 by Maureen Price RN  Outcome: Progressing  Flowsheets (Taken 10/12/2024 0234)  Promote/optimize nutrition:   Discuss with provider if NPO > 2 days   Monitor/record intake including meals  10/12/2024 0233 by Maureen Price RN  Outcome: Progressing  Goal: Promote skin healing  10/12/2024 0234 by Maureen Price RN  Outcome: Progressing  Flowsheets (Taken 10/12/2024 0234)  Promote skin healing:   Assess skin/pad under line(s)/device(s)   Protective dressings over bony prominences  10/12/2024 0233 by Maureen Price RN  Outcome: Progressing   The patient's goals for the shift include Unable to assess, drowsy    The clinical goals for the shift include wean off bipap as tolerated, remain hemodynamically stable

## 2024-10-12 NOTE — PROGRESS NOTES
Occupational Therapy  Evaluation/Treatment    Patient Name: Kay Pike  MRN: [id number]  : 1965  Today's Date: 10/12/24  Time Calculation  Start Time: 1231  Stop Time: 1300  Time Calculation (min): 29 min       Assessment:  OT Assessment: Pt demonstrates poor endurance and tolerance to activity this date. OT limited evaluation to bed level d/t inc'd respiratory rate during activity. Continue OT to optimize and promote independence with ADLs, endurance and tolerance, strength, OOB assessment, and d/c planning. Recommend d/c to Mod Intensity level. Likely to progress to high intensity level with improved tolerance.  Prognosis: Excellent  Evaluation/Treatment Tolerance: Patient limited by fatigue  Medical Staff Made Aware: Yes  End of Session Communication: Bedside nurse  End of Session Patient Position: Bed, 4 rail up, Alarm on  OT Assessment Results: Decreased ADL status, Decreased endurance, Decreased functional mobility  Prognosis: Excellent  Evaluation/Treatment Tolerance: Patient limited by fatigue  Medical Staff Made Aware: Yes  Strengths: Ability to acquire knowledge  Plan:  Treatment Interventions: ADL retraining, Functional transfer training, Endurance training, Patient/family training, Equipment evaluation/education, Compensatory technique education, Continued evaluation  OT Frequency: 4 times per week  OT Discharge Recommendations: Moderate intensity level of continued care  OT Recommended Transfer Status: Minimal assist, Assist of 1  OT - OK to Discharge: Yes  Treatment Interventions: ADL retraining, Functional transfer training, Endurance training, Patient/family training, Equipment evaluation/education, Compensatory technique education, Continued evaluation    Subjective   Current Problem:  1. Shortness of breath  Transthoracic Echo (TTE) Complete    Transthoracic Echo (TTE) Complete      2. ST elevation        3. STEMI (ST elevation myocardial infarction) (Multi)  Cardiac Catheterization  "Procedure    Cardiac Catheterization Procedure    Transthoracic Echo (TTE) Complete    Transthoracic Echo (TTE) Complete        General:   OT Received On: 10/12/24  General  Reason for Referral: 60yo F PMH DMII, VLADIMIR, HLD, anxiety, asthma and COPD presenting with SOB. In ED, tachynpenic, inc'd WOB, and tachycardic requiring step up to ICU for BiPAP. Now weaning to HFNC.  Referred By: Matt  Past Medical History Relevant to Rehab: DMII, VLADIMIR, HLD, anxiety, asthma and COPD  Family/Caregiver Present: Yes  Caregiver Feedback: Sister and niece  Prior to Session Communication: Bedside nurse  Patient Position Received: Bed, 4 rail up, Alarm on  General Comment: Cleared and agreeable to participate in OT in ICU. (+)HFNC (35L/40%), PIV, tomasa, tele., pulse ox. \"I am going home.\"  Precautions:  Medical Precautions: Fall precautions    Vital Sign (Past 2hrs)        Date/Time Vitals Session Patient Position Pulse Resp SpO2 BP MAP (mmHg)    10/12/24 1200 --  --  81  18  94 %  124/49  71     10/12/24 1231 Pre OT  --  87  27  93 %  124/49  71     10/12/24 1250 During OT  --  93  41  94 %  --  --     10/12/24 1300 Post OT  --  87  34  92 %  124/45  67                         Pain:  Pain Assessment  Pain Assessment: 0-10  0-10 (Numeric) Pain Score: 0 - No pain    Objective   Cognition:  Overall Cognitive Status: Within Functional Limits             Home Living:  Type of Home: House  Lives With: Siblings (Sister and niece)  Home Adaptive Equipment: Walker rolling or standard  Home Layout: Multi-level, Able to live on main level with bedroom/bathroom  Home Access: Stairs to enter with rails  Entrance Stairs-Rails: Both  Entrance Stairs-Number of Steps: 3  Bathroom Shower/Tub: Tub/shower unit  Bathroom Equipment: Grab bars in shower  Home Living Comments: Lives with sister and niece. independent PTA. Works full time from home. Sister provides transportation. Denies falls.    Prior Function:  Level of [place]: Independent with ADLs " and functional transfers, Independent with homemaking with ambulation  Receives Help From: Family  ADL Assistance: Independent  Homemaking Assistance: Independent  Ambulatory Assistance: Independent  Vocational: Full time employment  Hand Dominance: Right    IADL History:  Mode of Transportation: Car    ADL:  Eating Assistance: Independent  Grooming Assistance: Stand by  Grooming Deficit: Wash/dry face, Teeth care  Bathing Assistance: Not performed  UE Dressing Assistance: Not performed  LE Dressing Assistance: Not performed  Toileting Assistance with Device: Not performed    Activity Tolerance:  Endurance: Tolerates less than 10 min exercise with changes in vital signs        Bed Mobility/Transfers: Bed Mobility  Bed Mobility: Yes  Bed Mobility 1  Bed Mobility 1: Long sit  Level of Assistance 1: Contact guard  Bed Mobility Comments 1: Pulling from B bed rail. Decreased tolerance to long sitting. ~10-15 sec    Therapy/Activity: Therapeutic Exercise  Therapeutic Exercise Performed: Yes  Therapeutic Exercise Activity 1: Bed level. B UE elbow flexion/extension and shoulder flexion/neutral against gravity with cues for pacing, pursed lip breathing, and extended rest breaks between sets. 1x10. Monitored RR (inc'd to 41)    Coordination:  Movements are Fluid and Coordinated: Yes     Hand Function:  Hand Function  Gross Grasp: Functional    Extremities: RUE   RUE : Within Functional Limits and LUE   LUE: Within Functional Limits    Outcome Measures: [facility] Daily Activity  Putting on and taking off regular lower body clothing: A lot  Bathing (including washing, rinsing, drying): A lot  Putting on and taking off regular upper body clothing: A little  Toileting, which includes using toilet, bedpan or urinal: A little  Taking care of personal grooming such as brushing teeth: None  Eating Meals: None  Daily Activity - Total Score: 18        Education Documentation  Home Exercise Program, taught by Cesilia Adams OT at  10/12/2024  1:23 PM.  Learner: Patient  Readiness: Acceptance  Method: Explanation, Demonstration  Response: Verbalizes Understanding, Demonstrated Understanding    ADL Training, taught by Cesilia Adams OT at 10/12/2024  1:23 PM.  Learner: Patient  Readiness: Acceptance  Method: Explanation, Demonstration  Response: Verbalizes Understanding, Demonstrated Understanding    Education Comments  No comments found.          Goals:  Encounter Problems       Encounter Problems (Active)       Bathing       LTG - Patient will utilize adaptive techniques to bathe body Min A       Start:  10/12/24    Expected End:  11/09/24               Dressing Upper Extremities       LTG - Patient will complete upper body dressing Mod I       Start:  10/12/24    Expected End:  11/09/24               Dressings Lower Extremities       LTG - Patient will dress lower body Min A       Start:  10/12/24    Expected End:  11/09/24               Functional Mobility       Pt will demonstrate sitting/standing tolerance x 4 min, without significant inc in RR to complete ADL routine.        Start:  10/12/24    Expected End:  11/09/24               Toileting       LTG - Patient will complete daily toileting tasks Min A       Start:  10/12/24    Expected End:  11/09/24

## 2024-10-12 NOTE — SIGNIFICANT EVENT
Patient to room from cath lab. Cath lab RN did not call ahead with report. Patient arrived on BiPAP breathing 43-54/min. Patient extremely anxious and ripping off BiPAP mask. Explained to patient that the mask is needed at this time and provided support in an attempt to calm patient until seen by provider. Notified KODY Santos PA-C of this.   2130- PA-C at bedside and entering orders.

## 2024-10-12 NOTE — CARE PLAN
The patient's goals for the shift include Unable to assess, drowsy    The clinical goals for the shift include pt to remain hemodynamically stable    Over the shift, the patient did not make progress toward the following goals. Barriers to progression include pt still on high flow oxygen. Recommendations to address these barriers include monitor vitals and labs.

## 2024-10-12 NOTE — PROGRESS NOTES
Physical Therapy                 Therapy Communication Note    Patient Name: Kay Pike  MRN: [id number]  Department: [address] ICU  Room: 11/11-A  Today's Date: 10/12/2024     Discipline: Physical Therapy    Missed Visit Reason: Missed Visit Reason: Cancel, Other (Comment)    Missed Time: Cancel/Attempt x 2    Comment: PT consult received, chart reviewed.  Pt. Continues to be on Bi-Pap.  Per nursing, awaiting weaning trial.  Intermittent bouts of hypotension.   PT not appropriate at this time.   Will re-attempt as able.

## 2024-10-12 NOTE — PROGRESS NOTES
[facility] Pulmonary and Critical Care Medicine   Progress Note        Subjective   On bipap all night.   Tolerating better this AM    Scheduled Medications:   aspirin, 81 mg, oral, Daily  escitalopram, 10 mg, oral, Daily  [Held by provider] fluticasone furoate-vilanteroL, 1 puff, inhalation, Daily  furosemide, 40 mg, intravenous, q8h  [Held by provider] furosemide, 40 mg, oral, BID  heparin (porcine), 7,500 Units, subcutaneous, q8h PORTIA  insulin lispro, 0-15 Units, subcutaneous, q4h  ipratropium-albuteroL, 3 mL, nebulization, q4h  levothyroxine, 100 mcg, oral, Daily  losartan, 50 mg, oral, BID  [Held by provider] metFORMIN, 500 mg, oral, BID  methylPREDNISolone sodium succinate (PF), 40 mg, intravenous, q6h  metoprolol, 5 mg, intravenous, q6h  morphine, 1 mg, intravenous, Once  morphine, 2 mg, intravenous, Once  nystatin, 1 Application, Topical, BID  perflutren protein A microsphere, 0.5 mL, intravenous, Once in imaging  piperacillin-tazobactam, 3.375 g, intravenous, q6h  simvastatin, 40 mg, oral, Nightly  sulfur hexafluoride microsphr, 2 mL, intravenous, Once in imaging  ticagrelor, 90 mg, oral, BID  vancomycin, 1,250 mg, intravenous, q12h         Continuous Medications:   dexmedeTOMIDine, 0.1-1.5 mcg/kg/hr, Last Rate: 0.1 mcg/kg/hr (10/12/24 0235)         PRN Medications:   PRN medications: acetaminophen, dextrose, dextrose, glucagon, glucagon, ipratropium-albuteroL, lidocaine-epinephrine, morphine, nitroglycerin, nitroglycerin, oxygen, oxygen, polyethylene glycol, vancomycin      Objective   Vitals:  Most Recent:  Vitals:    10/12/24 0700   BP: (!) 104/41   Pulse: 77   Resp: (!) 28   Temp:    SpO2: 97%       24hr Min/Max:  Temp  Min: 37 °C (98.6 °F)  Max: 38 °C (100.4 °F)  Pulse  Min: 74  Max: 96  BP  Min: 104/41  Max: 147/48  Resp  Min: 22  Max: 39  SpO2  Min: 92 %  Max: 98 %    LDA:   Urethral Catheter (Active)   Placement Date: 10/10/24   Hand Hygiene Completed: Yes  Urine Returned: Yes   Number of  days: 1         Vent settings:  FiO2 (%):  [40 %] 40 %  S RR:  [18] 18    Hemodynamic parameters for last 24 hours:         Intake/Output Summary (Last 24 hours) at 10/12/2024 0806  Last data filed at 10/12/2024 0618  Gross per 24 hour   Intake 751.08 ml   Output 2711 ml   Net -1959.92 ml         Physical exam:    Physical Exam  Vitals reviewed.   Constitutional:       Appearance: Normal appearance.   HENT:      Head: Normocephalic and atraumatic.      Mouth/Throat:      Mouth: Mucous membranes are moist.   Eyes:      Extraocular Movements: Extraocular movements intact.      Pupils: Pupils are equal, round, and reactive to light.   Cardiovascular:      Rate and Rhythm: Normal rate and regular rhythm.   Pulmonary:      Effort: Pulmonary effort is normal.      Breath sounds: Normal breath sounds and air entry.   Abdominal:      General: Abdomen is flat. Bowel sounds are normal.      Palpations: Abdomen is soft.   Musculoskeletal:         General: Normal range of motion.      Cervical back: Normal range of motion and neck supple.   Skin:     General: Skin is warm and dry.   Neurological:      General: No focal deficit present.      Mental Status: She is alert and oriented to person, place, and time. Mental status is at baseline.          Lab/Radiology/Diagnostic Review:    All labs and Imaging have been personally reviewed.       Assessment/Plan       Neuro/Psych/Pain Ctrl/Sedation:  Anxiety  - Pain Management: tylenol  - CAM ICU  - Cont home lexapro  - Improved with the precedex    Respiratory/ENT:  Acute hypoxic resp failure 2/2 flash pulmonary edema  COPD  Asthma  - Maintain SPO2 >92%  - Continuous pulse ox monitoring   - Pulm hygiene  - Duonebs q4h  - Cont Bipap   - Trial of HFNC  - Decrease steroids to q 12H     Cardiovascular:  STEMI - no occlusions on heart cath  CHF exacerbation  HTN  HLD  - Continuous cardiac monitoring per ICU protocol  - Maintain MAPS >65  - Daily EKGs prm  - Heart cath 10/10: lateral ST  elevation myocardial infarction secondary to thrombus seen on cardiac catheterization distal diagonal. No indication for intervention since the lesion is very distally and there is ZAK I flow seen in the artery which is small overall for intervention. The remainder of her coronaries appears without obstructive coronary disease. Recommend aspirin Brilinta. High intensity statin.   - Cont home losartan, statin, lasix and spirinolactone, ASA  - continue brillanta  - precedex  - hepairn sc     GI:  Morbid obesity  - NPO until off bipap  - BR with miralax prn     Renal/Volume Status (Intra & Extravascular):  - Maintain randolph catheter  - Maintain urine output 0.5-1.0cc/kg/hr  - Monitor I/O's  - Replete electrolytes to maintain K >4.0 and Mg >2.0  - Daily BMP, Mg  - Cr: 0.75  - Lasix 40mg IV q8    Endocrine  DMII  Hypothyroidism  - SSI q4hrs while NPO  - Cont home synthroid   - Monitor for hyper/hypoglycemia      Infectious Disease:  No fevers  - Monitor SIRS criteria  - WBC: 16.7  - ? Bilateral lower extremity cellulitis  - continue abx for now     Heme/Onc:  - Monitor for s/sx of anemia such as bleeding and bruising   - Transfuse if Hgb <7.0   - Daily CBC  - Hgb: 15.6     MSK:  - Padded pressure points   - PT/OT     Skin  - ICU skin protocol     Ethics/Code Status:  Full Code     :  DVT Prophylaxis: SQH  GI Prophylaxis: None  Bowel Regimen: Miralax prn  Diet: NPO  CVC: None  [place]: None  Randolph: Yes  Restraints: None  Dispo: ICU       I have personally spent 50 minutes of critical care time, exclusive of time spent on any procedures, in evaluation and management of this critically ill patient’s condition mentioned above in the assessment and plan.     Uriah Gutierrez MD  Pulmonary & Critical Care Attending   [id number]    Please excuse any typographical or unwanted errors within this documentation as voice recognition software was used to dictate this note.

## 2024-10-13 VITALS
OXYGEN SATURATION: 92 % | BODY MASS INDEX: 49.94 KG/M2 | WEIGHT: 271.39 LBS | HEIGHT: 62 IN | HEART RATE: 93 BPM | RESPIRATION RATE: 33 BRPM | SYSTOLIC BLOOD PRESSURE: 129 MMHG | TEMPERATURE: 96.8 F | DIASTOLIC BLOOD PRESSURE: 56 MMHG

## 2024-10-13 LAB
ALBUMIN SERPL BCP-MCNC: 3.1 G/DL (ref 3.4–5)
ALP SERPL-CCNC: 78 U/L (ref 33–110)
ALT SERPL W P-5'-P-CCNC: 34 U/L (ref 7–45)
ANION GAP SERPL CALCULATED.3IONS-SCNC: 9 MMOL/L (ref 10–20)
AST SERPL W P-5'-P-CCNC: 32 U/L (ref 9–39)
BASOPHILS # BLD AUTO: 0.03 X10*3/UL (ref 0–0.1)
BASOPHILS NFR BLD AUTO: 0.2 %
BILIRUB SERPL-MCNC: 0.6 MG/DL (ref 0–1.2)
BUN SERPL-MCNC: 30 MG/DL (ref 6–23)
CALCIUM SERPL-MCNC: 8.5 MG/DL (ref 8.6–10.3)
CHLORIDE SERPL-SCNC: 99 MMOL/L (ref 98–107)
CO2 SERPL-SCNC: 35 MMOL/L (ref 21–32)
CREAT SERPL-MCNC: 0.73 MG/DL (ref 0.5–1.05)
EGFRCR SERPLBLD CKD-EPI 2021: >90 ML/MIN/1.73M*2
EOSINOPHIL # BLD AUTO: 0 X10*3/UL (ref 0–0.7)
EOSINOPHIL NFR BLD AUTO: 0 %
ERYTHROCYTE [DISTWIDTH] IN BLOOD BY AUTOMATED COUNT: 18.2 % (ref 11.5–14.5)
GLUCOSE BLD MANUAL STRIP-MCNC: 148 MG/DL (ref 74–99)
GLUCOSE BLD MANUAL STRIP-MCNC: 174 MG/DL (ref 74–99)
GLUCOSE BLD MANUAL STRIP-MCNC: 180 MG/DL (ref 74–99)
GLUCOSE BLD MANUAL STRIP-MCNC: 182 MG/DL (ref 74–99)
GLUCOSE SERPL-MCNC: 169 MG/DL (ref 74–99)
HCT VFR BLD AUTO: 40.7 % (ref 36–46)
HGB BLD-MCNC: 13 G/DL (ref 12–16)
IMM GRANULOCYTES # BLD AUTO: 0.33 X10*3/UL (ref 0–0.7)
IMM GRANULOCYTES NFR BLD AUTO: 2.2 % (ref 0–0.9)
LYMPHOCYTES # BLD AUTO: 0.57 X10*3/UL (ref 1.2–4.8)
LYMPHOCYTES NFR BLD AUTO: 3.7 %
MAGNESIUM SERPL-MCNC: 2.2 MG/DL (ref 1.6–2.4)
MCH RBC QN AUTO: 26 PG (ref 26–34)
MCHC RBC AUTO-ENTMCNC: 31.9 G/DL (ref 32–36)
MCV RBC AUTO: 81 FL (ref 80–100)
MONOCYTES # BLD AUTO: 0.38 X10*3/UL (ref 0.1–1)
MONOCYTES NFR BLD AUTO: 2.5 %
NEUTROPHILS # BLD AUTO: 13.94 X10*3/UL (ref 1.2–7.7)
NEUTROPHILS NFR BLD AUTO: 91.4 %
NRBC BLD-RTO: 0 /100 WBCS (ref 0–0)
PHOSPHATE SERPL-MCNC: 3.9 MG/DL (ref 2.5–4.9)
PLATELET # BLD AUTO: 156 X10*3/UL (ref 150–450)
POTASSIUM SERPL-SCNC: 4 MMOL/L (ref 3.5–5.3)
PROT SERPL-MCNC: 6.1 G/DL (ref 6.4–8.2)
RBC # BLD AUTO: 5 X10*6/UL (ref 4–5.2)
SODIUM SERPL-SCNC: 139 MMOL/L (ref 136–145)
VANCOMYCIN SERPL-MCNC: 17.7 UG/ML (ref 5–20)
WBC # BLD AUTO: 15.3 X10*3/UL (ref 4.4–11.3)

## 2024-10-13 PROCEDURE — 94640 AIRWAY INHALATION TREATMENT: CPT

## 2024-10-13 PROCEDURE — 84075 ASSAY ALKALINE PHOSPHATASE: CPT

## 2024-10-13 PROCEDURE — 80202 ASSAY OF VANCOMYCIN: CPT | Performed by: INTERNAL MEDICINE

## 2024-10-13 PROCEDURE — 83735 ASSAY OF MAGNESIUM: CPT

## 2024-10-13 PROCEDURE — 94660 CPAP INITIATION&MGMT: CPT

## 2024-10-13 PROCEDURE — 2500000004 HC RX 250 GENERAL PHARMACY W/ HCPCS (ALT 636 FOR OP/ED): Performed by: INTERNAL MEDICINE

## 2024-10-13 PROCEDURE — 9420000001 HC RT PATIENT EDUCATION 5 MIN

## 2024-10-13 PROCEDURE — 2500000002 HC RX 250 W HCPCS SELF ADMINISTERED DRUGS (ALT 637 FOR MEDICARE OP, ALT 636 FOR OP/ED): Performed by: INTERNAL MEDICINE

## 2024-10-13 PROCEDURE — 2500000001 HC RX 250 WO HCPCS SELF ADMINISTERED DRUGS (ALT 637 FOR MEDICARE OP): Performed by: INTERNAL MEDICINE

## 2024-10-13 PROCEDURE — 2500000001 HC RX 250 WO HCPCS SELF ADMINISTERED DRUGS (ALT 637 FOR MEDICARE OP)

## 2024-10-13 PROCEDURE — 82947 ASSAY GLUCOSE BLOOD QUANT: CPT

## 2024-10-13 PROCEDURE — 2500000002 HC RX 250 W HCPCS SELF ADMINISTERED DRUGS (ALT 637 FOR MEDICARE OP, ALT 636 FOR OP/ED)

## 2024-10-13 PROCEDURE — 84100 ASSAY OF PHOSPHORUS: CPT

## 2024-10-13 PROCEDURE — 85025 COMPLETE CBC W/AUTO DIFF WBC: CPT

## 2024-10-13 PROCEDURE — 36415 COLL VENOUS BLD VENIPUNCTURE: CPT

## 2024-10-13 PROCEDURE — 99291 CRITICAL CARE FIRST HOUR: CPT | Performed by: INTERNAL MEDICINE

## 2024-10-13 PROCEDURE — 2020000001 HC ICU ROOM DAILY

## 2024-10-13 RX ORDER — FUROSEMIDE 10 MG/ML
80 INJECTION INTRAMUSCULAR; INTRAVENOUS DAILY
Status: DISCONTINUED | OUTPATIENT
Start: 2024-10-14 | End: 2024-10-17

## 2024-10-13 RX ORDER — DAPAGLIFLOZIN 10 MG/1
10 TABLET, FILM COATED ORAL DAILY
Status: DISCONTINUED | OUTPATIENT
Start: 2024-10-13 | End: 2024-10-24 | Stop reason: HOSPADM

## 2024-10-13 RX ADMIN — IPRATROPIUM BROMIDE AND ALBUTEROL SULFATE 3 ML: 2.5; .5 SOLUTION RESPIRATORY (INHALATION) at 15:05

## 2024-10-13 RX ADMIN — METHYLPREDNISOLONE SODIUM SUCCINATE 40 MG: 40 INJECTION, POWDER, FOR SOLUTION INTRAMUSCULAR; INTRAVENOUS at 12:01

## 2024-10-13 RX ADMIN — HEPARIN SODIUM 7500 UNITS: 5000 INJECTION, SOLUTION INTRAVENOUS; SUBCUTANEOUS at 22:03

## 2024-10-13 RX ADMIN — HEPARIN SODIUM 7500 UNITS: 5000 INJECTION, SOLUTION INTRAVENOUS; SUBCUTANEOUS at 05:57

## 2024-10-13 RX ADMIN — SIMVASTATIN 40 MG: 40 TABLET, FILM COATED ORAL at 20:42

## 2024-10-13 RX ADMIN — IPRATROPIUM BROMIDE AND ALBUTEROL SULFATE 3 ML: 2.5; .5 SOLUTION RESPIRATORY (INHALATION) at 07:21

## 2024-10-13 RX ADMIN — PIPERACILLIN SODIUM AND TAZOBACTAM SODIUM 3.38 G: 3; .375 INJECTION, SOLUTION INTRAVENOUS at 09:19

## 2024-10-13 RX ADMIN — HEPARIN SODIUM 7500 UNITS: 5000 INJECTION, SOLUTION INTRAVENOUS; SUBCUTANEOUS at 14:08

## 2024-10-13 RX ADMIN — VANCOMYCIN 1250 MG: 1.75 INJECTION, SOLUTION INTRAVENOUS at 05:57

## 2024-10-13 RX ADMIN — NYSTATIN 1 APPLICATION: 100000 POWDER TOPICAL at 10:25

## 2024-10-13 RX ADMIN — VANCOMYCIN 1250 MG: 1.75 INJECTION, SOLUTION INTRAVENOUS at 17:22

## 2024-10-13 RX ADMIN — IPRATROPIUM BROMIDE AND ALBUTEROL SULFATE 3 ML: 2.5; .5 SOLUTION RESPIRATORY (INHALATION) at 11:13

## 2024-10-13 RX ADMIN — NYSTATIN 1 APPLICATION: 100000 POWDER TOPICAL at 20:43

## 2024-10-13 RX ADMIN — ESCITALOPRAM OXALATE 10 MG: 10 TABLET ORAL at 10:17

## 2024-10-13 RX ADMIN — TICAGRELOR 90 MG: 90 TABLET ORAL at 20:42

## 2024-10-13 RX ADMIN — PIPERACILLIN SODIUM AND TAZOBACTAM SODIUM 3.38 G: 3; .375 INJECTION, SOLUTION INTRAVENOUS at 03:11

## 2024-10-13 RX ADMIN — LEVOTHYROXINE SODIUM 100 MCG: 0.1 TABLET ORAL at 05:57

## 2024-10-13 RX ADMIN — METHYLPREDNISOLONE SODIUM SUCCINATE 40 MG: 40 INJECTION, POWDER, FOR SOLUTION INTRAMUSCULAR; INTRAVENOUS at 23:44

## 2024-10-13 RX ADMIN — DAPAGLIFLOZIN 10 MG: 10 TABLET, FILM COATED ORAL at 11:06

## 2024-10-13 RX ADMIN — INSULIN LISPRO 3 UNITS: 100 INJECTION, SOLUTION INTRAVENOUS; SUBCUTANEOUS at 20:59

## 2024-10-13 RX ADMIN — FUROSEMIDE 40 MG: 10 INJECTION, SOLUTION INTRAMUSCULAR; INTRAVENOUS at 03:11

## 2024-10-13 RX ADMIN — TICAGRELOR 90 MG: 90 TABLET ORAL at 10:16

## 2024-10-13 RX ADMIN — IPRATROPIUM BROMIDE AND ALBUTEROL SULFATE 3 ML: 2.5; .5 SOLUTION RESPIRATORY (INHALATION) at 02:15

## 2024-10-13 RX ADMIN — LOSARTAN POTASSIUM 50 MG: 50 TABLET, FILM COATED ORAL at 10:17

## 2024-10-13 RX ADMIN — PIPERACILLIN SODIUM AND TAZOBACTAM SODIUM 3.38 G: 3; .375 INJECTION, SOLUTION INTRAVENOUS at 14:42

## 2024-10-13 RX ADMIN — IPRATROPIUM BROMIDE AND ALBUTEROL SULFATE 3 ML: 2.5; .5 SOLUTION RESPIRATORY (INHALATION) at 17:18

## 2024-10-13 RX ADMIN — METHYLPREDNISOLONE SODIUM SUCCINATE 40 MG: 40 INJECTION, POWDER, FOR SOLUTION INTRAMUSCULAR; INTRAVENOUS at 05:57

## 2024-10-13 RX ADMIN — METHYLPREDNISOLONE SODIUM SUCCINATE 40 MG: 40 INJECTION, POWDER, FOR SOLUTION INTRAMUSCULAR; INTRAVENOUS at 00:28

## 2024-10-13 RX ADMIN — INSULIN LISPRO 3 UNITS: 100 INJECTION, SOLUTION INTRAVENOUS; SUBCUTANEOUS at 10:48

## 2024-10-13 RX ADMIN — PIPERACILLIN SODIUM AND TAZOBACTAM SODIUM 3.38 G: 3; .375 INJECTION, SOLUTION INTRAVENOUS at 20:42

## 2024-10-13 RX ADMIN — HYDROXYZINE HYDROCHLORIDE 25 MG: 25 TABLET, FILM COATED ORAL at 20:53

## 2024-10-13 RX ADMIN — METOPROLOL SUCCINATE 25 MG: 25 TABLET, FILM COATED, EXTENDED RELEASE ORAL at 10:17

## 2024-10-13 RX ADMIN — ASPIRIN 81 MG: 81 TABLET, COATED ORAL at 10:17

## 2024-10-13 ASSESSMENT — PAIN SCALES - GENERAL
PAINLEVEL_OUTOF10: 0 - NO PAIN

## 2024-10-13 ASSESSMENT — PAIN - FUNCTIONAL ASSESSMENT
PAIN_FUNCTIONAL_ASSESSMENT: 0-10

## 2024-10-13 NOTE — CARE PLAN
The patient's goals for the shift include Unable to assess, drowsy    The clinical goals for the shift include Patient will remain hemodynamically stable      Problem: Pain - Adult  Goal: Verbalizes/displays adequate comfort level or baseline comfort level  Outcome: Progressing  Flowsheets (Taken 10/13/2024 0332)  Verbalizes/displays adequate comfort level or baseline comfort level:   Encourage patient to monitor pain and request assistance   Assess pain using appropriate pain scale   Administer analgesics based on type and severity of pain and evaluate response   Implement non-pharmacological measures as appropriate and evaluate response   Consider cultural and social influences on pain and pain management   Notify Licensed Independent Practitioner if interventions unsuccessful or patient reports new pain     Problem: Safety - Adult  Goal: Free from fall injury  Outcome: Progressing     Problem: Discharge Planning  Goal: Discharge to home or other facility with appropriate resources  Outcome: Progressing  Flowsheets (Taken 10/13/2024 0332)  Discharge to home or other facility with appropriate resources:   Identify barriers to discharge with patient and caregiver   Arrange for needed discharge resources and transportation as appropriate   Identify discharge learning needs (meds, wound care, etc)   Arrange for interpreters to assist at discharge as needed   Refer to discharge planning if patient needs post-hospital services based on physician order or complex needs related to functional status, cognitive ability or social support system     Problem: Chronic Conditions and Co-morbidities  Goal: Patient's chronic conditions and co-morbidity symptoms are monitored and maintained or improved  Outcome: Progressing  Flowsheets (Taken 10/13/2024 0332)  Care Plan - Patient's Chronic Conditions and Co-Morbidity Symptoms are Monitored and Maintained or Improved:   Monitor and assess patient's chronic conditions and comorbid  symptoms for stability, deterioration, or improvement   Collaborate with multidisciplinary team to address chronic and comorbid conditions and prevent exacerbation or deterioration   Update acute care plan with appropriate goals if chronic or comorbid symptoms are exacerbated and prevent overall improvement and discharge     Problem: Skin  Goal: Decreased wound size/increased tissue granulation at next dressing change  Outcome: Progressing  Flowsheets (Taken 10/12/2024 1255 by Anjana Pitts, RN)  Decreased wound size/increased tissue granulation at next dressing change:   Promote sleep for wound healing   Protective dressings over bony prominences  Goal: Participates in plan/prevention/treatment measures  Outcome: Progressing  Flowsheets (Taken 10/12/2024 1255 by Anjana Pitts RN)  Participates in plan/prevention/treatment measures:   Elevate heels   Increase activity/out of bed for meals  Goal: Prevent/manage excess moisture  Outcome: Progressing  Flowsheets (Taken 10/12/2024 1255 by Anjana Pitts RN)  Prevent/manage excess moisture:   Moisturize dry skin   Cleanse incontinence/protect with barrier cream  Goal: Prevent/minimize sheer/friction injuries  Outcome: Progressing  Flowsheets (Taken 10/12/2024 1255 by Anjana Pitts RN)  Prevent/minimize sheer/friction injuries:   Use pull sheet   Turn/reposition every 2 hours/use positioning/transfer devices   HOB 30 degrees or less  Goal: Promote/optimize nutrition  Outcome: Progressing  Flowsheets (Taken 10/12/2024 1255 by Anjana Pitts, RN)  Promote/optimize nutrition:   Assist with feeding   Monitor/record intake including meals  Goal: Promote skin healing  Outcome: Progressing  Flowsheets (Taken 10/12/2024 1255 by Anjana Pitts RN)  Promote skin healing:   Turn/reposition every 2 hours/use positioning/transfer devices   Protective dressings over bony prominences     Problem: Respiratory  Goal: Clear secretions with interventions this  shift  Outcome: Progressing  Flowsheets (Taken 10/13/2024 0332)  Clear secretions with interventions this shift: Suctioning  Goal: Minimize anxiety/maximize coping throughout shift  Outcome: Progressing  Flowsheets (Taken 10/13/2024 0332)  Minimize anxiety/maximize coping throughout shift: Monitor pain/anxiety level  Goal: Minimal/no exertional discomfort or dyspnea this shift  Outcome: Progressing  Goal: No signs of respiratory distress (eg. Use of accessory muscles. Peds grunting)  Outcome: Progressing  Goal: Patent airway maintained this shift  Outcome: Progressing  Goal: Tolerate mechanical ventilation evidenced by VS/agitation level this shift  Outcome: Progressing  Goal: Tolerate pulmonary toileting this shift  Outcome: Progressing  Goal: Verbalize decreased shortness of breath this shift  Outcome: Progressing  Goal: Wean oxygen to maintain O2 saturation per order/standard this shift  Outcome: Progressing  Goal: Increase self care and/or family involvement in next 24 hours  Outcome: Progressing     Problem: Bathing  Goal: LTG - Patient will utilize adaptive techniques to bathe body Min A  Outcome: Progressing     Problem: Dressings Lower Extremities  Goal: LTG - Patient will dress lower body Min A  Outcome: Progressing     Problem: Dressing Upper Extremities  Goal: LTG - Patient will complete upper body dressing Mod I  Outcome: Progressing     Problem: Toileting  Goal: LTG - Patient will complete daily toileting tasks Min A  Outcome: Progressing

## 2024-10-13 NOTE — CARE PLAN
The patient's goals for the shift include Unable to assess, drowsy    The clinical goals for the shift include Pt to remain hemodynamically stable with decrease in o2 demand    Over the shift, the patient did not make progress toward the following goals. Barriers to progression include pt still on high flow oxygen. Recommendations to address these barriers include monitor vitals and decrease oxygen as pt tolerates.

## 2024-10-13 NOTE — PROGRESS NOTES
Subjective Data:  Patient is feeling better.  Denies having any chest pain.  Diuresing well.  No shortness of breath.  Telemetry reviewed no events.    Overnight Events:    Reviewed telemetry overnight no events.     Objective Data:  Last Recorded Vitals:  Vitals:    10/13/24 0600 10/13/24 0603 10/13/24 0700 10/13/24 0800   BP: (!) 114/45 (!) 114/45 122/53 128/54   Pulse: 74 76 80 85   Resp: 24 (!) 28 26 (!) 29   Temp:    36.8 °C (98.2 °F)   TempSrc:    Temporal   SpO2: 94% 94% 95% 95%   Weight:  123 kg (271 lb 6.2 oz)     Height:           Last Labs:  CBC - 10/13/2024:  4:52 AM  15.3 13.0 156    40.7      CMP - 10/13/2024:  4:52 AM  8.5 6.1 32 --- 0.6   3.9 3.1 34 78      PTT - 10/10/2024:  8:00 PM  1.1   11.7 27.6     TROPHS   Date/Time Value Ref Range Status   10/11/2024 06:18 PM 33,613 0 - 13 ng/L Final   10/10/2024 07:59 PM 24 0 - 13 ng/L Final   10/10/2024 07:10 PM 24 0 - 13 ng/L Final     BNP   Date/Time Value Ref Range Status   10/10/2024 07:10  0 - 99 pg/mL Final     HGBA1C   Date/Time Value Ref Range Status   08/02/2024 10:59 AM 6.9 4.2 - 6.5 % Final   12/23/2020 12:29 PM 6.8 4.0 - 6.0 % Final     Comment:     Hemoglobin A1C levels are related to mean blood glucose during the   preceding 2-3 months. The relationship table below may be used as a   general guide. Each 1% increase in HGB A1C is a reflection of an   increase in mean glucose of approximately 30 mg/dl.   Reference: Diabetes Care, volume 29, supplement 1 Jan. 2006                        HGB A1C ................. Approx. Mean Glucose   _______________________________________________   6%   ...............................  120 mg/dl   7%   ...............................  150 mg/dl   8%   ...............................  180 mg/dl   9%   ...............................  210 mg/dl   10%  ...............................  240 mg/dl  Performed at [place] [address] [address] [address]     03/14/2018 11:33 AM 7.3 4.0 - 6.0 % Final      Comment:     Hemoglobin A1C levels are related to mean blood glucose during the   preceding 2-3 months. The relationship table below may be used as a   general guide. Each 1% increase in HGB A1C is a reflection of an   increase in mean glucose of approximately 30 mg/dl.   Reference: Diabetes Care, volume 29, supplement 1 Jan. 2006                        HGB A1C ................. Approx. Mean Glucose   _______________________________________________   6%   ...............................  120 mg/dl   7%   ...............................  150 mg/dl   8%   ...............................  180 mg/dl   9%   ...............................  210 mg/dl   10%  ...............................  240 mg/dl  Performed at [address] [address]       LDLCALC   Date/Time Value Ref Range Status   03/03/2021 11:20 AM 72 65 - 130 MG/DL Final   01/29/2020 10:59 AM 78 65 - 130 MG/DL Final   02/18/2019 10:16 AM 94 65 - 130 MG/DL Final      Last I/O:  I/O last 3 completed shifts:  In: 1486.7 (12.1 mL/kg) [P.O.:480; I.V.:106.7 (0.9 mL/kg); IV Piggyback:900]  Out: 4480 (36.4 mL/kg) [Urine:4480 (1 mL/kg/hr)]  Weight: 123.1 kg     Past Cardiology Tests (Last 3 Years):  EKG:  ECG 12 Lead 10/11/2024 (Preliminary)      ECG 12 Lead 10/10/2024    Echo:  Transthoracic Echo (TTE) Complete 10/11/2024    Ejection Fractions:  EF   Date/Time Value Ref Range Status   10/11/2024 08:53 AM 43 %      Cath:  No results found for this or any previous visit from the past 1095 days.    Stress Test:  No results found for this or any previous visit from the past 1095 days.    Cardiac Imaging:  No results found for this or any previous visit from the past 1095 days.      Inpatient Medications:  Scheduled medications   Medication Dose Route Frequency    aspirin  81 mg oral Daily    escitalopram  10 mg oral Daily    [Held by provider] fluticasone furoate-vilanteroL  1 puff inhalation Daily    furosemide  40 mg intravenous q8h    [Held by  provider] furosemide  40 mg oral BID    heparin (porcine)  7,500 Units subcutaneous q8h PORTIA    insulin lispro  0-15 Units subcutaneous Before meals & nightly    ipratropium-albuteroL  3 mL nebulization q4h    levothyroxine  100 mcg oral Daily    losartan  50 mg oral Daily    [Held by provider] metFORMIN  500 mg oral BID    methylPREDNISolone sodium succinate (PF)  40 mg intravenous q6h    metoprolol succinate XL  25 mg oral Daily    morphine  1 mg intravenous Once    morphine  2 mg intravenous Once    nystatin  1 Application Topical BID    perflutren protein A microsphere  0.5 mL intravenous Once in imaging    piperacillin-tazobactam  3.375 g intravenous q6h    simvastatin  40 mg oral Nightly    sulfur hexafluoride microsphr  2 mL intravenous Once in imaging    ticagrelor  90 mg oral BID    vancomycin  1,250 mg intravenous q12h     PRN medications   Medication    acetaminophen    dextrose    dextrose    glucagon    glucagon    hydrOXYzine HCL    ipratropium-albuteroL    lidocaine-epinephrine    morphine    nitroglycerin    nitroglycerin    oxygen    oxygen    polyethylene glycol    vancomycin     Continuous Medications   Medication Dose Last Rate    dexmedeTOMIDine  0.1-1.5 mcg/kg/hr Stopped (10/13/24 0520)       Physical Exam:  General: Patient is in no acute distress.  HEENT: atraumatic normocephalic.  Neck: is supple jugular venous pressure within normal limits no thyromegaly.  Cardiovascular regular rate and rhythm normal heart sounds no murmurs rubs or gallops.  Lungs: Distant lung sounds abdomen: is soft nontender.  Extremities warm to touch no edema.           Assessment/Plan  1 lateral ST elevation myocardial infarction secondary to thrombus seen on cardiac catheterization distal diagonal 1.  No indication for intervention since the lesion is very distally and the artery diameter is small overall for intervention.  The remainder of her coronaries appears without obstructive coronary disease.  Recommend  aspirin Brilinta for 12 months.  High intensity statin.  The echo showed ejection fraction of 45% with distal lateral apical anterior wall hypokinesis consistent with the territory of distal diagonal 1 which is almost dual system.     2.  Acute on chronic respiratory failure secondary to combination of COPD exacerbation and acute decompensated heart failure diastolic dysfunction.  We will decrease losartan to 50 mg oral daily.  Start on metoprolol succinate 25 mg oral daily.  Continue diuresis.  Will monitor.  Will start Farxiga 10 mg oral daily.      3.  Hypertension.  We will decrease losartan to 50 mg oral daily.  Start metoprolol succinate 25 mg oral daily.       4.  Hyperlipidemia continue high intensity statin.     5.  Morbid obesity.    Patient stable from my standpoint.  Recommend her to be on furosemide 80 mg oral daily, Farxiga 10 mg oral daily, aspirin 81 mg oral daily, Brilinta 90 mg oral twice daily, atorvastatin 80 mg oral daily, metoprolol succinate 50 mg oral daily, losartan 50 mg oral daily.  Follow-up in my office in 1 to 2 weeks.  Will sign off please call with any question  Peripheral IV 10/10/24 18 G Right Antecubital (Active)   Site Assessment Clean;Dry;Intact 10/13/24 0850   Dressing Status Clean;Dry;Occlusive 10/13/24 0850   Number of days: 3       Peripheral IV 10/11/24 20 G Left Forearm (Active)   Site Assessment Clean;Dry;Intact 10/13/24 0850   Dressing Status Clean;Dry;Occlusive 10/13/24 0850   Number of days: 2       Urethral Catheter (Active)   Site Assessment Clean;Skin intact 10/13/24 0850   Number of days: 3       Code Status:  Full Code      René Kohli MD

## 2024-10-13 NOTE — PROGRESS NOTES
Vancomycin Dosing by Pharmacy- FOLLOW UP    Kay Pike is a 59 y.o. year old female who Pharmacy has been consulted for vancomycin dosing for pneumonia. Based on the patient's indication and renal status this patient is being dosed based on a goal AUC of 400-600.     Renal function is currently stable.    Current vancomycin dose: 1250 mg given every 12 hours    Estimated vancomycin AUC on current dose: 547 mg/L.hr     Visit Vitals  /63   Pulse 89   Temp 36.8 °C (98.2 °F) (Temporal)   Resp 26        Lab Results   Component Value Date    CREATININE 0.73 10/13/2024    CREATININE 0.73 10/12/2024    CREATININE 0.75 10/12/2024    CREATININE 0.65 10/11/2024        Patient weight is as follows:   Vitals:    10/13/24 0940   Weight: 123 kg (271 lb 6.2 oz)       Cultures:  No results found for the encounter in last 14 days.       I/O last 3 completed shifts:  In: 1486.7 (12.1 mL/kg) [P.O.:480; I.V.:106.7 (0.9 mL/kg); IV Piggyback:900]  Out: 4480 (36.4 mL/kg) [Urine:4480 (1 mL/kg/hr)]  Weight: 123.1 kg   I/O during current shift:  I/O this shift:  In: 545.5 [P.O.:240; I.V.:5.5; IV Piggyback:300]  Out: 275 [Urine:275]    Temp (24hrs), Av.7 °C (98 °F), Min:36.5 °C (97.7 °F), Max:36.8 °C (98.2 °F)      Assessment/Plan    Within goal AUC range. Continue current vancomycin regimen.    This dosing regimen is predicted by [employer] to result in the following pharmacokinetic parameters:    Regimen: 1250 mg IV every 12 hours.  Exposure target: AUC24 (range)400-600 mg/L.hr   HOQ33-30: 544 mg/L.hr  AUC24,ss: 547 mg/L.hr  Probability of AUC24 > 400: 100 %  Ctrough,ss: 17.6 mg/L  Probability of Ctrough,ss > 20: 20 %    The next level will be obtained on 10/16 at 0500. May be obtained sooner if clinically indicated.   Will continue to monitor renal function daily while on vancomycin and order serum creatinine at least every 48 hours if not already ordered.  Follow for continued vancomycin needs, clinical response, and  signs/symptoms of toxicity.       Jonna Callahan, PharmD

## 2024-10-13 NOTE — PROGRESS NOTES
[facility] Pulmonary and Critical Care Medicine   Progress Note        Subjective   On bipap all night.   Tolerating better this AM  Improved work of breathing    Scheduled Medications:   aspirin, 81 mg, oral, Daily  dapagliflozin propanediol, 10 mg, oral, Daily  escitalopram, 10 mg, oral, Daily  [Held by provider] fluticasone furoate-vilanteroL, 1 puff, inhalation, Daily  [START ON 10/14/2024] furosemide, 80 mg, intravenous, Daily  [Held by provider] furosemide, 40 mg, oral, BID  heparin (porcine), 7,500 Units, subcutaneous, q8h PORTIA  insulin lispro, 0-15 Units, subcutaneous, Before meals & nightly  ipratropium-albuteroL, 3 mL, nebulization, q4h  levothyroxine, 100 mcg, oral, Daily  losartan, 50 mg, oral, Daily  [Held by provider] metFORMIN, 500 mg, oral, BID  methylPREDNISolone sodium succinate (PF), 40 mg, intravenous, q6h  metoprolol succinate XL, 25 mg, oral, Daily  morphine, 1 mg, intravenous, Once  morphine, 2 mg, intravenous, Once  nystatin, 1 Application, Topical, BID  perflutren protein A microsphere, 0.5 mL, intravenous, Once in imaging  piperacillin-tazobactam, 3.375 g, intravenous, q6h  simvastatin, 40 mg, oral, Nightly  sulfur hexafluoride microsphr, 2 mL, intravenous, Once in imaging  ticagrelor, 90 mg, oral, BID  vancomycin, 1,250 mg, intravenous, q12h         Continuous Medications:   dexmedeTOMIDine, 0.1-1.5 mcg/kg/hr, Last Rate: Stopped (10/13/24 0520)         PRN Medications:   PRN medications: acetaminophen, dextrose, dextrose, glucagon, glucagon, hydrOXYzine HCL, ipratropium-albuteroL, lidocaine-epinephrine, morphine, nitroglycerin, nitroglycerin, oxygen, oxygen, polyethylene glycol, vancomycin      Objective   Vitals:  Most Recent:  Vitals:    10/13/24 0900   BP: 120/50   Pulse: 81   Resp: (!) 30   Temp:    SpO2: 93%       24hr Min/Max:  Temp  Min: 36.5 °C (97.7 °F)  Max: 36.8 °C (98.2 °F)  Pulse  Min: 62  Max: 93  BP  Min: 114/45  Max: 134/56  Resp  Min: 18  Max: 41  SpO2  Min: 90 %   Max: 96 %    LDA:   Urethral Catheter (Active)   Placement Date: 10/10/24   Hand Hygiene Completed: Yes  Urine Returned: Yes   Number of days: 1         Vent settings:  FiO2 (%):  [40 %] 40 %  S RR:  [18] 18    Hemodynamic parameters for last 24 hours:         Intake/Output Summary (Last 24 hours) at 10/13/2024 1030  Last data filed at 10/13/2024 1009  Gross per 24 hour   Intake 1237.97 ml   Output 3100 ml   Net -1862.03 ml         Physical exam:    Physical Exam  Vitals reviewed.   Constitutional:       Appearance: Normal appearance.   HENT:      Head: Normocephalic and atraumatic.      Mouth/Throat:      Mouth: Mucous membranes are moist.   Eyes:      Extraocular Movements: Extraocular movements intact.      Pupils: Pupils are equal, round, and reactive to light.   Cardiovascular:      Rate and Rhythm: Normal rate and regular rhythm.   Pulmonary:      Effort: Pulmonary effort is normal.      Breath sounds: Normal breath sounds and air entry.   Abdominal:      General: Abdomen is flat. Bowel sounds are normal.      Palpations: Abdomen is soft.   Musculoskeletal:         General: Normal range of motion.      Cervical back: Normal range of motion and neck supple.   Skin:     General: Skin is warm and dry.   Neurological:      General: No focal deficit present.      Mental Status: She is alert and oriented to person, place, and time. Mental status is at baseline.          Lab/Radiology/Diagnostic Review:    All labs and Imaging have been personally reviewed.       Assessment/Plan       Neuro/Psych/Pain Ctrl/Sedation:  Anxiety  - Pain Management: tylenol  - CAM ICU  - Cont home lexapro  - Improved with the precedex    Respiratory/ENT:  Acute hypoxic resp failure 2/2 flash pulmonary edema  COPD  Asthma  - Maintain SPO2 >92%  - Continuous pulse ox monitoring   - Pulm hygiene  - Duonebs q4h  - bipap at bedtime and prn  - Titrate oxygen by HFNC  - Steroids q12 hours     Cardiovascular:  STEMI - no occlusions on heart  cath  CHF exacerbation  HTN  HLD  - Continuous cardiac monitoring per ICU protocol  - Maintain MAPS >65  - Daily EKGs prm  - Heart cath 10/10: lateral ST elevation myocardial infarction secondary to thrombus seen on cardiac catheterization distal diagonal. No indication for intervention since the lesion is very distally and there is ZAK I flow seen in the artery which is small overall for intervention. The remainder of her coronaries appears without obstructive coronary disease. Recommend aspirin Brilinta. High intensity statin.   - Cont home losartan, statin, lasix and spirinolactone, ASA  - continue brillanta  - hepairn sc     GI:  Morbid obesity  - NPO until off bipap  - BR with miralax prn     Renal/Volume Status (Intra & Extravascular):  - Maintain randolph catheter  - Maintain urine output 0.5-1.0cc/kg/hr  - Monitor I/O's  - Replete electrolytes to maintain K >4.0 and Mg >2.0  - Daily BMP, Mg  - Cr: 0.75  - Lasix 40mg IV q8    Endocrine  DMII  Hypothyroidism  - SSI q4hrs while NPO  - Cont home synthroid   - Monitor for hyper/hypoglycemia      Infectious Disease:  No fevers  - Monitor SIRS criteria  - WBC: 16.7  - ? Bilateral lower extremity cellulitis  - continue abx for now     Heme/Onc:  - Monitor for s/sx of anemia such as bleeding and bruising   - Transfuse if Hgb <7.0   - Daily CBC  - Hgb: 15.6     MSK:  - Padded pressure points   - PT/OT     Skin  - ICU skin protocol     Ethics/Code Status:  Full Code       I have personally spent 40 minutes of critical care time, exclusive of time spent on any procedures, in evaluation and management of this critically ill patient’s condition mentioned above in the assessment and plan.     Uriah Gutierrez MD  Pulmonary & Critical Care Attending   [id number]    Please excuse any typographical or unwanted errors within this documentation as voice recognition software was used to dictate this note.

## 2024-10-13 NOTE — CARE PLAN
Problem: Pain - Adult  Goal: Verbalizes/displays adequate comfort level or baseline comfort level  Outcome: Progressing  Flowsheets (Taken 10/13/2024 0332)  Verbalizes/displays adequate comfort level or baseline comfort level:   Encourage patient to monitor pain and request assistance   Assess pain using appropriate pain scale   Administer analgesics based on type and severity of pain and evaluate response   Implement non-pharmacological measures as appropriate and evaluate response   Consider cultural and social influences on pain and pain management   Notify Licensed Independent Practitioner if interventions unsuccessful or patient reports new pain     Problem: Safety - Adult  Goal: Free from fall injury  Outcome: Progressing     Problem: Discharge Planning  Goal: Discharge to home or other facility with appropriate resources  Outcome: Progressing  Flowsheets (Taken 10/13/2024 0332)  Discharge to home or other facility with appropriate resources:   Identify barriers to discharge with patient and caregiver   Arrange for needed discharge resources and transportation as appropriate   Identify discharge learning needs (meds, wound care, etc)   Arrange for interpreters to assist at discharge as needed   Refer to discharge planning if patient needs post-hospital services based on physician order or complex needs related to functional status, cognitive ability or social support system     Problem: Chronic Conditions and Co-morbidities  Goal: Patient's chronic conditions and co-morbidity symptoms are monitored and maintained or improved  Outcome: Progressing  Flowsheets (Taken 10/13/2024 0332)  Care Plan - Patient's Chronic Conditions and Co-Morbidity Symptoms are Monitored and Maintained or Improved:   Monitor and assess patient's chronic conditions and comorbid symptoms for stability, deterioration, or improvement   Collaborate with multidisciplinary team to address chronic and comorbid conditions and prevent  exacerbation or deterioration   Update acute care plan with appropriate goals if chronic or comorbid symptoms are exacerbated and prevent overall improvement and discharge     Problem: Skin  Goal: Decreased wound size/increased tissue granulation at next dressing change  10/13/2024 0336 by Vania Hobbs RN  Outcome: Progressing  Flowsheets (Taken 10/12/2024 1255 by Anjana Pitts, RN)  Decreased wound size/increased tissue granulation at next dressing change:   Promote sleep for wound healing   Protective dressings over bony prominences  10/13/2024 0332 by Vania Hobbs RN  Outcome: Progressing  Flowsheets (Taken 10/12/2024 1255 by Anjana Pitts RN)  Decreased wound size/increased tissue granulation at next dressing change:   Promote sleep for wound healing   Protective dressings over bony prominences  Goal: Participates in plan/prevention/treatment measures  10/13/2024 0336 by Vania Hobbs RN  Outcome: Progressing  Flowsheets (Taken 10/12/2024 1255 by Anjana Pitts, RN)  Participates in plan/prevention/treatment measures:   Elevate heels   Increase activity/out of bed for meals  10/13/2024 0332 by Vania Hobbs RN  Outcome: Progressing  Flowsheets (Taken 10/12/2024 1255 by Anjana Pitts, RN)  Participates in plan/prevention/treatment measures:   Elevate heels   Increase activity/out of bed for meals  Goal: Prevent/manage excess moisture  10/13/2024 0336 by Vania Hobbs RN  Outcome: Progressing  Flowsheets (Taken 10/12/2024 1255 by Anjana Pitts, RN)  Prevent/manage excess moisture:   Moisturize dry skin   Cleanse incontinence/protect with barrier cream  10/13/2024 0332 by Vania Hobbs RN  Outcome: Progressing  Flowsheets (Taken 10/12/2024 1255 by Anjana Pitts, RN)  Prevent/manage excess moisture:   Moisturize dry skin   Cleanse incontinence/protect with barrier cream  Goal: Prevent/minimize sheer/friction injuries  10/13/2024 0336 by Vania Hobbs RN  Outcome: Progressing  Flowsheets  (Taken 10/12/2024 1255 by Anjana Pitts, RN)  Prevent/minimize sheer/friction injuries:   Use pull sheet   Turn/reposition every 2 hours/use positioning/transfer devices   HOB 30 degrees or less  10/13/2024 0332 by Vania Hobbs RN  Outcome: Progressing  Flowsheets (Taken 10/12/2024 1255 by Anjana Pitts, RN)  Prevent/minimize sheer/friction injuries:   Use pull sheet   Turn/reposition every 2 hours/use positioning/transfer devices   HOB 30 degrees or less  Goal: Promote/optimize nutrition  10/13/2024 0336 by Vania Hobbs RN  Outcome: Progressing  Flowsheets (Taken 10/12/2024 1255 by Anjana Pitts, DIONY)  Promote/optimize nutrition:   Assist with feeding   Monitor/record intake including meals  10/13/2024 0332 by Vania Hobbs RN  Outcome: Progressing  Flowsheets (Taken 10/12/2024 1255 by Anjana Pitts RN)  Promote/optimize nutrition:   Assist with feeding   Monitor/record intake including meals  Goal: Promote skin healing  10/13/2024 0336 by Vania Hobbs RN  Outcome: Progressing  Flowsheets (Taken 10/12/2024 1255 by Anjana Pitts, RN)  Promote skin healing:   Turn/reposition every 2 hours/use positioning/transfer devices   Protective dressings over bony prominences  10/13/2024 0332 by Vania Hobbs RN  Outcome: Progressing  Flowsheets (Taken 10/12/2024 1255 by Anjana Pitts, RN)  Promote skin healing:   Turn/reposition every 2 hours/use positioning/transfer devices   Protective dressings over bony prominences     Problem: Respiratory  Goal: Clear secretions with interventions this shift  Outcome: Progressing  Flowsheets (Taken 10/13/2024 0332)  Clear secretions with interventions this shift: Suctioning  Goal: Minimize anxiety/maximize coping throughout shift  Outcome: Progressing  Flowsheets (Taken 10/13/2024 0332)  Minimize anxiety/maximize coping throughout shift: Monitor pain/anxiety level  Goal: Minimal/no exertional discomfort or dyspnea this shift  Outcome: Progressing  Goal: No  signs of respiratory distress (eg. Use of accessory muscles. Peds grunting)  Outcome: Progressing  Goal: Patent airway maintained this shift  Outcome: Progressing  Goal: Tolerate mechanical ventilation evidenced by VS/agitation level this shift  Outcome: Progressing  Goal: Tolerate pulmonary toileting this shift  Outcome: Progressing  Goal: Verbalize decreased shortness of breath this shift  Outcome: Progressing  Goal: Wean oxygen to maintain O2 saturation per order/standard this shift  Outcome: Progressing  Goal: Increase self care and/or family involvement in next 24 hours  Outcome: Progressing     Problem: Bathing  Goal: LTG - Patient will utilize adaptive techniques to bathe body Min A  Outcome: Progressing     Problem: Dressings Lower Extremities  Goal: LTG - Patient will dress lower body Min A  Outcome: Progressing     Problem: Dressing Upper Extremities  Goal: LTG - Patient will complete upper body dressing Mod I  Outcome: Progressing     Problem: Toileting  Goal: LTG - Patient will complete daily toileting tasks Min A  Outcome: Progressing   The patient's goals for the shift include Unable to assess, drowsy    The clinical goals for the shift include Patient will remain hemodynamically stable

## 2024-10-13 NOTE — PROGRESS NOTES
Physical Therapy                 Therapy Communication Note    Patient Name: Kay Pike  MRN: [id number]  Department: [facility] S ICU  Room: 11/11-A  Today's Date: 10/13/2024     Discipline: Physical Therapy    Missed Visit Reason: Order received & chart reviewed. Hold eval at this time as pt on BiPAP. Per bedside RN, oxygen weaning planned for later today.    Missed Time: Cancelled at 09:55. Second attempt at 14:35 but pt still on BiPAP per RN.

## 2024-10-14 ENCOUNTER — APPOINTMENT (OUTPATIENT)
Dept: CARDIOLOGY | Facility: HOSPITAL | Age: 59
DRG: 280 | End: 2024-10-14
Payer: COMMERCIAL

## 2024-10-14 ENCOUNTER — APPOINTMENT (OUTPATIENT)
Dept: RADIOLOGY | Facility: HOSPITAL | Age: 59
DRG: 280 | End: 2024-10-14
Payer: COMMERCIAL

## 2024-10-14 LAB
ALBUMIN SERPL BCP-MCNC: 3.2 G/DL (ref 3.4–5)
ALP SERPL-CCNC: 86 U/L (ref 33–110)
ALT SERPL W P-5'-P-CCNC: 31 U/L (ref 7–45)
ANION GAP SERPL CALCULATED.3IONS-SCNC: 7 MMOL/L (ref 10–20)
AST SERPL W P-5'-P-CCNC: 22 U/L (ref 9–39)
ATRIAL RATE: 88 BPM
BASOPHILS # BLD AUTO: 0.04 X10*3/UL (ref 0–0.1)
BASOPHILS NFR BLD AUTO: 0.2 %
BILIRUB SERPL-MCNC: 0.7 MG/DL (ref 0–1.2)
BUN SERPL-MCNC: 36 MG/DL (ref 6–23)
CALCIUM SERPL-MCNC: 8.7 MG/DL (ref 8.6–10.3)
CHLORIDE SERPL-SCNC: 99 MMOL/L (ref 98–107)
CO2 SERPL-SCNC: 36 MMOL/L (ref 21–32)
CREAT SERPL-MCNC: 0.75 MG/DL (ref 0.5–1.05)
EGFRCR SERPLBLD CKD-EPI 2021: >90 ML/MIN/1.73M*2
EOSINOPHIL # BLD AUTO: 0 X10*3/UL (ref 0–0.7)
EOSINOPHIL NFR BLD AUTO: 0 %
ERYTHROCYTE [DISTWIDTH] IN BLOOD BY AUTOMATED COUNT: 18.7 % (ref 11.5–14.5)
GLUCOSE BLD MANUAL STRIP-MCNC: 177 MG/DL (ref 74–99)
GLUCOSE BLD MANUAL STRIP-MCNC: 184 MG/DL (ref 74–99)
GLUCOSE BLD MANUAL STRIP-MCNC: 189 MG/DL (ref 74–99)
GLUCOSE BLD MANUAL STRIP-MCNC: 202 MG/DL (ref 74–99)
GLUCOSE SERPL-MCNC: 163 MG/DL (ref 74–99)
HCT VFR BLD AUTO: 43.7 % (ref 36–46)
HGB BLD-MCNC: 14.1 G/DL (ref 12–16)
IMM GRANULOCYTES # BLD AUTO: 0.39 X10*3/UL (ref 0–0.7)
IMM GRANULOCYTES NFR BLD AUTO: 2.1 % (ref 0–0.9)
LYMPHOCYTES # BLD AUTO: 0.88 X10*3/UL (ref 1.2–4.8)
LYMPHOCYTES NFR BLD AUTO: 4.7 %
MAGNESIUM SERPL-MCNC: 2.42 MG/DL (ref 1.6–2.4)
MCH RBC QN AUTO: 26.6 PG (ref 26–34)
MCHC RBC AUTO-ENTMCNC: 32.3 G/DL (ref 32–36)
MCV RBC AUTO: 83 FL (ref 80–100)
MONOCYTES # BLD AUTO: 0.47 X10*3/UL (ref 0.1–1)
MONOCYTES NFR BLD AUTO: 2.5 %
NEUTROPHILS # BLD AUTO: 16.81 X10*3/UL (ref 1.2–7.7)
NEUTROPHILS NFR BLD AUTO: 90.5 %
NRBC BLD-RTO: 0 /100 WBCS (ref 0–0)
P AXIS: 56 DEGREES
P OFFSET: 197 MS
P ONSET: 136 MS
PHOSPHATE SERPL-MCNC: 3.8 MG/DL (ref 2.5–4.9)
PLATELET # BLD AUTO: 192 X10*3/UL (ref 150–450)
POTASSIUM SERPL-SCNC: 4.3 MMOL/L (ref 3.5–5.3)
PR INTERVAL: 160 MS
PROCALCITONIN SERPL-MCNC: 0.14 NG/ML
PROT SERPL-MCNC: 6.4 G/DL (ref 6.4–8.2)
Q ONSET: 216 MS
QRS COUNT: 15 BEATS
QRS DURATION: 124 MS
QT INTERVAL: 390 MS
QTC CALCULATION(BAZETT): 471 MS
QTC FREDERICIA: 443 MS
R AXIS: -40 DEGREES
RBC # BLD AUTO: 5.3 X10*6/UL (ref 4–5.2)
SODIUM SERPL-SCNC: 138 MMOL/L (ref 136–145)
T AXIS: 87 DEGREES
T OFFSET: 411 MS
VENTRICULAR RATE: 88 BPM
WBC # BLD AUTO: 18.6 X10*3/UL (ref 4.4–11.3)

## 2024-10-14 PROCEDURE — 36415 COLL VENOUS BLD VENIPUNCTURE: CPT

## 2024-10-14 PROCEDURE — 97530 THERAPEUTIC ACTIVITIES: CPT | Mod: GO

## 2024-10-14 PROCEDURE — 2500000004 HC RX 250 GENERAL PHARMACY W/ HCPCS (ALT 636 FOR OP/ED): Performed by: INTERNAL MEDICINE

## 2024-10-14 PROCEDURE — 2500000004 HC RX 250 GENERAL PHARMACY W/ HCPCS (ALT 636 FOR OP/ED)

## 2024-10-14 PROCEDURE — 97161 PT EVAL LOW COMPLEX 20 MIN: CPT | Mod: GP | Performed by: PHYSICAL THERAPIST

## 2024-10-14 PROCEDURE — 84100 ASSAY OF PHOSPHORUS: CPT

## 2024-10-14 PROCEDURE — 2500000004 HC RX 250 GENERAL PHARMACY W/ HCPCS (ALT 636 FOR OP/ED): Performed by: NURSE PRACTITIONER

## 2024-10-14 PROCEDURE — 2500000002 HC RX 250 W HCPCS SELF ADMINISTERED DRUGS (ALT 637 FOR MEDICARE OP, ALT 636 FOR OP/ED): Performed by: INTERNAL MEDICINE

## 2024-10-14 PROCEDURE — 2500000002 HC RX 250 W HCPCS SELF ADMINISTERED DRUGS (ALT 637 FOR MEDICARE OP, ALT 636 FOR OP/ED)

## 2024-10-14 PROCEDURE — 84145 PROCALCITONIN (PCT): CPT | Mod: WESLAB

## 2024-10-14 PROCEDURE — 2500000001 HC RX 250 WO HCPCS SELF ADMINISTERED DRUGS (ALT 637 FOR MEDICARE OP)

## 2024-10-14 PROCEDURE — 93005 ELECTROCARDIOGRAM TRACING: CPT

## 2024-10-14 PROCEDURE — 93010 ELECTROCARDIOGRAM REPORT: CPT | Performed by: INTERNAL MEDICINE

## 2024-10-14 PROCEDURE — 99233 SBSQ HOSP IP/OBS HIGH 50: CPT | Performed by: NURSE PRACTITIONER

## 2024-10-14 PROCEDURE — 99291 CRITICAL CARE FIRST HOUR: CPT

## 2024-10-14 PROCEDURE — 82947 ASSAY GLUCOSE BLOOD QUANT: CPT

## 2024-10-14 PROCEDURE — 93970 EXTREMITY STUDY: CPT

## 2024-10-14 PROCEDURE — 83735 ASSAY OF MAGNESIUM: CPT

## 2024-10-14 PROCEDURE — 85025 COMPLETE CBC W/AUTO DIFF WBC: CPT

## 2024-10-14 PROCEDURE — 2500000001 HC RX 250 WO HCPCS SELF ADMINISTERED DRUGS (ALT 637 FOR MEDICARE OP): Performed by: INTERNAL MEDICINE

## 2024-10-14 PROCEDURE — 2020000001 HC ICU ROOM DAILY

## 2024-10-14 PROCEDURE — 94660 CPAP INITIATION&MGMT: CPT

## 2024-10-14 PROCEDURE — 94664 DEMO&/EVAL PT USE INHALER: CPT

## 2024-10-14 PROCEDURE — 93971 EXTREMITY STUDY: CPT | Performed by: STUDENT IN AN ORGANIZED HEALTH CARE EDUCATION/TRAINING PROGRAM

## 2024-10-14 PROCEDURE — 2500000001 HC RX 250 WO HCPCS SELF ADMINISTERED DRUGS (ALT 637 FOR MEDICARE OP): Performed by: NURSE PRACTITIONER

## 2024-10-14 PROCEDURE — 94640 AIRWAY INHALATION TREATMENT: CPT

## 2024-10-14 PROCEDURE — 9420000001 HC RT PATIENT EDUCATION 5 MIN

## 2024-10-14 PROCEDURE — 80053 COMPREHEN METABOLIC PANEL: CPT

## 2024-10-14 PROCEDURE — 97110 THERAPEUTIC EXERCISES: CPT | Mod: GO

## 2024-10-14 PROCEDURE — 97530 THERAPEUTIC ACTIVITIES: CPT | Mod: GP | Performed by: PHYSICAL THERAPIST

## 2024-10-14 RX ORDER — METOPROLOL SUCCINATE 25 MG/1
25 TABLET, EXTENDED RELEASE ORAL 2 TIMES DAILY
Status: DISCONTINUED | OUTPATIENT
Start: 2024-10-14 | End: 2024-10-17

## 2024-10-14 RX ORDER — DIGOXIN 125 MCG
125 TABLET ORAL ONCE
Status: DISCONTINUED | OUTPATIENT
Start: 2024-10-15 | End: 2024-10-15

## 2024-10-14 RX ORDER — FUROSEMIDE 10 MG/ML
40 INJECTION INTRAMUSCULAR; INTRAVENOUS EVERY 8 HOURS
Status: CANCELLED | OUTPATIENT
Start: 2024-10-14

## 2024-10-14 RX ORDER — DIGOXIN 0.25 MG/ML
250 INJECTION INTRAMUSCULAR; INTRAVENOUS ONCE
Status: COMPLETED | OUTPATIENT
Start: 2024-10-14 | End: 2024-10-14

## 2024-10-14 RX ORDER — METOPROLOL TARTRATE 1 MG/ML
5 INJECTION, SOLUTION INTRAVENOUS ONCE
Status: COMPLETED | OUTPATIENT
Start: 2024-10-14 | End: 2024-10-14

## 2024-10-14 RX ORDER — DIGOXIN 0.25 MG/ML
500 INJECTION INTRAMUSCULAR; INTRAVENOUS ONCE
Status: COMPLETED | OUTPATIENT
Start: 2024-10-14 | End: 2024-10-14

## 2024-10-14 RX ADMIN — ASPIRIN 81 MG: 81 TABLET, COATED ORAL at 08:26

## 2024-10-14 RX ADMIN — INSULIN LISPRO 3 UNITS: 100 INJECTION, SOLUTION INTRAVENOUS; SUBCUTANEOUS at 21:12

## 2024-10-14 RX ADMIN — VANCOMYCIN 1250 MG: 1.75 INJECTION, SOLUTION INTRAVENOUS at 05:45

## 2024-10-14 RX ADMIN — HEPARIN SODIUM 7500 UNITS: 5000 INJECTION, SOLUTION INTRAVENOUS; SUBCUTANEOUS at 21:02

## 2024-10-14 RX ADMIN — METOPROLOL SUCCINATE 25 MG: 25 TABLET, FILM COATED, EXTENDED RELEASE ORAL at 21:00

## 2024-10-14 RX ADMIN — TICAGRELOR 90 MG: 90 TABLET ORAL at 08:26

## 2024-10-14 RX ADMIN — LOSARTAN POTASSIUM 50 MG: 50 TABLET, FILM COATED ORAL at 08:26

## 2024-10-14 RX ADMIN — METHYLPREDNISOLONE SODIUM SUCCINATE 40 MG: 40 INJECTION, POWDER, FOR SOLUTION INTRAMUSCULAR; INTRAVENOUS at 23:33

## 2024-10-14 RX ADMIN — IPRATROPIUM BROMIDE AND ALBUTEROL SULFATE 3 ML: 2.5; .5 SOLUTION RESPIRATORY (INHALATION) at 11:16

## 2024-10-14 RX ADMIN — DIGOXIN 500 MCG: 0.25 INJECTION INTRAMUSCULAR; INTRAVENOUS at 12:31

## 2024-10-14 RX ADMIN — IPRATROPIUM BROMIDE AND ALBUTEROL SULFATE 3 ML: 2.5; .5 SOLUTION RESPIRATORY (INHALATION) at 00:52

## 2024-10-14 RX ADMIN — INSULIN LISPRO 6 UNITS: 100 INJECTION, SOLUTION INTRAVENOUS; SUBCUTANEOUS at 16:01

## 2024-10-14 RX ADMIN — HEPARIN SODIUM 7500 UNITS: 5000 INJECTION, SOLUTION INTRAVENOUS; SUBCUTANEOUS at 05:45

## 2024-10-14 RX ADMIN — IPRATROPIUM BROMIDE AND ALBUTEROL SULFATE 3 ML: 2.5; .5 SOLUTION RESPIRATORY (INHALATION) at 06:49

## 2024-10-14 RX ADMIN — IPRATROPIUM BROMIDE AND ALBUTEROL SULFATE 3 ML: 2.5; .5 SOLUTION RESPIRATORY (INHALATION) at 19:11

## 2024-10-14 RX ADMIN — INSULIN LISPRO 3 UNITS: 100 INJECTION, SOLUTION INTRAVENOUS; SUBCUTANEOUS at 08:24

## 2024-10-14 RX ADMIN — SIMVASTATIN 40 MG: 40 TABLET, FILM COATED ORAL at 21:01

## 2024-10-14 RX ADMIN — FUROSEMIDE 80 MG: 10 INJECTION, SOLUTION INTRAMUSCULAR; INTRAVENOUS at 08:26

## 2024-10-14 RX ADMIN — ACETAMINOPHEN 325MG 650 MG: 325 TABLET ORAL at 12:44

## 2024-10-14 RX ADMIN — LEVOTHYROXINE SODIUM 100 MCG: 0.1 TABLET ORAL at 05:45

## 2024-10-14 RX ADMIN — METOPROLOL TARTRATE 5 MG: 5 INJECTION INTRAVENOUS at 10:57

## 2024-10-14 RX ADMIN — IPRATROPIUM BROMIDE AND ALBUTEROL SULFATE 3 ML: 2.5; .5 SOLUTION RESPIRATORY (INHALATION) at 23:50

## 2024-10-14 RX ADMIN — PIPERACILLIN SODIUM AND TAZOBACTAM SODIUM 3.38 G: 3; .375 INJECTION, SOLUTION INTRAVENOUS at 03:45

## 2024-10-14 RX ADMIN — PIPERACILLIN SODIUM AND TAZOBACTAM SODIUM 3.38 G: 3; .375 INJECTION, SOLUTION INTRAVENOUS at 15:05

## 2024-10-14 RX ADMIN — IPRATROPIUM BROMIDE AND ALBUTEROL SULFATE 3 ML: 2.5; .5 SOLUTION RESPIRATORY (INHALATION) at 14:36

## 2024-10-14 RX ADMIN — NYSTATIN 1 APPLICATION: 100000 POWDER TOPICAL at 10:00

## 2024-10-14 RX ADMIN — PIPERACILLIN SODIUM AND TAZOBACTAM SODIUM 3.38 G: 3; .375 INJECTION, SOLUTION INTRAVENOUS at 21:01

## 2024-10-14 RX ADMIN — ESCITALOPRAM OXALATE 10 MG: 10 TABLET ORAL at 08:26

## 2024-10-14 RX ADMIN — METHYLPREDNISOLONE SODIUM SUCCINATE 40 MG: 40 INJECTION, POWDER, FOR SOLUTION INTRAMUSCULAR; INTRAVENOUS at 11:28

## 2024-10-14 RX ADMIN — DAPAGLIFLOZIN 10 MG: 10 TABLET, FILM COATED ORAL at 08:26

## 2024-10-14 RX ADMIN — HEPARIN SODIUM 7500 UNITS: 5000 INJECTION, SOLUTION INTRAVENOUS; SUBCUTANEOUS at 15:04

## 2024-10-14 RX ADMIN — METOPROLOL SUCCINATE 25 MG: 25 TABLET, FILM COATED, EXTENDED RELEASE ORAL at 08:26

## 2024-10-14 RX ADMIN — DIGOXIN 250 MCG: 0.25 INJECTION INTRAMUSCULAR; INTRAVENOUS at 17:08

## 2024-10-14 RX ADMIN — METOPROLOL TARTRATE 5 MG: 5 INJECTION INTRAVENOUS at 11:28

## 2024-10-14 RX ADMIN — TICAGRELOR 90 MG: 90 TABLET ORAL at 21:01

## 2024-10-14 RX ADMIN — IPRATROPIUM BROMIDE AND ALBUTEROL SULFATE 3 ML: 2.5; .5 SOLUTION RESPIRATORY (INHALATION) at 04:46

## 2024-10-14 RX ADMIN — PIPERACILLIN SODIUM AND TAZOBACTAM SODIUM 3.38 G: 3; .375 INJECTION, SOLUTION INTRAVENOUS at 08:26

## 2024-10-14 RX ADMIN — NYSTATIN 1 APPLICATION: 100000 POWDER TOPICAL at 21:00

## 2024-10-14 RX ADMIN — INSULIN LISPRO 3 UNITS: 100 INJECTION, SOLUTION INTRAVENOUS; SUBCUTANEOUS at 12:30

## 2024-10-14 ASSESSMENT — COGNITIVE AND FUNCTIONAL STATUS - GENERAL
CLIMB 3 TO 5 STEPS WITH RAILING: TOTAL
DRESSING REGULAR UPPER BODY CLOTHING: A LITTLE
DAILY ACTIVITIY SCORE: 15
MOVING TO AND FROM BED TO CHAIR: A LITTLE
WALKING IN HOSPITAL ROOM: TOTAL
MOBILITY SCORE: 13
STANDING UP FROM CHAIR USING ARMS: A LITTLE
DRESSING REGULAR LOWER BODY CLOTHING: A LOT
TOILETING: A LOT
HELP NEEDED FOR BATHING: A LOT
EATING MEALS: A LITTLE
MOVING FROM LYING ON BACK TO SITTING ON SIDE OF FLAT BED WITH BEDRAILS: A LITTLE
PERSONAL GROOMING: A LITTLE
TURNING FROM BACK TO SIDE WHILE IN FLAT BAD: A LOT

## 2024-10-14 ASSESSMENT — PAIN - FUNCTIONAL ASSESSMENT
PAIN_FUNCTIONAL_ASSESSMENT: 0-10
PAIN_FUNCTIONAL_ASSESSMENT: CPOT (CRITICAL CARE PAIN OBSERVATION TOOL)
PAIN_FUNCTIONAL_ASSESSMENT: 0-10

## 2024-10-14 ASSESSMENT — PAIN SCALES - GENERAL
PAINLEVEL_OUTOF10: 0 - NO PAIN
PAINLEVEL_OUTOF10: 4

## 2024-10-14 ASSESSMENT — PAIN DESCRIPTION - DESCRIPTORS: DESCRIPTORS: ACHING

## 2024-10-14 ASSESSMENT — ACTIVITIES OF DAILY LIVING (ADL): ADL_ASSISTANCE: INDEPENDENT

## 2024-10-14 ASSESSMENT — PAIN DESCRIPTION - ORIENTATION: ORIENTATION: MID

## 2024-10-14 ASSESSMENT — PAIN SCALES - WONG BAKER: WONGBAKER_NUMERICALRESPONSE: NO HURT

## 2024-10-14 ASSESSMENT — PAIN DESCRIPTION - LOCATION: LOCATION: SACRUM

## 2024-10-14 NOTE — PROGRESS NOTES
Physical Therapy    Physical Therapy Evaluation & Treatment    Patient Name: Kay Pike  MRN: [id number]  Department: [facility]  Room: 11/11-A  Today's Date: 10/14/2024   Time Calculation  Start Time: 1001  Stop Time: 1037  Time Calculation (min): 36 min    Assessment/Plan   PT Assessment Results: Decreased strength, Decreased mobility, Decreased endurance, Decreased safety awareness, Obesity, Decreased skin integrity, Impaired balance  Rehab Prognosis: Good  Strengths: Support of extended family/friends, Premorbid level of function, Living arrangement secure, Attitude of self  Barriers to Participation: Comorbidities, Financial security (per TCC's note)  End of Session Communication: Bedside nurse  PT Assessment: She presented with the above listed impairments (see Assessment Results). Pt required minimally increased assist during LIMITED functional mobility compared to her reported baseline of independence. Pt would benefit from continued skilled PT services for maximizing independence and safety prior to & after discharge (MODERATE intensity).  End of Session Patient Position: Up in chair, Alarm off, not on at start of session (call light & tray table within reach; sister & niece in room)     IP OR SWING BED PT PLAN  Inpatient or Swing Bed: Inpatient  PT Plan  Treatment/Interventions: Bed mobility, Transfer training, Gait training, Strengthening, Therapeutic exercise, Therapeutic activity, Balance training, Endurance training  PT Plan: Ongoing PT  PT Frequency: 6 times per week  PT Discharge Recommendations: Moderate intensity level of continued care  Equipment Recommended upon Discharge: Other (comment) (if homegoing, consider Jr FWW)  PT Recommended Transfer Status: Assist x1, Assistive device (Jr FWW)  PT - OK to Discharge: Yes      Subjective     General Visit Information:  Reason for Referral: Impaired functional mobility. This 59 year old presented to the ED from home on 10/1/24 with c/o SOB for 1 week. She  was admitted for acute on chronic respiratory failure due to COPD exacerbation & acute decompensated HF, NSTEMI 2° to thrombus per cardiac cath (no intervention at time of procedure), and CHF exacerbation.       TTE (10/11/24) : LVEF 40-45%, mitral & aortic valve regurgitation & IVC severly dilated.    Past Medical History Relevant to Rehab: anxiety, asthma, COPD, DM-II, edema (BLE), morbid obesity, VLADIMIR, current tobacco smoker (> 1 PPD).    Family/Caregiver Present: Yes (sister & niece)  Co-Treatment: OT (for maximal safety of pt & staff and maximal pt participation (due to low cardiopulmonary endurance) during 1st transfer OOB since admission)  Prior to Session Communication: Bedside nurse  Patient Position Received: Bed, 4 rail up, Alarm off, not on at start of session  General Comment: Cleared by RN for participation. Pt agreed to session and was fully engaged throughout.    Home Living:  Home Living  Type of Home: House  Lives With:  (Sister & niece)  Home Adaptive Equipment:  (FWW, not fitted for pt)  Home Layout: Able to live on main level with bedroom/bathroom, Laundry main level  Home Access:  (3 stairs with bilateral rails close together)  Bathroom Shower/Tub: Tub/shower unit  Bathroom Equipment: Grab bars in shower  Home Living Comments: Per chart review, pt was a recent .    Prior Level of Function:  Prior Function Per Pt/Caregiver Report  Receives Help From: Family  ADL Assistance: Independent  Homemaking Assistance: Independent (shared with sister who did shopping, cooking & household cleaning.)  Ambulatory Assistance:  (denied any falls within the past year)  Transfers: Independent  Gait: Independent  Stairs:  (Mod I with unilateral rail)  Vocational: Part time employment ()  Prior Function Comments: (-) driving; sister provided transportation.    Precautions:  Precautions  Hearing/Visual Limitations: Reading glasses (not donned).  Medical Precautions: Fall precautions, Oxygen  therapy device and L/min, Cardiac precautions  Precautions Comment: HFNC 40 lpm, 40% FiO2 (no supplemental O2 at baseline). quinn Mcnamara,    Vital Signs (Past 2hrs)        Date/Time Vitals Session Patient Position Pulse Resp SpO2 BP MAP (mmHg)    10/14/24 1053 --  --  169  26  96 %  86/60  68     10/14/24 1100 --  --  155  21  96 %  107/56  73     10/14/24 1101 --  --  151  22  91 %  111/71  85     10/14/24 1130 --  --  149  29  95 %  118/72  86     10/14/24 1132 --  --  159  24  93 %  122/98  107            Near start of session with pt in supine/HOB elevated:  HR  90  SpO2  95%  RR  26  BP (MAP)  138/84 (100) at 10:00.    SpO2 >/=92% throughout session.      Objective   Pain:  Pain Assessment  0-10 (Numeric) Pain Score: 0 - No pain    Cognition:  Cognition  Orientation Level: Oriented X4  Following Commands: Follows one step commands consistently  Cognition Comments: Pt demo'd what appeared to be mildly anxious behaviors during/after standing mobility.    General Assessments:  General Observation  BLE edema (at least +3). Bilat lower legs/feet with brown discoloration and nodules/papules. Minor sanginous leaking noted on L lateral lower leg; RN informed after session.     Activity Tolerance  Endurance: Decreased tolerance for upright activites due to new & significant supplemental O2 requirement. Pt tolerated </=12 minutes seated EOB/standing activities with multiple rest breaks & instruction/cues for oxygen conservation.    Sensation  Not tested; pt denied paresthesias now & at baseline.    Strength  BLE: grossly >/=3+/5    Motor Control  Not formally assessed.  Movements are Fluid and Coordinated: Yes    Postural Control  Within Functional Limits  Static Sitting Balance (BUE supported: close S/CGA.)    Dynamic Sitting Balance (BUE supported during unilateral LE AROM assessment: close S/CGA.)    Static Standing Balance (BUE supported on FWW: CGA)    Dynamic Standing Balance (BUE supported on FWW: CGA for balance  and Ron x1 for walker mgmt)      Functional Assessments:  Bed Mobility  Bed Mobility 1: Supine to sitting  Level of Assistance 1: Minimum assistance (intermittently to trunk. Minimal verbal/visual cues for sequencing.)  Bed Mobility Comments 1: HOB elevated </=40° & used L bed rail    Bed Mobility  2: Scooting (fwd while seated EOB)  Level of Assistance 2: Contact guard (pt used BUEs)    Transfers  Technique 1: Sit to stand  Transfer Device 1: Walker (FWW & elevated EOB due to pt's stature)  Transfer Level of Assistance 1: Contact guard (Moderate verbal/tactile cues for BUE placement in start position.)  Trials/Comments 1: x2 with BUE starting on walker for 1st trial    Technique 2: Stand to sit  Transfer Device 2: Walker (FWW; elevated EOB due pt pt's stature --or-- bedside chair)  Transfer Level of Assistance 2:  (CGA/Ron x1 at (elevated) EOB using LUE on bed rail; 2 attempts for maximal anterior pelvic tilt during transfer. Ron x1 at bedside chair for increased eccentric control as pt's hands remained on walker. Moderate VCs for walker safety & sequencing.)    Technique 3: Stand pivot, To left (EOB>bedside chair)  Transfer Device 3: Walker  Transfer Level of Assistance 3: Minimum assistance (intermittently for walker management otherwise CGA at trunk. Minimal VCs for sequencing.)    Ambulation/Gait Training  Ambulation/Gait Training Performed: Yes  Ambulation/Gait Training 1  Surface 1: Level tile  Device 1: Rolling walker  Assistance 1: Minimum assistance (for intermittent walker management otherwise CGA at trunk. Minimal verbal cues for walker sequencing.)  Comments/Distance (ft) 1: Two trials of lateral steps at EOB: 3 toward L side & 3 toward R side immediately followed by stand pivot transfer. Slow velocity, able to clear bilateral feet. No threat for LOB.     Treatments:  Therapeutic Activity  -Educated pt in PT role, POC, frequency, disposition, fall precautions (use of call light for nursing staff  assist/supervision with mobility), walker safety principles, and energy/oxygen conservation techniques.  -Pt instruction in & performed pursed lip breathing, x2-3 sets of 3-5 reps each. Consistent verbal/visual cues provided.  -See balance above for out of supine positioning (total time >15 min) to facilitate core strengthening (seated EOB, </=6 min; static/dynamic standing total time </=90 sec) & pulmonary hygiene.   -See above for 2nd trial of sit<>stand and consistent cues during all functional mobility.      Outcome Measures:  [facility] Basic Mobility  Turning from your back to your side while in a flat bed without using bedrails: A little  Moving from lying on your back to sitting on the side of a flat bed without using bedrails: A lot  Moving to and from bed to chair (including a wheelchair): A little  Standing up from a chair using your arms (e.g. wheelchair or bedside chair): A little  To walk in hospital room: Total  Climbing 3-5 steps with railing: Total  Basic Mobility - Total Score: 13    FSS-ICU  Ambulation: Walks <50 feet with any assistance x1 or walks any distance with assistance x2 people  Rolling: Minimal assistance (performs 75% or more of task)  Sitting: Minimal assistance (performs 75% or more of task)  Transfer Sit-to-Stand: Minimal assistance (performs 75% or more of task)  Transfer Supine-to-Sit: Minimal assistance (performs 75% or more of task)  Total Score: 17    Encounter Problems       Encounter Problems (Active)       PT Problem       Pt will transition supine<>sit using hospital bed with mod I.        Start:  10/14/24    Expected End:  11/10/24            Pt will transfer sit<>stand with FWW & mod I.        Start:  10/14/24    Expected End:  11/10/24            Pt will ambulate >/=50 ft with FWW & mod I.        Start:  10/14/24    Expected End:  11/10/24            Pt will demonstrate oxygen conservation strategies (eg, pursed lip breathing technique, no verbalization, etc) during exertive  functional mobility/activities with at most 1 verbal cue (distant S).       Start:  10/14/24    Expected End:  11/10/24                   Education Documentation  Mobility Training, taught by Yas Loja PT at 10/14/2024 12:00 PM.  Learner: Family, Patient  Readiness: Acceptance  Method: Explanation  Response: Verbalizes Understanding, Needs Reinforcement    Education Comments  No comments found.

## 2024-10-14 NOTE — PROGRESS NOTES
Critical Care Medicine       Date:  10/14/2024  Patient:  Kay Pike  YOB: 1965  MRN:  [id number]   Admit Date:  10/10/2024      Chief Complaint   Patient presents with    Shortness of Breath     Called ems for SOB. Recently got over cellulitis and on antibiotics. Patient states no chest pain just difficulty breathing. Patient given duoneb  and solumedrol.         History of Present Illness:  Kay Pike is a 59 y.o. year old female patient with Past Medical History of  DMII, hypothroidism, bilateral leg edema, VLADIMIR, HLD, anxiety, asthma and COPD presented to the ER for SOB that has been going on the past week. EMS was called today and she did receive steroids and breathing treatment. She reports that her symptoms have somewhat improved. She does not have any chest pain. Just finished doxy for right leg cellulitis. While in the ER and EKG was obtained showing 1 mm ST elevation lateral leads with reciprocal ST depression inferior leads. No previous EKG. Code STEMI was called and Dr. Kohli took the patient to the cath lab. No intervention was needed.      Interval ICU Events:  10/10: When she arrived to ICU she was tachynpenic in the 50's, increased WOB, tachycardiac . /70 and believe she had flash pulmonary edema. We gave 3 morphine, 40 lasix, cont Bipap, NTG paste. Will obtain ABG.     10/14: Bipap at night and HFNC throughout day. Cont zosyn will DC vanc due to neg MRSA. Patient went into an episode of SVT rates in 150's. 5 of metop given.     Objective     No past medical history on file.  Past Surgical History:   Procedure Laterality Date    CARDIAC CATHETERIZATION N/A 10/10/2024    Procedure: Left Heart Cath, No LV;  Surgeon: René Kohli MD;  Location: [facility] Cardiac Cath Lab;  Service: Cardiovascular;  Laterality: N/A;     Medications Prior to Admission   Medication Sig Dispense Refill Last Dose    albuterol 90 mcg/actuation inhaler Inhale 2 puffs every 6 hours if needed for  wheezing. 25.5 g 0 10/10/2024    escitalopram (Lexapro) 10 mg tablet Take 1 tablet (10 mg) by mouth once daily. 90 tablet 0 10/10/2024    fluticasone propion-salmeteroL (Advair Diskus) 500-50 mcg/dose diskus inhaler Inhale 1 puff 2 times a day. 60 each 3 10/10/2024    furosemide (Lasix) 40 mg tablet Take 1 tablet (40 mg) by mouth 2 times a day. 90 tablet 1 10/10/2024    apremilast (Otezla Starter) 10 mg (4)-20 mg (4)-30 mg (47) tablets,dose pack tablet therapy pack Take 1 tablet by mouth 2 times a day for 28 days. Do not crush, chew, or split tablets. 55 each 0     [] doxycycline (Vibramycin) 100 mg capsule Take 1 capsule (100 mg) by mouth 2 times a day for 21 days. Take with at least 8 ounces (large glass) of water, do not lie down for 30 minutes after 42 capsule 0     levothyroxine (Synthroid, Levoxyl) 100 mcg tablet Take 1 tablet (100 mcg) by mouth once daily in the morning. Take before meals. Take on an empty stomach 90 tablet 1 Unknown    losartan (Cozaar) 50 mg tablet Take 1 tablet (50 mg) by mouth once daily. 90 tablet 3 Unknown    metFORMIN (Glucophage) 500 mg tablet Take 1 tablet (500 mg) by mouth 2 times daily (morning and late afternoon). 180 tablet 1 Unknown    [] methylPREDNISolone (Medrol Dospak) 4 mg tablets Take as directed on package. 21 tablet 0 Unknown    nebulizer accessories misc 1 Units if needed (SOB). 10 each 1 Unknown    nebulizer and compressor device 1 Dose if needed (SOB). 1 each 0 Unknown    potassium chloride ER (Micro-K) 10 mEq ER capsule Take 1 capsule (10 mEq) by mouth once daily. Do not crush or chew. 90 capsule 1 Unknown    silver sulfADIAZINE (Silvadene) 1 % cream Apply to affected area twice a day or with each dressing change. 400 g 1 Unknown    simvastatin (Zocor) 40 mg tablet Take 1 tablet (40 mg) by mouth once every 24 hours. 90 tablet 1 Unknown    spironolactone (Aldactone) 100 mg tablet Take 1 tablet (100 mg) by mouth once daily. 90 tablet 1 Unknown     tirzepatide (Mounjaro) 2.5 mg/0.5 mL pen injector Inject 2.5 mg under the skin 1 (one) time per week. 2 mL 0 Unknown    tirzepatide (Mounjaro) 5 mg/0.5 mL pen injector Inject 5 mg under the skin 1 (one) time per week. 2 mL 2 Unknown    triamcinolone (Kenalog) 0.1 % cream Apply topically 2 times a day. Apply to affected area 1-2 times daily as needed. 454 g 3 Unknown     Patient has no known allergies.  Social History     Tobacco Use    Smoking status: Every Day     Types: Cigarettes    Smokeless tobacco: Never   Substance Use Topics    Alcohol use: Never    Drug use: Never     Family History   Problem Relation Name Age of Onset    Diabetes Mother      Diabetes Father         Hospital Medications:    dexmedeTOMIDine, 0.1-1.5 mcg/kg/hr, Last Rate: Stopped (10/13/24 0520)          Current Facility-Administered Medications:     acetaminophen (Tylenol) tablet 650 mg, 650 mg, oral, q6h PRN, René Kohli MD    aspirin EC tablet 81 mg, 81 mg, oral, Daily, René Kohli MD, 81 mg at 10/13/24 1017    dapagliflozin propanediol (Farxiga) tablet 10 mg, 10 mg, oral, Daily, René Kohli MD, 10 mg at 10/13/24 1106    dexmedeTOMIDine (Precedex) 400 mcg in 100 mL (4 mcg/mL) sodium chloride 0.9% infusion, 0.1-1.5 mcg/kg/hr, intravenous, Continuous, Uriah Gutierrez MD, Stopped at 10/13/24 0520    dextrose 50 % injection 12.5 g, 12.5 g, intravenous, q15 min PRN, Nan Santos PA-C    dextrose 50 % injection 25 g, 25 g, intravenous, q15 min PRN, Nan Santos PA-C    escitalopram (Lexapro) tablet 10 mg, 10 mg, oral, Daily, Nan Santos PA-C, 10 mg at 10/13/24 1017    [Held by provider] fluticasone furoate-vilanteroL (Breo Ellipta) 200-25 mcg/dose inhaler 1 puff, 1 puff, inhalation, Daily, Nan Santos PA-C    furosemide (Lasix) injection 80 mg, 80 mg, intravenous, Daily, René Kohli MD    [Held by provider] furosemide (Lasix) tablet 40 mg, 40 mg, oral, BID, Nan Santos PA-C    glucagon (Glucagen) injection 1  mg, 1 mg, intramuscular, q15 min PRN, Nan Santos PA-C    glucagon (Glucagen) injection 1 mg, 1 mg, intramuscular, q15 min PRN, Nan Santos PA-C    heparin (porcine) injection 7,500 Units, 7,500 Units, subcutaneous, q8h PORTIA, René Kohli MD, 7,500 Units at 10/14/24 0545    hydrOXYzine HCL (Atarax) tablet 25 mg, 25 mg, oral, q6h PRN, JORGE L Mccollum-CNP, 25 mg at 10/13/24 2053    insulin lispro (HumaLOG) injection 0-15 Units, 0-15 Units, subcutaneous, Before meals & nightly, Lior Nino PA-C, 3 Units at 10/13/24 2059    ipratropium-albuteroL (Duo-Neb) 0.5-2.5 mg/3 mL nebulizer solution 3 mL, 3 mL, nebulization, q2h PRN, Nan Santos PA-C, 3 mL at 10/10/24 2208    ipratropium-albuteroL (Duo-Neb) 0.5-2.5 mg/3 mL nebulizer solution 3 mL, 3 mL, nebulization, q4h, Uriah Gutierrez MD, 3 mL at 10/14/24 0649    levothyroxine (Synthroid, Levoxyl) tablet 100 mcg, 100 mcg, oral, Daily, Nan Santos PA-C, 100 mcg at 10/14/24 0545    lidocaine-epinephrine (Xylocaine W/EPI) 2 %-1:100,000 injection 3 mL, 3 mL, subcutaneous, Once PRN, René Kohli MD    losartan (Cozaar) tablet 50 mg, 50 mg, oral, Daily, René Kohli MD, 50 mg at 10/13/24 1017    [Held by provider] metFORMIN (Glucophage) tablet 500 mg, 500 mg, oral, BID, Nan Santos PA-C    methylPREDNISolone sod succinate (SOLU-Medrol) 40 mg/mL injection 40 mg, 40 mg, intravenous, q12h, Uriah Gutierrez MD, 40 mg at 10/13/24 2344    metoprolol succinate XL (Toprol-XL) 24 hr tablet 25 mg, 25 mg, oral, Daily, René Kohli MD, 25 mg at 10/13/24 1017    morphine injection 1 mg, 1 mg, intravenous, Once, Nan Santos PA-C    morphine injection 2 mg, 2 mg, intravenous, q6h PRN, René Kohli MD, 2 mg at 10/10/24 2133    morphine injection 2 mg, 2 mg, intravenous, Once, Nan Santos PA-C    nitroglycerin (Nitrostat) SL tablet 0.4 mg, 0.4 mg, sublingual, q5 min PRN, René Kohli MD    nystatin (Mycostatin) 100,000 unit/gram powder 1  Application, 1 Application, Topical, BID, Fredo Carlisle MD, 1 Application at 10/13/24 2043    oxygen (O2) therapy, , inhalation, Continuous PRN - O2/gases, René Kohli MD    oxygen (O2) therapy, , inhalation, Continuous PRN - O2/gases, Nan Santos PA-C    perflutren protein A microsphere (Optison) injection 0.5 mL, 0.5 mL, intravenous, Once in imaging, Nan Santos PA-C    piperacillin-tazobactam (Zosyn) 3.375 g in dextrose (iso) IV 50 mL, 3.375 g, intravenous, q6h, Uriah Gutierrez MD, Stopped at 10/14/24 0420    polyethylene glycol (Glycolax, Miralax) packet 17 g, 17 g, oral, Daily PRN, Nan Santos PA-C, 17 g at 10/12/24 1327    simvastatin (Zocor) tablet 40 mg, 40 mg, oral, Nightly, Nan Santos PA-C, 40 mg at 10/13/24 2042    sulfur hexafluoride microsphr (Lumason) injection 24.28 mg, 2 mL, intravenous, Once in imaging, Nan Santos PA-C    ticagrelor (Brilinta) tablet 90 mg, 90 mg, oral, BID, René Kohli MD, 90 mg at 10/13/24 2042    vancomycin (Vancocin) pharmacy to dose - pharmacy monitoring, , miscellaneous, Daily PRN, Uriah Gutierrez MD    vancomycin (Xellia) 1,250 mg in diluent combination  mL, 1,250 mg, intravenous, q12h, Uriah Gutierrez MD, Stopped at 10/14/24 0700    Physical Exam:    Heart Rate:  [81-99]   Temp:  [36 °C (96.8 °F)-36.7 °C (98.1 °F)]   Resp:  [19-37]   BP: ()/()   Weight:  [123 kg (271 lb 6.2 oz)]   SpO2:  [89 %-97 %]     Physical Exam  Constitutional:       General: She is in acute distress.      Appearance: Normal appearance.   HENT:      Head: Normocephalic and atraumatic.      Mouth/Throat:      Mouth: Mucous membranes are dry.   Eyes:      Pupils: Pupils are equal, round, and reactive to light.   Cardiovascular:      Rate and Rhythm: Regular rhythm. Tachycardia present.      Pulses: Normal pulses.      Heart sounds: Normal heart sounds.   Pulmonary:      Effort: Tachypnea and respiratory distress present.      Breath sounds:  Decreased breath sounds, wheezing and rales present.   Abdominal:      General: There is distension.      Palpations: Abdomen is soft.      Tenderness: There is no abdominal tenderness.   Musculoskeletal:         General: Swelling present.   Skin:     Findings: Rash present. Rash is crusting and papular.      Comments: Venous stasis dermatitis   Neurological:      General: No focal deficit present.      Mental Status: She is alert.     Review of Systems:  14 point review of systems was completed and negative except for those specially mention in my HPI    I have reviewed all medications, laboratory results, and imaging pertinent for today's encounter.    FiO2 (%):  [40 %] 40 %  S RR:  [18] 18      Intake/Output Summary (Last 24 hours) at 10/14/2024 0803  Last data filed at 10/14/2024 0800  Gross per 24 hour   Intake 1425.45 ml   Output 1451 ml   Net -25.55 ml            Assessment/Plan:    I am currently managing this critically ill patient for the following problems:    Neuro/Psych/Pain Ctrl/Sedation:  Anxiety  - Pain Management: tylenol  - CAM ICU  - Cont home lexapro     Respiratory/ENT:  Acute hypoxic resp failure 2/2 flash pulmonary edema  COPD  Asthma  R/O pneumonia  - Maintain SPO2 >92%  - Continuous pulse ox monitoring   - Pulm hygiene  - Duonebs q4h  - Cont Bipap at night and HFNC during day  - Solumedrol 40 q12h  - Empiric ATB     Cardiovascular:  STEMI - no occlusions on heart cath  CHF exacerbation  HTN  HLD  SVT  - Continuous cardiac monitoring per ICU protocol  - Maintain MAPS >65  - Daily EKGs prm  - Heart cath 10/10: lateral ST elevation myocardial infarction secondary to thrombus seen on cardiac catheterization distal diagonal. No indication for intervention since the lesion is very distally and there is ZAK I flow seen in the artery which is small overall for intervention. The remainder of her coronaries appears without obstructive coronary disease. Recommend aspirin Brilinta. High intensity  statin.   - Cont home losartan, statin, lasix and spirinolactone, ASA  - Start Brillinta  - BNP: 618  - Cardiology following  - Echo: EF 40-45%  - Venous duplex ordered     GI:  Morbid obesity  - Cardiac diet  - BR with miralax prn     Renal/Volume Status (Intra & Extravascular):  - Maintain randolph catheter  - Maintain urine output 0.5-1.0cc/kg/hr  - Monitor I/O's  - Replete electrolytes to maintain K >4.0 and Mg >2.0  - Daily BMP, Mg  - Cr: 0.75  - 80 lasix daily  - I/O: 0 stay : -6L    Endocrine  DMII  Hypothyroidism  - POCT ACHS ISS  - Cont home synthroid   - Monitor for hyper/hypoglycemia      Infectious Disease:  No fevers  - Monitor SIRS criteria  - WBC: 16.7 -> 18.6  - Zosyn (10/12- )  - MRSA neg, DC vanc  - Procal pending     Heme/Onc:  - Monitor for s/sx of anemia such as bleeding and bruising   - Transfuse if Hgb <7.0   - Daily CBC  - Hgb: 14.1  - Venous duplex ordered to R/O DVT     MSK:  - Padded pressure points   - PT/OT     Skin  - ICU skin protocol     Ethics/Code Status:  Full Code     :  DVT Prophylaxis: SQH  GI Prophylaxis: None  Bowel Regimen: Miralax prn  Diet: Cardiac  CVC: None  Dina: None  Randolph: Yes  Restraints: None  Dispo: ICU    Critical Care Time:  60 minutes spent in preparing to see patient (I.e.labs,imaging, etc.), documentation, discussion plan of care with patient/family/caregiver, and/ or coordination of care with multidisciplinary team including the attending. Time does not include completion of procedure time.     Nan Santos PA-C  Pulmonology & Critical Care Medicine   [facility]

## 2024-10-14 NOTE — PROGRESS NOTES
Spiritual Care Visit    Clinical Encounter Type  Visited With: Patient  Routine Visit: Follow-up  Continue Visiting: Yes         Values/Beliefs  Spiritual Requests During Hospitalization: Kay receiveed a blessing today. She isNPO.     Stephane Felder

## 2024-10-14 NOTE — PROGRESS NOTES
Patient not medically clear. Patient on high flow oxygen. At this time there is not a safe discharge plan in place. Will follow.      10/14/24 6155   Discharge Planning   Home or Post Acute Services Other (Comment)  (TBD)   Expected Discharge Disposition Othe  (TBD)   Does the patient need discharge transport arranged? No

## 2024-10-14 NOTE — CARE PLAN
"The patient's goals for the shift include \"get out of bed into a chair\"    The clinical goals for the shift include decrease oxygen as tolerate, remain hemodynamically stable    Over the shift, the patient made progress toward the following goals:       Problem: Safety - Adult  Goal: Free from fall injury  Outcome: Progressing  Flowsheets (Taken 10/14/2024 1840)  Free from fall injury: Instruct family/caregiver on patient safety     Problem: Discharge Planning  Goal: Discharge to home or other facility with appropriate resources  Outcome: Progressing  Flowsheets (Taken 10/14/2024 1840)  Discharge to home or other facility with appropriate resources:   Identify barriers to discharge with patient and caregiver   Arrange for needed discharge resources and transportation as appropriate   Identify discharge learning needs (meds, wound care, etc)   Arrange for interpreters to assist at discharge as needed   Refer to discharge planning if patient needs post-hospital services based on physician order or complex needs related to functional status, cognitive ability or social support system     Problem: Chronic Conditions and Co-morbidities  Goal: Patient's chronic conditions and co-morbidity symptoms are monitored and maintained or improved  Outcome: Progressing  Flowsheets (Taken 10/14/2024 1840)  Care Plan - Patient's Chronic Conditions and Co-Morbidity Symptoms are Monitored and Maintained or Improved:   Monitor and assess patient's chronic conditions and comorbid symptoms for stability, deterioration, or improvement   Collaborate with multidisciplinary team to address chronic and comorbid conditions and prevent exacerbation or deterioration   Update acute care plan with appropriate goals if chronic or comorbid symptoms are exacerbated and prevent overall improvement and discharge     Problem: Skin  Goal: Decreased wound size/increased tissue granulation at next dressing change  Outcome: Progressing  Flowsheets (Taken " 10/14/2024 1840)  Decreased wound size/increased tissue granulation at next dressing change:   Promote sleep for wound healing   Protective dressings over bony prominences   Utilize specialty bed per algorithm  Goal: Participates in plan/prevention/treatment measures  Outcome: Progressing  Flowsheets (Taken 10/14/2024 1840)  Participates in plan/prevention/treatment measures:   Discuss with provider PT/OT consult   Elevate heels   Increase activity/out of bed for meals  Goal: Prevent/manage excess moisture  Outcome: Progressing  Flowsheets (Taken 10/14/2024 1840)  Prevent/manage excess moisture:   Cleanse incontinence/protect with barrier cream   Follow provider orders for dressing changes   Moisturize dry skin   Monitor for/manage infection if present  Goal: Prevent/minimize sheer/friction injuries  Outcome: Progressing  Flowsheets (Taken 10/14/2024 1840)  Prevent/minimize sheer/friction injuries:   Complete micro-shifts as needed if patient unable. Adjust patient position to relieve pressure points, not a full turn   HOB 30 degrees or less   Increase activity/out of bed for meals   Turn/reposition every 2 hours/use positioning/transfer devices   Use pull sheet   Utilize specialty bed per algorithm  Goal: Promote/optimize nutrition  Outcome: Progressing  Flowsheets (Taken 10/14/2024 1840)  Promote/optimize nutrition:   Monitor/record intake including meals   Offer water/supplements/favorite foods   Discuss with provider if NPO > 2 days  Goal: Promote skin healing  Outcome: Progressing  Flowsheets (Taken 10/14/2024 1840)  Promote skin healing:   Assess skin/pad under line(s)/device(s)   Ensure correct size (line/device) and apply per  instructions   Protective dressings over bony prominences   Rotate device position/do not position patient on device   Turn/reposition every 2 hours/use positioning/transfer devices     Problem: Respiratory  Goal: Clear secretions with interventions this shift  Outcome:  Progressing  Flowsheets (Taken 10/14/2024 1840)  Clear secretions with interventions this shift:   Med administration/monitoring of effect   Encourage/provide pulmonary hygiene/secretion clearance   Suctioning  Goal: Minimize anxiety/maximize coping throughout shift  Outcome: Progressing  Flowsheets (Taken 10/14/2024 1840)  Minimize anxiety/maximize coping throughout shift:   Med administration/monitoring of effect   Monitor pain/anxiety level  Goal: Minimal/no exertional discomfort or dyspnea this shift  Outcome: Progressing  Flowsheets (Taken 10/14/2024 1840)  Minimal/no exertional discomfort or dyspnea this shift: Positioning to promote ventilation/comfort  Goal: No signs of respiratory distress (eg. Use of accessory muscles. Peds grunting)  Outcome: Progressing  Goal: Patent airway maintained this shift  Outcome: Progressing  Goal: Tolerate pulmonary toileting this shift  Outcome: Progressing  Flowsheets (Taken 10/14/2024 1840)  Tolerate pulmonary toileting this shift: Positioning to promote ventilation/comfort  Goal: Verbalize decreased shortness of breath this shift  Outcome: Progressing  Flowsheets (Taken 10/14/2024 1840)  Verbalize decreased shortness of breath this shift:   Encourage/provide pulmonary hygiene/secretion clearance   Suctioning  Goal: Wean oxygen to maintain O2 saturation per order/standard this shift  Outcome: Progressing  Flowsheets (Taken 10/14/2024 1840)  Wean oxygen to maintain O2 saturation per order/standard this shift: Encourage activity/mobility  Goal: Increase self care and/or family involvement in next 24 hours  Outcome: Progressing  Flowsheets (Taken 10/14/2024 1840)  Increase self care and/or family involvement in next 24 hours: Encourage activity/mobility     Problem: Bathing  Goal: LTG - Patient will utilize adaptive techniques to bathe body Min A  Outcome: Progressing     Problem: Dressings Lower Extremities  Goal: LTG - Patient will dress lower body Min A  Outcome:  Progressing     Problem: Dressing Upper Extremities  Goal: LTG - Patient will complete upper body dressing Mod I  Outcome: Progressing     Problem: Toileting  Goal: LTG - Patient will complete daily toileting tasks Min A  Outcome: Progressing     Problem: Pain  Goal: Takes deep breaths with improved pain control throughout the shift  Outcome: Progressing  Goal: Turns in bed with improved pain control throughout the shift  Outcome: Progressing  Goal: Performs ADL's with improved pain control throughout shift  Outcome: Progressing     Problem: Pain  Goal: Participates in PT with improved pain control throughout the shift  Outcome: Progressing -

## 2024-10-14 NOTE — PROGRESS NOTES
Vancomycin Dosing by Pharmacy- Cessation of Therapy    Consult to pharmacy for vancomycin dosing has been discontinued by the prescriber, pharmacy will sign off at this time.    Please call pharmacy if there are further questions or re-enter a consult if vancomycin is resumed.     Kenny Butler, SumanD

## 2024-10-14 NOTE — CARE PLAN
The patient's goals for the shift include Unable to assess, drowsy    The clinical goals for the shift include Patient will remain hemodynamically stable      Problem: Pain - Adult  Goal: Verbalizes/displays adequate comfort level or baseline comfort level  Outcome: Progressing  Flowsheets (Taken 10/13/2024 0332)  Verbalizes/displays adequate comfort level or baseline comfort level:   Encourage patient to monitor pain and request assistance   Assess pain using appropriate pain scale   Administer analgesics based on type and severity of pain and evaluate response   Implement non-pharmacological measures as appropriate and evaluate response   Consider cultural and social influences on pain and pain management   Notify Licensed Independent Practitioner if interventions unsuccessful or patient reports new pain     Problem: Safety - Adult  Goal: Free from fall injury  Outcome: Progressing     Problem: Discharge Planning  Goal: Discharge to home or other facility with appropriate resources  Outcome: Progressing  Flowsheets (Taken 10/13/2024 0332)  Discharge to home or other facility with appropriate resources:   Identify barriers to discharge with patient and caregiver   Arrange for needed discharge resources and transportation as appropriate   Identify discharge learning needs (meds, wound care, etc)   Arrange for interpreters to assist at discharge as needed   Refer to discharge planning if patient needs post-hospital services based on physician order or complex needs related to functional status, cognitive ability or social support system     Problem: Chronic Conditions and Co-morbidities  Goal: Patient's chronic conditions and co-morbidity symptoms are monitored and maintained or improved  Outcome: Progressing  Flowsheets (Taken 10/13/2024 0332)  Care Plan - Patient's Chronic Conditions and Co-Morbidity Symptoms are Monitored and Maintained or Improved:   Monitor and assess patient's chronic conditions and comorbid  symptoms for stability, deterioration, or improvement   Collaborate with multidisciplinary team to address chronic and comorbid conditions and prevent exacerbation or deterioration   Update acute care plan with appropriate goals if chronic or comorbid symptoms are exacerbated and prevent overall improvement and discharge     Problem: Skin  Goal: Decreased wound size/increased tissue granulation at next dressing change  Outcome: Progressing  Flowsheets (Taken 10/13/2024 1144 by Anjana Pitts RN)  Decreased wound size/increased tissue granulation at next dressing change: Protective dressings over bony prominences  Goal: Participates in plan/prevention/treatment measures  Outcome: Progressing  Flowsheets (Taken 10/13/2024 1144 by Anjana Pitts RN)  Participates in plan/prevention/treatment measures:   Elevate heels   Increase activity/out of bed for meals  Goal: Prevent/manage excess moisture  Outcome: Progressing  Flowsheets (Taken 10/13/2024 1144 by Anjana Pitts RN)  Prevent/manage excess moisture:   Moisturize dry skin   Cleanse incontinence/protect with barrier cream  Goal: Prevent/minimize sheer/friction injuries  Outcome: Progressing  Flowsheets (Taken 10/13/2024 1144 by Anjana Pitts RN)  Prevent/minimize sheer/friction injuries:   Use pull sheet   Turn/reposition every 2 hours/use positioning/transfer devices   HOB 30 degrees or less  Goal: Promote/optimize nutrition  Outcome: Progressing  Flowsheets (Taken 10/13/2024 1144 by Anjana Pitts RN)  Promote/optimize nutrition:   Monitor/record intake including meals   Offer water/supplements/favorite foods  Goal: Promote skin healing  Outcome: Progressing  Flowsheets (Taken 10/13/2024 1144 by Anjana Pitts RN)  Promote skin healing:   Turn/reposition every 2 hours/use positioning/transfer devices   Protective dressings over bony prominences   Assess skin/pad under line(s)/device(s)     Problem: Respiratory  Goal: Clear secretions  with interventions this shift  Outcome: Progressing  Flowsheets (Taken 10/13/2024 0332)  Clear secretions with interventions this shift: Suctioning  Goal: Minimize anxiety/maximize coping throughout shift  Outcome: Progressing  Flowsheets (Taken 10/13/2024 0332)  Minimize anxiety/maximize coping throughout shift: Monitor pain/anxiety level  Goal: Minimal/no exertional discomfort or dyspnea this shift  Outcome: Progressing  Goal: No signs of respiratory distress (eg. Use of accessory muscles. Peds grunting)  Outcome: Progressing  Goal: Patent airway maintained this shift  Outcome: Progressing  Goal: Tolerate mechanical ventilation evidenced by VS/agitation level this shift  Outcome: Progressing  Goal: Tolerate pulmonary toileting this shift  Outcome: Progressing  Goal: Verbalize decreased shortness of breath this shift  Outcome: Progressing  Goal: Wean oxygen to maintain O2 saturation per order/standard this shift  Outcome: Progressing  Goal: Increase self care and/or family involvement in next 24 hours  Outcome: Progressing     Problem: Bathing  Goal: LTG - Patient will utilize adaptive techniques to bathe body Min A  Outcome: Progressing     Problem: Dressings Lower Extremities  Goal: LTG - Patient will dress lower body Min A  Outcome: Progressing     Problem: Dressing Upper Extremities  Goal: LTG - Patient will complete upper body dressing Mod I  Outcome: Progressing     Problem: Toileting  Goal: LTG - Patient will complete daily toileting tasks Min A  Outcome: Progressing

## 2024-10-14 NOTE — PROGRESS NOTES
Kay Pike is a 59 y.o. female on day 4 of admission presenting with Shortness of breath.    Subjective   Alert and oriented, increased work of breathing noted.  On high flow nasal cannula oxygen.  No chest pain. now and a first diagnosed rapid atrial fibrillation       Objective     Physical Exam  Vitals reviewed.   Constitutional:       General: She is not in acute distress.     Appearance: Normal appearance. She is obese. She is ill-appearing. She is not toxic-appearing.   HENT:      Head: Normocephalic.      Nose: Nose normal.      Mouth/Throat:      Mouth: Mucous membranes are moist.      Pharynx: Oropharynx is clear.   Cardiovascular:      Rate and Rhythm: Tachycardia present. Rhythm irregular.      Pulses: Normal pulses.      Heart sounds: Normal heart sounds. No murmur heard.     No friction rub. No gallop.   Pulmonary:      Comments: Mild increased work of breathing noted.  Conversational dyspnea is appreciated.  Oxygen via high flow.  Rhonchorous breath sounds throughout the upper middle and lower right lobes as well as the lower left lobe.  No significant wheezing.  Abdominal:      General: Bowel sounds are normal.      Palpations: Abdomen is soft.   Genitourinary:     Comments: Mcnamara to CD with 200 mL doyle-yellow urine  Musculoskeletal:      Cervical back: Normal range of motion.      Right lower leg: Edema present.      Left lower leg: Edema present.      Comments: Chronic lymphedema to bilateral lower extremities with appearance of chronic cellulitis   Skin:     General: Skin is warm.      Capillary Refill: Capillary refill takes less than 2 seconds.   Neurological:      Mental Status: She is alert and oriented to person, place, and time. Mental status is at baseline.   Psychiatric:         Mood and Affect: Mood normal.         Behavior: Behavior normal.         Thought Content: Thought content normal.         Judgment: Judgment normal.         Last Recorded Vitals  Blood pressure (!) 159/126,  "pulse (!) 159, temperature 36.4 °C (97.5 °F), temperature source Oral, resp. rate (!) 39, height 1.575 m (5' 2.01\"), weight 123 kg (271 lb 6.2 oz), SpO2 94%.  Intake/Output last 3 Shifts:  I/O last 3 completed shifts:  In: 1802.1 (14.6 mL/kg) [P.O.:720; I.V.:32.1 (0.3 mL/kg); IV Piggyback:1050]  Out: 2950 (24 mL/kg) [Urine:2950 (0.7 mL/kg/hr)]  Weight: 123.1 kg     Relevant Results  Results for orders placed or performed during the hospital encounter of 10/10/24 (from the past 24 hour(s))   POCT GLUCOSE   Result Value Ref Range    POCT Glucose 148 (H) 74 - 99 mg/dL   POCT GLUCOSE   Result Value Ref Range    POCT Glucose 180 (H) 74 - 99 mg/dL   CBC and Auto Differential   Result Value Ref Range    WBC 18.6 (H) 4.4 - 11.3 x10*3/uL    nRBC 0.0 0.0 - 0.0 /100 WBCs    RBC 5.30 (H) 4.00 - 5.20 x10*6/uL    Hemoglobin 14.1 12.0 - 16.0 g/dL    Hematocrit 43.7 36.0 - 46.0 %    MCV 83 80 - 100 fL    MCH 26.6 26.0 - 34.0 pg    MCHC 32.3 32.0 - 36.0 g/dL    RDW 18.7 (H) 11.5 - 14.5 %    Platelets 192 150 - 450 x10*3/uL    Neutrophils % 90.5 40.0 - 80.0 %    Immature Granulocytes %, Automated 2.1 (H) 0.0 - 0.9 %    Lymphocytes % 4.7 13.0 - 44.0 %    Monocytes % 2.5 2.0 - 10.0 %    Eosinophils % 0.0 0.0 - 6.0 %    Basophils % 0.2 0.0 - 2.0 %    Neutrophils Absolute 16.81 (H) 1.20 - 7.70 x10*3/uL    Immature Granulocytes Absolute, Automated 0.39 0.00 - 0.70 x10*3/uL    Lymphocytes Absolute 0.88 (L) 1.20 - 4.80 x10*3/uL    Monocytes Absolute 0.47 0.10 - 1.00 x10*3/uL    Eosinophils Absolute 0.00 0.00 - 0.70 x10*3/uL    Basophils Absolute 0.04 0.00 - 0.10 x10*3/uL   Comprehensive Metabolic Panel   Result Value Ref Range    Glucose 163 (H) 74 - 99 mg/dL    Sodium 138 136 - 145 mmol/L    Potassium 4.3 3.5 - 5.3 mmol/L    Chloride 99 98 - 107 mmol/L    Bicarbonate 36 (H) 21 - 32 mmol/L    Anion Gap 7 (L) 10 - 20 mmol/L    Urea Nitrogen 36 (H) 6 - 23 mg/dL    Creatinine 0.75 0.50 - 1.05 mg/dL    eGFR >90 >60 mL/min/1.73m*2    Calcium 8.7 " 8.6 - 10.3 mg/dL    Albumin 3.2 (L) 3.4 - 5.0 g/dL    Alkaline Phosphatase 86 33 - 110 U/L    Total Protein 6.4 6.4 - 8.2 g/dL    AST 22 9 - 39 U/L    Bilirubin, Total 0.7 0.0 - 1.2 mg/dL    ALT 31 7 - 45 U/L   Magnesium   Result Value Ref Range    Magnesium 2.42 (H) 1.60 - 2.40 mg/dL   Phosphorus   Result Value Ref Range    Phosphorus 3.8 2.5 - 4.9 mg/dL   POCT GLUCOSE   Result Value Ref Range    POCT Glucose 177 (H) 74 - 99 mg/dL   Electrocardiogram, 12-lead PRN ACS symptoms   Result Value Ref Range    Ventricular Rate 161 BPM    Atrial Rate 166 BPM    AK Interval 200 ms    QRS Duration 118 ms    QT Interval 308 ms    QTC Calculation(Bazett) 504 ms    R Axis -81 degrees    T Axis 86 degrees    QRS Count 27 beats    Q Onset 213 ms    T Offset 367 ms    QTC Fredericia 428 ms   POCT GLUCOSE   Result Value Ref Range    POCT Glucose 184 (H) 74 - 99 mg/dL         Assessment/Plan   Assessment & Plan  Shortness of breath      1 lateral ST elevation myocardial infarction secondary to thrombus seen on cardiac catheterization distal diagonal 1.  No indication for intervention since the lesion is very distally and the artery diameter is small overall for intervention.  The remainder of her coronaries appears without obstructive coronary disease.  Recommend aspirin Brilinta for 12 months.  High intensity statin.  The echo showed ejection fraction of 45% with distal lateral apical anterior wall hypokinesis consistent with the territory of distal diagonal 1 which is almost dual system.     2.  Acute on chronic respiratory failure secondary to combination of COPD exacerbation and acute decompensated heart failure diastolic dysfunction.  We will decrease losartan to 50 mg oral daily.  Start on metoprolol succinate 25 mg oral daily.  Continue diuresis.  Will monitor.  Will start Farxiga 10 mg oral daily.      3.  Hypertension.  We will decrease losartan to 50 mg oral daily.  Start metoprolol succinate 25 mg oral daily.       4.   Hyperlipidemia continue high intensity statin.     5.  Morbid obesity.     Patient stable from my standpoint.  Recommend her to be on furosemide 80 mg oral daily, Farxiga 10 mg oral daily, aspirin 81 mg oral daily, Brilinta 90 mg oral twice daily, atorvastatin 80 mg oral daily, metoprolol succinate 50 mg oral daily, losartan 50 mg oral daily.  Follow-up in my office in 1 to 2 weeks.  Will sign off please call with any question    10/14: Be consulted for rapid heart rates initially suspected to be SVT.  Review of telemetry data as well as review of EKG in my opinion reveals a rapid atrial fibrillation with heart rates in the 160s.  Patient does not have a history of atrial fibrillation.  Most recent echocardiogram shows reduced ejection fraction of 40 to 45% with no significant atrial dilatation or abnormal valvular function.  Patient is currently experiencing an exacerbation of COPD.  She did undergo cardiac catheterization which revealed:  thrombus seen on cardiac catheterization distal diagonal 1.  No indication for intervention since the lesion is very distally and the artery diameter is small overall for intervention.  Since cardiac catheterization the intensivist team focused on fluid volume management as she appeared to be fluid overloaded on admission as well exacerbation of COPD.  On assessment today her lung sounds are rhonchorous throughout.  She has 2-3 word conversational dyspnea.  She remains with lymphedema to bilateral lower extremities.  At this point would continue with oral metoprolol, however would increase this to 25 mg p.o. twice daily.  Noting reduced ejection fraction of 40% would like to try to avoid diltiazem at this time.  Will utilize digoxin 500 mcg x 1 followed by 250 mcg x 1 4 hours later.  Would plan to start the patient on 125 mcg of digoxin tomorrow morning.  Concerning anticoagulation patient's [id number] equals 4.  Anticoagulation is generally recommended.  At this point she has  been placed on aspirin as well as ticagrelor after her recent cardiac catheterization with no stent placement.  Generally triple therapy is not recommended long term.  For today we will continue with her oral aspirin and Brilinta.  Will reevaluate tomorrow to assess burden of atrial fibrillation and at that point if she remains in atrial fibrillation we will likely stop aspirin and continue with ticagrelor and apixaban.  Time my assessment she does have rhonchorous breath sounds throughout.  She continues to receive IV furosemide which I agree with.  Will follow with you.       I spent 50 minutes in the professional and overall care of this patient.      Maikol Soriano, APRN-CNP

## 2024-10-14 NOTE — PROGRESS NOTES
Occupational Therapy    OT Treatment    Patient Name: Kay Pike  MRN: [id number]  Department: [facility] S ICU  Room: 11/11-A  Today's Date: 10/14/2024  Time Calculation  Start Time: 1002  Stop Time: 1029  Time Calculation (min): 27 min        Assessment:  OT Assessment: Pt is making progress towards established OT goals. Pt would benefit continued OT services to address deficits in ADLs, functional mobility, and transfers.  End of Session Communication: Bedside nurse  End of Session Patient Position: Up in chair, Alarm off, not on at start of session (all needs in reach, family present, RN aware)  OT Assessment Results: Decreased ADL status, Decreased endurance, Decreased functional mobility  Plan:  Treatment Interventions: ADL retraining, Functional transfer training, Endurance training, Patient/family training, Equipment evaluation/education, Compensatory technique education, Continued evaluation  OT Frequency: 4 times per week  OT Discharge Recommendations: Moderate intensity level of continued care  OT Recommended Transfer Status: Minimal assist, Assist of 1  OT - OK to Discharge: Yes  Treatment Interventions: ADL retraining, Functional transfer training, Endurance training, Patient/family training, Equipment evaluation/education, Compensatory technique education, Continued evaluation    Subjective   Previous Visit Info:  OT Last Visit  OT Received On: 10/14/24  General:  General  Family/Caregiver Present: Yes (sister and niece)  Co-Treatment: PT  Prior to Session Communication: Bedside nurse  Patient Position Received: Bed, 4 rail up, Alarm off, not on at start of session  General Comment: Cleared by nursing. Pt pleasant and agreeable to therapy  Precautions:  Hearing/Visual Limitations: reading glasses  Medical Precautions: Fall precautions, Oxygen therapy device and L/min (HFNC 40 lpm, 40% FiO2)    Vital Signs Comment: SpO2 96%; HR 90; /84 map 100     Pain:  Pain Assessment  Pain Assessment: 0-10  0-10  (Numeric) Pain Score: 0 - No pain    Objective    Cognition:  Cognition  Overall Cognitive Status: Within Functional Limits     Activities of Daily Living:      LE Dressing  LE Dressing: Yes  Sock Level of Assistance: Maximum assistance  LE Dressing Where Assessed: Bed level  LE Dressing Comments: donning/doffing socks       Bed Mobility/Transfers: Bed Mobility  Bed Mobility:  (min A for trunk upright supine>seated EOB with HOB elevated and use of bed rail)    Transfers  Transfer:  (CGA for balance sit>stand bed level, Min A for controlled descent stand>sit chair level, VCs for safe hand and leg placement)    Functional Mobility:  Functional Mobility  Functional Mobility Performed:  (CGA for balance/safety functional mobility ~4-5 steps bed>chair with use of RW)     Therapy/Activity: Therapeutic Exercise  Therapeutic Exercise Activity 1: Pt performed 10-15 reps x1 set BUE exercises (bicep curls, shouler flexion) to improve strength and indep with ADLs/functional transfers       Outcome Measures:[facility] Daily Activity  Putting on and taking off regular lower body clothing: A lot  Bathing (including washing, rinsing, drying): A lot  Putting on and taking off regular upper body clothing: A little  Toileting, which includes using toilet, bedpan or urinal: A lot  Taking care of personal grooming such as brushing teeth: A little  Eating Meals: A little  Daily Activity - Total Score: 15    Education Documentation  ADL Training, taught by Diane Hollis OT at 10/14/2024  2:21 PM.  Learner: Patient  Readiness: Acceptance  Method: Explanation, Demonstration  Response: Verbalizes Understanding, Needs Reinforcement    Education Comments  No comments found.      Goals:  Encounter Problems       Encounter Problems (Active)       Bathing       LTG - Patient will utilize adaptive techniques to bathe body Min A (Not Progressing)       Start:  10/12/24    Expected End:  11/09/24               Dressing Upper Extremities        LTG - Patient will complete upper body dressing Mod I (Progressing)       Start:  10/12/24    Expected End:  11/09/24               Dressings Lower Extremities       LTG - Patient will dress lower body Min A (Progressing)       Start:  10/12/24    Expected End:  11/09/24               Functional Mobility       Pt will demonstrate sitting/standing tolerance x 4 min, without significant inc in RR to complete ADL routine.  (Progressing)       Start:  10/12/24    Expected End:  11/09/24               Toileting       LTG - Patient will complete daily toileting tasks Min A (Not Progressing)       Start:  10/12/24    Expected End:  11/09/24

## 2024-10-14 NOTE — CARE PLAN
The patient's goals for the shift include Unable to assess, drowsy    The clinical goals for the shift include Patient will remain hemodynamically stable      Problem: Pain - Adult  Goal: Verbalizes/displays adequate comfort level or baseline comfort level  Outcome: Progressing  Flowsheets (Taken 10/13/2024 0332)  Verbalizes/displays adequate comfort level or baseline comfort level:   Encourage patient to monitor pain and request assistance   Assess pain using appropriate pain scale   Administer analgesics based on type and severity of pain and evaluate response   Implement non-pharmacological measures as appropriate and evaluate response   Consider cultural and social influences on pain and pain management   Notify Licensed Independent Practitioner if interventions unsuccessful or patient reports new pain     Problem: Safety - Adult  Goal: Free from fall injury  Outcome: Progressing     Problem: Discharge Planning  Goal: Discharge to home or other facility with appropriate resources  Outcome: Progressing  Flowsheets (Taken 10/13/2024 0332)  Discharge to home or other facility with appropriate resources:   Identify barriers to discharge with patient and caregiver   Arrange for needed discharge resources and transportation as appropriate   Identify discharge learning needs (meds, wound care, etc)   Arrange for interpreters to assist at discharge as needed   Refer to discharge planning if patient needs post-hospital services based on physician order or complex needs related to functional status, cognitive ability or social support system     Problem: Chronic Conditions and Co-morbidities  Goal: Patient's chronic conditions and co-morbidity symptoms are monitored and maintained or improved  Outcome: Progressing  Flowsheets (Taken 10/13/2024 0332)  Care Plan - Patient's Chronic Conditions and Co-Morbidity Symptoms are Monitored and Maintained or Improved:   Monitor and assess patient's chronic conditions and comorbid  symptoms for stability, deterioration, or improvement   Collaborate with multidisciplinary team to address chronic and comorbid conditions and prevent exacerbation or deterioration   Update acute care plan with appropriate goals if chronic or comorbid symptoms are exacerbated and prevent overall improvement and discharge     Problem: Skin  Goal: Decreased wound size/increased tissue granulation at next dressing change  10/14/2024 0234 by Vania Hobbs RN  Outcome: Progressing  Flowsheets (Taken 10/13/2024 1144 by Anjana Pitts RN)  Decreased wound size/increased tissue granulation at next dressing change: Protective dressings over bony prominences  10/14/2024 0233 by aVnia Hobbs RN  Outcome: Progressing  Flowsheets (Taken 10/13/2024 1144 by Anjana Pitts RN)  Decreased wound size/increased tissue granulation at next dressing change: Protective dressings over bony prominences  Goal: Participates in plan/prevention/treatment measures  10/14/2024 0234 by Vania Hobbs RN  Outcome: Progressing  Flowsheets (Taken 10/13/2024 1144 by Anjana Pitts RN)  Participates in plan/prevention/treatment measures:   Elevate heels   Increase activity/out of bed for meals  10/14/2024 0233 by Vania Hobbs RN  Outcome: Progressing  Flowsheets (Taken 10/13/2024 1144 by Anjana Pitts RN)  Participates in plan/prevention/treatment measures:   Elevate heels   Increase activity/out of bed for meals  Goal: Prevent/manage excess moisture  10/14/2024 0234 by Vania Hobbs RN  Outcome: Progressing  Flowsheets (Taken 10/13/2024 1144 by Anjana Pitts RN)  Prevent/manage excess moisture:   Moisturize dry skin   Cleanse incontinence/protect with barrier cream  10/14/2024 0233 by Vania Hobbs RN  Outcome: Progressing  Flowsheets (Taken 10/13/2024 1144 by Anjana Pitts RN)  Prevent/manage excess moisture:   Moisturize dry skin   Cleanse incontinence/protect with barrier cream  Goal: Prevent/minimize sheer/friction  injuries  10/14/2024 0234 by Vania Hobbs RN  Outcome: Progressing  Flowsheets (Taken 10/13/2024 1144 by Anjana Pitts RN)  Prevent/minimize sheer/friction injuries:   Use pull sheet   Turn/reposition every 2 hours/use positioning/transfer devices   HOB 30 degrees or less  10/14/2024 0233 by Vania Hobbs RN  Outcome: Progressing  Flowsheets (Taken 10/13/2024 1144 by Anjana Pitts RN)  Prevent/minimize sheer/friction injuries:   Use pull sheet   Turn/reposition every 2 hours/use positioning/transfer devices   HOB 30 degrees or less  Goal: Promote/optimize nutrition  10/14/2024 0234 by Vania Hobbs RN  Outcome: Progressing  Flowsheets (Taken 10/13/2024 1144 by Anjana Pitts RN)  Promote/optimize nutrition:   Monitor/record intake including meals   Offer water/supplements/favorite foods  10/14/2024 0233 by Vania Hobbs RN  Outcome: Progressing  Flowsheets (Taken 10/13/2024 1144 by Anjana Pitts RN)  Promote/optimize nutrition:   Monitor/record intake including meals   Offer water/supplements/favorite foods  Goal: Promote skin healing  10/14/2024 0234 by Vania Hobbs RN  Outcome: Progressing  Flowsheets (Taken 10/13/2024 1144 by Anjana Pitts RN)  Promote skin healing:   Turn/reposition every 2 hours/use positioning/transfer devices   Protective dressings over bony prominences   Assess skin/pad under line(s)/device(s)  10/14/2024 0233 by Vania Hobbs RN  Outcome: Progressing  Flowsheets (Taken 10/13/2024 1144 by Anjana Pitts RN)  Promote skin healing:   Turn/reposition every 2 hours/use positioning/transfer devices   Protective dressings over bony prominences   Assess skin/pad under line(s)/device(s)     Problem: Respiratory  Goal: Clear secretions with interventions this shift  Outcome: Progressing  Flowsheets (Taken 10/13/2024 0332)  Clear secretions with interventions this shift: Suctioning  Goal: Minimize anxiety/maximize coping throughout shift  Outcome: Progressing  Flowsheets  (Taken 10/13/2024 6521)  Minimize anxiety/maximize coping throughout shift: Monitor pain/anxiety level  Goal: Minimal/no exertional discomfort or dyspnea this shift  Outcome: Progressing  Goal: No signs of respiratory distress (eg. Use of accessory muscles. Peds grunting)  Outcome: Progressing  Goal: Patent airway maintained this shift  Outcome: Progressing  Goal: Tolerate mechanical ventilation evidenced by VS/agitation level this shift  Outcome: Progressing  Goal: Tolerate pulmonary toileting this shift  Outcome: Progressing  Goal: Verbalize decreased shortness of breath this shift  Outcome: Progressing  Goal: Wean oxygen to maintain O2 saturation per order/standard this shift  Outcome: Progressing  Goal: Increase self care and/or family involvement in next 24 hours  Outcome: Progressing     Problem: Bathing  Goal: LTG - Patient will utilize adaptive techniques to bathe body Min A  Outcome: Progressing     Problem: Dressings Lower Extremities  Goal: LTG - Patient will dress lower body Min A  Outcome: Progressing     Problem: Dressing Upper Extremities  Goal: LTG - Patient will complete upper body dressing Mod I  Outcome: Progressing     Problem: Toileting  Goal: LTG - Patient will complete daily toileting tasks Min A  Outcome: Progressing

## 2024-10-15 ENCOUNTER — APPOINTMENT (OUTPATIENT)
Dept: RADIOLOGY | Facility: HOSPITAL | Age: 59
DRG: 280 | End: 2024-10-15
Payer: COMMERCIAL

## 2024-10-15 LAB
ALBUMIN SERPL BCP-MCNC: 3.2 G/DL (ref 3.4–5)
ALP SERPL-CCNC: 74 U/L (ref 33–110)
ALT SERPL W P-5'-P-CCNC: 27 U/L (ref 7–45)
ANION GAP SERPL CALCULATED.3IONS-SCNC: 7 MMOL/L (ref 10–20)
ANION GAP SERPL CALCULATED.3IONS-SCNC: 7 MMOL/L (ref 10–20)
AST SERPL W P-5'-P-CCNC: 17 U/L (ref 9–39)
ATRIAL RATE: 166 BPM
BASOPHILS # BLD AUTO: 0.04 X10*3/UL (ref 0–0.1)
BASOPHILS NFR BLD AUTO: 0.3 %
BILIRUB SERPL-MCNC: 0.8 MG/DL (ref 0–1.2)
BUN SERPL-MCNC: 38 MG/DL (ref 6–23)
BUN SERPL-MCNC: 38 MG/DL (ref 6–23)
CALCIUM SERPL-MCNC: 8.7 MG/DL (ref 8.6–10.3)
CALCIUM SERPL-MCNC: 8.8 MG/DL (ref 8.6–10.3)
CHLORIDE SERPL-SCNC: 97 MMOL/L (ref 98–107)
CHLORIDE SERPL-SCNC: 98 MMOL/L (ref 98–107)
CO2 SERPL-SCNC: 37 MMOL/L (ref 21–32)
CO2 SERPL-SCNC: 39 MMOL/L (ref 21–32)
CREAT SERPL-MCNC: 0.74 MG/DL (ref 0.5–1.05)
CREAT SERPL-MCNC: 0.77 MG/DL (ref 0.5–1.05)
EGFRCR SERPLBLD CKD-EPI 2021: 90 ML/MIN/1.73M*2
EGFRCR SERPLBLD CKD-EPI 2021: >90 ML/MIN/1.73M*2
EOSINOPHIL # BLD AUTO: 0 X10*3/UL (ref 0–0.7)
EOSINOPHIL NFR BLD AUTO: 0 %
ERYTHROCYTE [DISTWIDTH] IN BLOOD BY AUTOMATED COUNT: 18.3 % (ref 11.5–14.5)
GLUCOSE BLD MANUAL STRIP-MCNC: 152 MG/DL (ref 74–99)
GLUCOSE BLD MANUAL STRIP-MCNC: 159 MG/DL (ref 74–99)
GLUCOSE BLD MANUAL STRIP-MCNC: 177 MG/DL (ref 74–99)
GLUCOSE BLD MANUAL STRIP-MCNC: 246 MG/DL (ref 74–99)
GLUCOSE SERPL-MCNC: 104 MG/DL (ref 74–99)
GLUCOSE SERPL-MCNC: 147 MG/DL (ref 74–99)
HCT VFR BLD AUTO: 46 % (ref 36–46)
HGB BLD-MCNC: 14.4 G/DL (ref 12–16)
IMM GRANULOCYTES # BLD AUTO: 0.23 X10*3/UL (ref 0–0.7)
IMM GRANULOCYTES NFR BLD AUTO: 1.5 % (ref 0–0.9)
LYMPHOCYTES # BLD AUTO: 0.73 X10*3/UL (ref 1.2–4.8)
LYMPHOCYTES NFR BLD AUTO: 4.7 %
MAGNESIUM SERPL-MCNC: 2.48 MG/DL (ref 1.6–2.4)
MCH RBC QN AUTO: 26.7 PG (ref 26–34)
MCHC RBC AUTO-ENTMCNC: 31.3 G/DL (ref 32–36)
MCV RBC AUTO: 85 FL (ref 80–100)
MONOCYTES # BLD AUTO: 0.34 X10*3/UL (ref 0.1–1)
MONOCYTES NFR BLD AUTO: 2.2 %
NEUTROPHILS # BLD AUTO: 14.27 X10*3/UL (ref 1.2–7.7)
NEUTROPHILS NFR BLD AUTO: 91.3 %
NRBC BLD-RTO: 0 /100 WBCS (ref 0–0)
PHOSPHATE SERPL-MCNC: 3.8 MG/DL (ref 2.5–4.9)
PLATELET # BLD AUTO: 188 X10*3/UL (ref 150–450)
POTASSIUM SERPL-SCNC: 4.8 MMOL/L (ref 3.5–5.3)
POTASSIUM SERPL-SCNC: 4.8 MMOL/L (ref 3.5–5.3)
PR INTERVAL: 200 MS
PROT SERPL-MCNC: 6.2 G/DL (ref 6.4–8.2)
Q ONSET: 213 MS
QRS COUNT: 27 BEATS
QRS DURATION: 118 MS
QT INTERVAL: 308 MS
QTC CALCULATION(BAZETT): 504 MS
QTC FREDERICIA: 428 MS
R AXIS: -81 DEGREES
RBC # BLD AUTO: 5.39 X10*6/UL (ref 4–5.2)
SODIUM SERPL-SCNC: 137 MMOL/L (ref 136–145)
SODIUM SERPL-SCNC: 138 MMOL/L (ref 136–145)
T AXIS: 86 DEGREES
T OFFSET: 367 MS
VENTRICULAR RATE: 161 BPM
WBC # BLD AUTO: 15.6 X10*3/UL (ref 4.4–11.3)

## 2024-10-15 PROCEDURE — 94664 DEMO&/EVAL PT USE INHALER: CPT

## 2024-10-15 PROCEDURE — 2060000001 HC INTERMEDIATE ICU ROOM DAILY

## 2024-10-15 PROCEDURE — 84100 ASSAY OF PHOSPHORUS: CPT

## 2024-10-15 PROCEDURE — 2500000004 HC RX 250 GENERAL PHARMACY W/ HCPCS (ALT 636 FOR OP/ED): Performed by: INTERNAL MEDICINE

## 2024-10-15 PROCEDURE — 2500000001 HC RX 250 WO HCPCS SELF ADMINISTERED DRUGS (ALT 637 FOR MEDICARE OP): Performed by: INTERNAL MEDICINE

## 2024-10-15 PROCEDURE — 2500000002 HC RX 250 W HCPCS SELF ADMINISTERED DRUGS (ALT 637 FOR MEDICARE OP, ALT 636 FOR OP/ED): Performed by: NURSE PRACTITIONER

## 2024-10-15 PROCEDURE — 36415 COLL VENOUS BLD VENIPUNCTURE: CPT

## 2024-10-15 PROCEDURE — 71045 X-RAY EXAM CHEST 1 VIEW: CPT

## 2024-10-15 PROCEDURE — 82947 ASSAY GLUCOSE BLOOD QUANT: CPT

## 2024-10-15 PROCEDURE — 2500000002 HC RX 250 W HCPCS SELF ADMINISTERED DRUGS (ALT 637 FOR MEDICARE OP, ALT 636 FOR OP/ED): Performed by: INTERNAL MEDICINE

## 2024-10-15 PROCEDURE — 83735 ASSAY OF MAGNESIUM: CPT

## 2024-10-15 PROCEDURE — 71045 X-RAY EXAM CHEST 1 VIEW: CPT | Performed by: RADIOLOGY

## 2024-10-15 PROCEDURE — 94640 AIRWAY INHALATION TREATMENT: CPT

## 2024-10-15 PROCEDURE — 99233 SBSQ HOSP IP/OBS HIGH 50: CPT | Performed by: NURSE PRACTITIONER

## 2024-10-15 PROCEDURE — 94660 CPAP INITIATION&MGMT: CPT

## 2024-10-15 PROCEDURE — 85025 COMPLETE CBC W/AUTO DIFF WBC: CPT

## 2024-10-15 PROCEDURE — 9420000001 HC RT PATIENT EDUCATION 5 MIN

## 2024-10-15 PROCEDURE — 2500000001 HC RX 250 WO HCPCS SELF ADMINISTERED DRUGS (ALT 637 FOR MEDICARE OP)

## 2024-10-15 PROCEDURE — 99291 CRITICAL CARE FIRST HOUR: CPT

## 2024-10-15 PROCEDURE — 2500000004 HC RX 250 GENERAL PHARMACY W/ HCPCS (ALT 636 FOR OP/ED)

## 2024-10-15 PROCEDURE — 80053 COMPREHEN METABOLIC PANEL: CPT

## 2024-10-15 PROCEDURE — 2500000002 HC RX 250 W HCPCS SELF ADMINISTERED DRUGS (ALT 637 FOR MEDICARE OP, ALT 636 FOR OP/ED)

## 2024-10-15 PROCEDURE — 84075 ASSAY ALKALINE PHOSPHATASE: CPT

## 2024-10-15 PROCEDURE — 2500000001 HC RX 250 WO HCPCS SELF ADMINISTERED DRUGS (ALT 637 FOR MEDICARE OP): Performed by: NURSE PRACTITIONER

## 2024-10-15 RX ORDER — FUROSEMIDE 10 MG/ML
40 INJECTION INTRAMUSCULAR; INTRAVENOUS ONCE
Status: COMPLETED | OUTPATIENT
Start: 2024-10-15 | End: 2024-10-15

## 2024-10-15 RX ADMIN — IPRATROPIUM BROMIDE AND ALBUTEROL SULFATE 3 ML: 2.5; .5 SOLUTION RESPIRATORY (INHALATION) at 07:25

## 2024-10-15 RX ADMIN — DAPAGLIFLOZIN 10 MG: 10 TABLET, FILM COATED ORAL at 08:57

## 2024-10-15 RX ADMIN — IPRATROPIUM BROMIDE AND ALBUTEROL SULFATE 3 ML: 2.5; .5 SOLUTION RESPIRATORY (INHALATION) at 11:05

## 2024-10-15 RX ADMIN — INSULIN LISPRO 3 UNITS: 100 INJECTION, SOLUTION INTRAVENOUS; SUBCUTANEOUS at 17:31

## 2024-10-15 RX ADMIN — IPRATROPIUM BROMIDE AND ALBUTEROL SULFATE 3 ML: 2.5; .5 SOLUTION RESPIRATORY (INHALATION) at 18:57

## 2024-10-15 RX ADMIN — TICAGRELOR 90 MG: 90 TABLET ORAL at 20:20

## 2024-10-15 RX ADMIN — METOPROLOL SUCCINATE 25 MG: 25 TABLET, FILM COATED, EXTENDED RELEASE ORAL at 20:20

## 2024-10-15 RX ADMIN — NYSTATIN 1 APPLICATION: 100000 POWDER TOPICAL at 20:26

## 2024-10-15 RX ADMIN — LEVOTHYROXINE SODIUM 100 MCG: 0.1 TABLET ORAL at 05:49

## 2024-10-15 RX ADMIN — IPRATROPIUM BROMIDE AND ALBUTEROL SULFATE 3 ML: 2.5; .5 SOLUTION RESPIRATORY (INHALATION) at 03:55

## 2024-10-15 RX ADMIN — NYSTATIN 1 APPLICATION: 100000 POWDER TOPICAL at 08:58

## 2024-10-15 RX ADMIN — INSULIN LISPRO 6 UNITS: 100 INJECTION, SOLUTION INTRAVENOUS; SUBCUTANEOUS at 20:23

## 2024-10-15 RX ADMIN — ESCITALOPRAM OXALATE 10 MG: 10 TABLET ORAL at 08:57

## 2024-10-15 RX ADMIN — SIMVASTATIN 40 MG: 40 TABLET, FILM COATED ORAL at 20:20

## 2024-10-15 RX ADMIN — PIPERACILLIN SODIUM AND TAZOBACTAM SODIUM 3.38 G: 3; .375 INJECTION, SOLUTION INTRAVENOUS at 20:20

## 2024-10-15 RX ADMIN — FUROSEMIDE 40 MG: 10 INJECTION, SOLUTION INTRAMUSCULAR; INTRAVENOUS at 15:49

## 2024-10-15 RX ADMIN — FUROSEMIDE 80 MG: 10 INJECTION, SOLUTION INTRAMUSCULAR; INTRAVENOUS at 08:57

## 2024-10-15 RX ADMIN — IPRATROPIUM BROMIDE AND ALBUTEROL SULFATE 3 ML: 2.5; .5 SOLUTION RESPIRATORY (INHALATION) at 22:44

## 2024-10-15 RX ADMIN — METOPROLOL SUCCINATE 25 MG: 25 TABLET, FILM COATED, EXTENDED RELEASE ORAL at 08:57

## 2024-10-15 RX ADMIN — PIPERACILLIN SODIUM AND TAZOBACTAM SODIUM 3.38 G: 3; .375 INJECTION, SOLUTION INTRAVENOUS at 08:57

## 2024-10-15 RX ADMIN — RIVAROXABAN 20 MG: 20 TABLET, FILM COATED ORAL at 17:33

## 2024-10-15 RX ADMIN — IPRATROPIUM BROMIDE AND ALBUTEROL SULFATE 3 ML: 2.5; .5 SOLUTION RESPIRATORY (INHALATION) at 15:10

## 2024-10-15 RX ADMIN — METHYLPREDNISOLONE SODIUM SUCCINATE 40 MG: 40 INJECTION, POWDER, FOR SOLUTION INTRAMUSCULAR; INTRAVENOUS at 11:24

## 2024-10-15 RX ADMIN — INSULIN LISPRO 3 UNITS: 100 INJECTION, SOLUTION INTRAVENOUS; SUBCUTANEOUS at 08:50

## 2024-10-15 RX ADMIN — METHYLPREDNISOLONE SODIUM SUCCINATE 40 MG: 40 INJECTION, POWDER, FOR SOLUTION INTRAMUSCULAR; INTRAVENOUS at 23:33

## 2024-10-15 RX ADMIN — PIPERACILLIN SODIUM AND TAZOBACTAM SODIUM 3.38 G: 3; .375 INJECTION, SOLUTION INTRAVENOUS at 14:24

## 2024-10-15 RX ADMIN — INSULIN LISPRO 3 UNITS: 100 INJECTION, SOLUTION INTRAVENOUS; SUBCUTANEOUS at 11:53

## 2024-10-15 RX ADMIN — PIPERACILLIN SODIUM AND TAZOBACTAM SODIUM 3.38 G: 3; .375 INJECTION, SOLUTION INTRAVENOUS at 03:00

## 2024-10-15 RX ADMIN — LOSARTAN POTASSIUM 50 MG: 50 TABLET, FILM COATED ORAL at 08:57

## 2024-10-15 RX ADMIN — HEPARIN SODIUM 7500 UNITS: 5000 INJECTION, SOLUTION INTRAVENOUS; SUBCUTANEOUS at 05:49

## 2024-10-15 RX ADMIN — TICAGRELOR 90 MG: 90 TABLET ORAL at 08:57

## 2024-10-15 ASSESSMENT — PAIN SCALES - GENERAL
PAINLEVEL_OUTOF10: 0 - NO PAIN
PAINLEVEL_OUTOF10: 4

## 2024-10-15 ASSESSMENT — COGNITIVE AND FUNCTIONAL STATUS - GENERAL
MOBILITY SCORE: 13
CLIMB 3 TO 5 STEPS WITH RAILING: TOTAL
MOVING TO AND FROM BED TO CHAIR: A LITTLE
MOVING FROM LYING ON BACK TO SITTING ON SIDE OF FLAT BED WITH BEDRAILS: A LITTLE
TURNING FROM BACK TO SIDE WHILE IN FLAT BAD: A LOT
STANDING UP FROM CHAIR USING ARMS: A LITTLE
WALKING IN HOSPITAL ROOM: TOTAL

## 2024-10-15 ASSESSMENT — PAIN - FUNCTIONAL ASSESSMENT
PAIN_FUNCTIONAL_ASSESSMENT: 0-10
PAIN_FUNCTIONAL_ASSESSMENT: FLACC (FACE, LEGS, ACTIVITY, CRY, CONSOLABILITY)

## 2024-10-15 ASSESSMENT — PAIN SCALES - WONG BAKER
WONGBAKER_NUMERICALRESPONSE: NO HURT
WONGBAKER_NUMERICALRESPONSE: NO HURT

## 2024-10-15 NOTE — PROGRESS NOTES
Physical Therapy    Physical Therapy Treatment    Patient Name: Kay Pike  MRN: [id number]  Department: [facility] S ICU  Room: 11/11-A  Today's Date: 10/15/2024  Time Calculation  Start Time: 1305  Stop Time: 1328  Time Calculation (min): 23 min         Assessment/Plan   PT Assessment  End of Session Communication: Bedside nurse  Assessment Comment: The pt made adequate progress toward her functional mobility goals. Pt required minimally increased assist during LIMITED functional mobility compared to her reported baseline. Pt would benefit from continued skilled PT services for maximizing independence and safety prior to & after discharge (MODERATE intensity).  End of Session Patient Position: Up in chair, Alarm off, not on at start of session (call light & tray table within reach. Sister & TCC in room.)     PT Plan  Treatment/Interventions: Bed mobility, Transfer training, Gait training, Strengthening, Therapeutic exercise, Therapeutic activity, Balance training, Endurance training  PT Plan: Ongoing PT  PT Frequency: 6 times per week  PT Discharge Recommendations: Moderate intensity level of continued care  Equipment Recommended upon Discharge: Other (comment) (if homegoing, consider Jr FWW)  PT Recommended Transfer Status: Assist x1, Assistive device (Jr FWW)  PT - OK to Discharge: Yes      General Visit Information:   PT  Visit  PT Received On: 10/15/24  Response to Previous Treatment: Patient with no complaints from previous session.  General  Reason for Referral: Impaired functional mobility. Pt admitted for acute on chronic respiratory failure due to COPD & CHF exacerbations and NSTEMI.  Past Medical History Relevant to Rehab: anxiety, asthma, COPD, DM-II, edema (BLE), morbid obesity, VLADIMIR, current tobacco smoker (> 1 PPD).  Family/Caregiver Present: Yes (sister who quietly observed)  Prior to Session Communication: Bedside nurse  Patient Position Received: Bed, 3 rail up, Alarm off, not on at start of session  (RN in room)  General Comment: RN cleared pt for participation. Pt agreed to tx and was fully engaged throughout.    Subjective   Precautions:  Precautions  Medical Precautions: Fall precautions, Oxygen therapy device and L/min  Precautions Comment: HFNC 40 lpm, 40% FiO2 (& RT increased to 80% toward end of session). Naima. Telemetry.    Vital Signs (Past 2hrs)        Date/Time Vitals Session Patient Position Pulse Resp SpO2 BP MAP (mmHg)    10/15/24 1300 --  --  79  27  93 %  144/45  75     10/15/24 1305 Pre PT  --  76  --  96 %  --  --                   Vital Signs Comment: Pre PT in supine with HOB elevated. Seated at rest in bedside chair (40% FiO2): SpO2 85-89% with rare 90% after pursed lip breathing. Seated in chair after tx (80% FiO2): SpO2 86% & HR 82.     Objective   Pain:  Pain Assessment  0-10 (Numeric) Pain Score: 4  Pain Location: Coccyx (& buttocks. RN aware and provide waffle chair cushion in prep for bed>chair transfer,)  Pain Interventions: Repositioned (chair cushion/pad)    Cognition:  Cognition  Overall Cognitive Status: Within Functional Limits (to participate in tx)    Postural Control:  Static Sitting Balance   (BUE supported: close S)    Static Standing Balance  (BUE supported: CGA)    Dynamic Standing Balance  (BUE supported on FWW: CGA for balance & Ron for walker mgmt.)    Activity Tolerance:  Endurance: Tolerated less than 5 min exercise/activity with changes in vital signs. Pt on significant supplemental O2. RN & RT aware.    Treatments:  Therapeutic Exercise  Cued for slower pacing, stopping at end range, and rest breaks between each exercise. In supported sitting, BLE:  -DF/PF x10  -knee ext x5  -hip abd x5  -hip flex (march) x5.    Therapeutic Activity  Pt instructed in and performed pursed lip breathing (PLB) after each mobility activity (5-10 reps, 3-4 sets). Issued hand out of PLB instructions. Educated pt in energy/oxygen conservation strategies during functional mobility, such  as slower pacing &  verbalizations. Instructed her in benefits of FWW for oxygen/energy conservation.    Bed Mobility  Bed Mobility: Yes  Bed Mobility 1  Bed Mobility 1: Supine to sitting  Level of Assistance 1: Minimum assistance (for trunk elevation via leverage at RUE. Minimal VCs for sequencing.)  Bed Mobility Comments 1: HOB elevated </=40° & used L bed rail  Bed Mobility 2  Bed Mobility  2: Scooting (fwd while seated EOB)  Level of Assistance 2: Close supervision (used BUE)      Transfers  Transfer: Yes  Transfer 1  Technique 1: Sit to stand  Transfer Device 1: Walker (FWW; elevated EOB due to pt's stature --OR-- bilateral armrests)  Transfer Level of Assistance 1: Contact guard  Trials/Comments 1: x1 trial each: EOB & bedside chair  Transfers 2  Technique 2: Stand to sit  Transfer Device 2: Walker (FWW; bilateral armrests)  Transfer Level of Assistance 2: Contact guard (Moderate VCs for walker safety/BUE placement and increased control of descent. LUE remained on walker during 2nd trial.)  Transfers 3  Technique 3: Stand pivot, To left (EOB>chair)  Transfer Device 3: Walker  Transfer Level of Assistance 3: Minimum assistance (intermittently for walker management)      Ambulation/Gait Training  Ambulation/Gait Training Performed: Yes  Ambulation/Gait Training 1  Surface 1: Level tile  Device 1: Rolling walker  Assistance 1: Minimum assistance (for intermittent walker management during retro steping otherwise CGA at trunk. Minimal verbal cues for walker sequencing with good follow through.)  Quality of Gait 1: Decreased step length (bilaterally (step through pattern). Slow velocity. No threat for LOB.)  Comments/Distance (ft) 1: </=2 ft fwd then retro.      Outcome Measures:  [facility] Basic Mobility  Turning from your back to your side while in a flat bed without using bedrails: A little  Moving from lying on your back to sitting on the side of a flat bed without using bedrails: A lot  Moving to and from  bed to chair (including a wheelchair): A little  Standing up from a chair using your arms (e.g. wheelchair or bedside chair): A little  To walk in hospital room: Total  Climbing 3-5 steps with railing: Total  Basic Mobility - Total Score: 13    FSS-ICU  Ambulation: Walks <50 feet with any assistance x1 or walks any distance with assistance x2 people  Rolling: Minimal assistance (performs 75% or more of task)  Sitting: Supervision or set-up only  Transfer Sit-to-Stand: Minimal assistance (performs 75% or more of task)  Transfer Supine-to-Sit: Moderate assistance (performs 50 - 74% of task)  Total Score: 17    Education Documentation  Mobility Training, taught by Yas Loja, PT at 10/15/2024  2:26 PM.  Learner: Patient  Readiness: Acceptance  Method: Explanation, Demonstration, Handout  Response: Needs Reinforcement, Verbalizes Understanding  Comment: Hand out of pursed lip breathing technique.    Education Comments  No comments found.           Encounter Problems       Encounter Problems (Active)       PT Problem       Pt will transition supine<>sit using hospital bed with mod I.  (Progressing)       Start:  10/14/24    Expected End:  11/10/24            Pt will transfer sit<>stand with FWW & mod I.  (Progressing)       Start:  10/14/24    Expected End:  11/10/24            Pt will ambulate >/=50 ft with FWW & mod I.  (Progressing)       Start:  10/14/24    Expected End:  11/10/24            Pt will demonstrate oxygen conservation strategies (eg, pursed lip breathing technique, no verbalization, etc) during exertive functional mobility/activities with at most 1 verbal cue (distant S). (Progressing)       Start:  10/14/24    Expected End:  11/10/24

## 2024-10-15 NOTE — PROGRESS NOTES
Patient not medically clear. Patient on high flow oxygen. TCC met with patient and her sister and they are requesting information on assistance for utilities and disability. TCC to gather resources for patient. At this time there is not a safe discharge plan in place. Will follow.      10/15/24 6632   Discharge Planning   Home or Post Acute Services Other (Comment)  (TBD)   Expected Discharge Disposition Othe  (TBD)   Does the patient need discharge transport arranged? No

## 2024-10-15 NOTE — PROGRESS NOTES
Critical Care Medicine       Date:  10/15/2024  Patient:  Kay Pike  YOB: 1965  MRN:  [id number]   Admit Date:  10/10/2024      Chief Complaint   Patient presents with    Shortness of Breath     Called ems for SOB. Recently got over cellulitis and on antibiotics. Patient states no chest pain just difficulty breathing. Patient given duoneb  and solumedrol.         History of Present Illness:  Kay Pike is a 59 y.o. year old female patient with Past Medical History of  DMII, hypothroidism, bilateral leg edema, VLADIMIR, HLD, anxiety, asthma and COPD presented to the ER for SOB that has been going on the past week. EMS was called today and she did receive steroids and breathing treatment. She reports that her symptoms have somewhat improved. She does not have any chest pain. Just finished doxy for right leg cellulitis. While in the ER and EKG was obtained showing 1 mm ST elevation lateral leads with reciprocal ST depression inferior leads. No previous EKG. Code STEMI was called and Dr. Kohli took the patient to the cath lab. No intervention was needed.      Interval ICU Events:  10/10: When she arrived to ICU she was tachynpenic in the 50's, increased WOB, tachycardiac . /70 and believe she had flash pulmonary edema. We gave 3 morphine, 40 lasix, cont Bipap, NTG paste. Will obtain ABG.     10/14: Bipap at night and HFNC throughout day. Cont zosyn will DC vanc due to neg MRSA. Patient went into an episode of SVT rates in 150's. 5 of metop given.     10/15: Patient converted to NSR at 8pm yesterday, cont IV dig. Cont zosyn. Cont Bipap at night and HFNC throughout day. If stable through afternoon will transfer to step down.     Objective     No past medical history on file.  Past Surgical History:   Procedure Laterality Date    CARDIAC CATHETERIZATION N/A 10/10/2024    Procedure: Left Heart Cath, No LV;  Surgeon: René Kohli MD;  Location: [facility] Cardiac [facility];  Service: Cardiovascular;   Laterality: N/A;     Medications Prior to Admission   Medication Sig Dispense Refill Last Dose    albuterol 90 mcg/actuation inhaler Inhale 2 puffs every 6 hours if needed for wheezing. 25.5 g 0 10/10/2024    escitalopram (Lexapro) 10 mg tablet Take 1 tablet (10 mg) by mouth once daily. 90 tablet 0 10/10/2024    fluticasone propion-salmeteroL (Advair Diskus) 500-50 mcg/dose diskus inhaler Inhale 1 puff 2 times a day. 60 each 3 10/10/2024    furosemide (Lasix) 40 mg tablet Take 1 tablet (40 mg) by mouth 2 times a day. 90 tablet 1 10/10/2024    apremilast (Otezla Starter) 10 mg (4)-20 mg (4)-30 mg (47) tablets,dose pack tablet therapy pack Take 1 tablet by mouth 2 times a day for 28 days. Do not crush, chew, or split tablets. 55 each 0     [] doxycycline (Vibramycin) 100 mg capsule Take 1 capsule (100 mg) by mouth 2 times a day for 21 days. Take with at least 8 ounces (large glass) of water, do not lie down for 30 minutes after 42 capsule 0     levothyroxine (Synthroid, Levoxyl) 100 mcg tablet Take 1 tablet (100 mcg) by mouth once daily in the morning. Take before meals. Take on an empty stomach 90 tablet 1 Unknown    losartan (Cozaar) 50 mg tablet Take 1 tablet (50 mg) by mouth once daily. 90 tablet 3 Unknown    metFORMIN (Glucophage) 500 mg tablet Take 1 tablet (500 mg) by mouth 2 times daily (morning and late afternoon). 180 tablet 1 Unknown    [] methylPREDNISolone (Medrol Dospak) 4 mg tablets Take as directed on package. 21 tablet 0 Unknown    nebulizer accessories misc 1 Units if needed (SOB). 10 each 1 Unknown    nebulizer and compressor device 1 Dose if needed (SOB). 1 each 0 Unknown    potassium chloride ER (Micro-K) 10 mEq ER capsule Take 1 capsule (10 mEq) by mouth once daily. Do not crush or chew. 90 capsule 1 Unknown    silver sulfADIAZINE (Silvadene) 1 % cream Apply to affected area twice a day or with each dressing change. 400 g 1 Unknown    simvastatin (Zocor) 40 mg tablet Take 1 tablet  (40 mg) by mouth once every 24 hours. 90 tablet 1 Unknown    spironolactone (Aldactone) 100 mg tablet Take 1 tablet (100 mg) by mouth once daily. 90 tablet 1 Unknown    tirzepatide (Mounjaro) 2.5 mg/0.5 mL pen injector Inject 2.5 mg under the skin 1 (one) time per week. 2 mL 0 Unknown    tirzepatide (Mounjaro) 5 mg/0.5 mL pen injector Inject 5 mg under the skin 1 (one) time per week. 2 mL 2 Unknown    triamcinolone (Kenalog) 0.1 % cream Apply topically 2 times a day. Apply to affected area 1-2 times daily as needed. 454 g 3 Unknown     Patient has no known allergies.  Social History     Tobacco Use    Smoking status: Every Day     Types: Cigarettes    Smokeless tobacco: Never   Substance Use Topics    Alcohol use: Never    Drug use: Never     Family History   Problem Relation Name Age of Onset    Diabetes Mother      Diabetes Father         Hospital Medications:             Current Facility-Administered Medications:     acetaminophen (Tylenol) tablet 650 mg, 650 mg, oral, q6h PRN, René Kohli MD, 650 mg at 10/14/24 1244    dapagliflozin propanediol (Farxiga) tablet 10 mg, 10 mg, oral, Daily, René Kohli MD, 10 mg at 10/14/24 0826    dextrose 50 % injection 12.5 g, 12.5 g, intravenous, q15 min PRN, Nan Santos PA-C    dextrose 50 % injection 25 g, 25 g, intravenous, q15 min PRN, Nan Santos PA-C    escitalopram (Lexapro) tablet 10 mg, 10 mg, oral, Daily, Nan Santos PA-C, 10 mg at 10/14/24 0826    [Held by provider] fluticasone furoate-vilanteroL (Breo Ellipta) 200-25 mcg/dose inhaler 1 puff, 1 puff, inhalation, Daily, Nan Santos PA-C    furosemide (Lasix) injection 80 mg, 80 mg, intravenous, Daily, René Kohli MD, 80 mg at 10/14/24 0826    [Held by provider] furosemide (Lasix) tablet 40 mg, 40 mg, oral, BID, Nan Santos PA-C    glucagon (Glucagen) injection 1 mg, 1 mg, intramuscular, q15 min ERAN, Nan Santos PA-C    glucagon (Glucagen) injection 1 mg, 1 mg, intramuscular, q15 min  PRN, Nan Santos PA-C    hydrOXYzine HCL (Atarax) tablet 25 mg, 25 mg, oral, q6h PRN, ANA CRISTINA Mccollum, 25 mg at 10/13/24 2053    insulin lispro (HumaLOG) injection 0-15 Units, 0-15 Units, subcutaneous, Before meals & nightly, Lior Nino PA-C, 3 Units at 10/14/24 2112    ipratropium-albuteroL (Duo-Neb) 0.5-2.5 mg/3 mL nebulizer solution 3 mL, 3 mL, nebulization, q2h PRN, Nan Santos PA-C, 3 mL at 10/10/24 2208    ipratropium-albuteroL (Duo-Neb) 0.5-2.5 mg/3 mL nebulizer solution 3 mL, 3 mL, nebulization, q4h, Uriah Gutierrez MD, 3 mL at 10/15/24 0725    levothyroxine (Synthroid, Levoxyl) tablet 100 mcg, 100 mcg, oral, Daily, Nan Santos PA-C, 100 mcg at 10/15/24 0549    lidocaine-epinephrine (Xylocaine W/EPI) 2 %-1:100,000 injection 3 mL, 3 mL, subcutaneous, Once PRN, René oKhli MD    losartan (Cozaar) tablet 50 mg, 50 mg, oral, Daily, René Kohli MD, 50 mg at 10/14/24 0826    [Held by provider] metFORMIN (Glucophage) tablet 500 mg, 500 mg, oral, BID, Nan Santos PA-C    methylPREDNISolone sod succinate (SOLU-Medrol) 40 mg/mL injection 40 mg, 40 mg, intravenous, q12h, Uriha Gutierrez MD, 40 mg at 10/14/24 2333    metoprolol succinate XL (Toprol-XL) 24 hr tablet 25 mg, 25 mg, oral, BID, ANA CRISTINA Yuen, 25 mg at 10/14/24 2100    morphine injection 2 mg, 2 mg, intravenous, q6h PRN, René Kohli MD, 2 mg at 10/10/24 2133    nitroglycerin (Nitrostat) SL tablet 0.4 mg, 0.4 mg, sublingual, q5 min PRN, René Kohli MD    nystatin (Mycostatin) 100,000 unit/gram powder 1 Application, 1 Application, Topical, BID, Fredo Carlisle MD, 1 Application at 10/14/24 2100    oxygen (O2) therapy, , inhalation, Continuous PRN - O2/gases, René Kholi MD    oxygen (O2) therapy, , inhalation, Continuous PRN - O2/gases, Nan Santos PA-C    perflutren protein A microsphere (Optison) injection 0.5 mL, 0.5 mL, intravenous, Once in imaging, Nan Santos PA-C     piperacillin-tazobactam (Zosyn) 3.375 g in dextrose (iso) IV 50 mL, 3.375 g, intravenous, q6h, Uriah Gutierrez MD, Stopped at 10/15/24 0348    polyethylene glycol (Glycolax, Miralax) packet 17 g, 17 g, oral, Daily PRN, Nan Santos PA-C, 17 g at 10/12/24 1327    rivaroxaban (Xarelto) tablet 20 mg, 20 mg, oral, Daily with evening meal, Maikol Soriano, APRN-CNP    simvastatin (Zocor) tablet 40 mg, 40 mg, oral, Nightly, Nan Santos PA-C, 40 mg at 10/14/24 2101    sulfur hexafluoride microsphr (Lumason) injection 24.28 mg, 2 mL, intravenous, Once in imaging, Nan Santos PA-C    ticagrelor (Brilinta) tablet 90 mg, 90 mg, oral, BID, René Kohli MD, 90 mg at 10/14/24 2101    Physical Exam:    Heart Rate:  []   Temp:  [36.1 °C (97 °F)-37 °C (98.6 °F)]   Resp:  [16-41]   BP: ()/()   Weight:  [121 kg (267 lb 6.7 oz)]   SpO2:  [87 %-97 %]     Physical Exam  Constitutional:       General: She is in acute distress.      Appearance: Normal appearance.   HENT:      Head: Normocephalic and atraumatic.      Mouth/Throat:      Mouth: Mucous membranes are dry.   Eyes:      Pupils: Pupils are equal, round, and reactive to light.   Cardiovascular:      Rate and Rhythm: Regular rhythm. Tachycardia present.      Pulses: Normal pulses.      Heart sounds: Normal heart sounds.   Pulmonary:      Effort: Tachypnea and respiratory distress present.      Breath sounds: Decreased breath sounds, wheezing and rales present.   Abdominal:      General: There is distension.      Palpations: Abdomen is soft.      Tenderness: There is no abdominal tenderness.   Musculoskeletal:         General: Swelling present.   Skin:     Findings: Rash present. Rash is crusting and papular.      Comments: Venous stasis dermatitis   Neurological:      General: No focal deficit present.      Mental Status: She is alert.     Review of Systems:  14 point review of systems was completed and negative except for those specially  mention in my HPI    I have reviewed all medications, laboratory results, and imaging pertinent for today's encounter.    FiO2 (%):  [40 %-60 %] 40 %  S RR:  [18] 18      Intake/Output Summary (Last 24 hours) at 10/15/2024 0746  Last data filed at 10/15/2024 0600  Gross per 24 hour   Intake 680 ml   Output 3728 ml   Net -3048 ml            Assessment/Plan:    I am currently managing this critically ill patient for the following problems:    Neuro/Psych/Pain Ctrl/Sedation:  Anxiety  - Pain Management: tylenol  - CAM ICU  - Cont home lexapro     Respiratory/ENT:  Acute hypoxic resp failure 2/2 flash pulmonary edema  COPD  Asthma  R/O pneumonia  - Maintain SPO2 >92%  - Continuous pulse ox monitoring   - Pulm hygiene  - Duonebs q4h  - Cont Bipap at night and HFNC during day  - Solumedrol 40 q12h  - Empiric ATB     Cardiovascular:  STEMI - no occlusions on heart cath  CHF exacerbation  Afib RVR  HTN  HLD  - Continuous cardiac monitoring per ICU protocol  - Maintain MAPS >65  - Daily EKGs prm  - Heart cath 10/10: lateral ST elevation myocardial infarction secondary to thrombus seen on cardiac catheterization distal diagonal. No indication for intervention since the lesion is very distally and there is ZAK I flow seen in the artery which is small overall for intervention. The remainder of her coronaries appears without obstructive coronary disease. Recommend aspirin Brilinta. High intensity statin.   - Cont home losartan, statin, lasix and spirinolactone, ASA  - Start Brillinta  - BNP: 618  - Cardiology following  -- IV dig, started xarelto  - Echo: EF 40-45%  - Venous duplex ordered     GI:  Morbid obesity  - Cardiac diet  - BR with miralax prn     Renal/Volume Status (Intra & Extravascular):  - Maintain randolph catheter  - Maintain urine output 0.5-1.0cc/kg/hr  - Monitor I/O's  - Replete electrolytes to maintain K >4.0 and Mg >2.0  - Daily BMP, Mg  - Cr: 0.74  - 80 lasix daily  - I/O: -2.8 stay :  -9L    Endocrine  DMII  Hypothyroidism  - POCT ACHS ISS  - Cont home synthroid   - Monitor for hyper/hypoglycemia      Infectious Disease:  No fevers  - Monitor SIRS criteria  - WBC: 16.7 -> 18.6 -> 15.6  - Zosyn (10/12-10/17 )  - MRSA neg, DC vanc  - Procal: 0.55 -> 0.14     Heme/Onc:  - Monitor for s/sx of anemia such as bleeding and bruising   - Transfuse if Hgb <7.0   - Daily CBC  - Hgb: 14.4  - Venous duplex ordered to R/O DVT     MSK:  - Padded pressure points   - PT/OT     Skin  - ICU skin protocol     Ethics/Code Status:  Full Code     :  DVT Prophylaxis: Xarelto  GI Prophylaxis: None  Bowel Regimen: Miralax prn  Diet: Cardiac  CVC: None  Dina: None  Mcnamara: Yes  Restraints: None  Dispo: Step down transfer    Critical Care Time:  60 minutes spent in preparing to see patient (I.e.labs,imaging, etc.), documentation, discussion plan of care with patient/family/caregiver, and/ or coordination of care with multidisciplinary team including the attending. Time does not include completion of procedure time.     Nan Santos PA-C  Pulmonology & Critical Care Medicine   [facility]

## 2024-10-15 NOTE — PROGRESS NOTES
"Kay Pike is a 59 y.o. female on day 5 of admission presenting with Shortness of breath.    Subjective   Alert and oriented.  Denies complaints of chest pain or pressure palpitations or feeling rapid heart rate.  Converted to a sinus rhythm yesterday at approximately 6 PM        Objective     Physical Exam  Vitals and nursing note reviewed.   Constitutional:       General: She is not in acute distress.     Appearance: She is obese. She is ill-appearing. She is not toxic-appearing.   HENT:      Head: Normocephalic and atraumatic.      Nose: Nose normal.      Mouth/Throat:      Mouth: Mucous membranes are moist.      Pharynx: Oropharynx is clear.   Cardiovascular:      Rate and Rhythm: Normal rate and regular rhythm.      Pulses: Normal pulses.      Heart sounds: Normal heart sounds. No murmur heard.     No friction rub. No gallop.   Pulmonary:      Effort: Pulmonary effort is normal.      Breath sounds: Normal breath sounds.      Comments: Currently wearing BiPAP at time assessment.  No significant crackles or wheezing noted.  Abdominal:      General: Bowel sounds are normal.      Palpations: Abdomen is soft.   Musculoskeletal:      Cervical back: Normal range of motion.      Right lower leg: Edema present.      Left lower leg: Edema present.      Comments: Chronic lymphedema   Skin:     General: Skin is warm and dry.      Capillary Refill: Capillary refill takes less than 2 seconds.   Neurological:      Mental Status: She is alert and oriented to person, place, and time. Mental status is at baseline.   Psychiatric:         Mood and Affect: Mood normal.         Behavior: Behavior normal.         Thought Content: Thought content normal.         Judgment: Judgment normal.         Last Recorded Vitals  Blood pressure (!) 131/42, pulse 67, temperature 36.5 °C (97.7 °F), temperature source Axillary, resp. rate 20, height 1.575 m (5' 2.01\"), weight 121 kg (267 lb 6.7 oz), SpO2 94%.  Intake/Output last 3 Shifts:  I/O " last 3 completed shifts:  In: 1030 (8.5 mL/kg) [P.O.:480; IV Piggyback:550]  Out: 4465 (36.8 mL/kg) [Urine:4465 (1 mL/kg/hr)]  Weight: 121.3 kg     Relevant Results  Results for orders placed or performed during the hospital encounter of 10/10/24 (from the past 24 hour(s))   POCT GLUCOSE   Result Value Ref Range    POCT Glucose 177 (H) 74 - 99 mg/dL   Electrocardiogram, 12-lead PRN ACS symptoms   Result Value Ref Range    Ventricular Rate 161 BPM    Atrial Rate 166 BPM    [place] Interval 200 ms    QRS Duration 118 ms    QT Interval 308 ms    QTC Calculation(Bazett) 504 ms    R Axis -81 degrees    T Axis 86 degrees    QRS Count 27 beats    Q Onset 213 ms    T Offset 367 ms    QTC Fredericia 428 ms   POCT GLUCOSE   Result Value Ref Range    POCT Glucose 184 (H) 74 - 99 mg/dL   Procalcitonin   Result Value Ref Range    Procalcitonin 0.14 (H) <=0.07 ng/mL   POCT GLUCOSE   Result Value Ref Range    POCT Glucose 202 (H) 74 - 99 mg/dL   POCT GLUCOSE   Result Value Ref Range    POCT Glucose 189 (H) 74 - 99 mg/dL   Basic metabolic panel   Result Value Ref Range    Glucose 104 (H) 74 - 99 mg/dL    Sodium 137 136 - 145 mmol/L    Potassium 4.8 3.5 - 5.3 mmol/L    Chloride 98 98 - 107 mmol/L    Bicarbonate 37 (H) 21 - 32 mmol/L    Anion Gap 7 (L) 10 - 20 mmol/L    Urea Nitrogen 38 (H) 6 - 23 mg/dL    Creatinine 0.77 0.50 - 1.05 mg/dL    eGFR 90 >60 mL/min/1.73m*2    Calcium 8.7 8.6 - 10.3 mg/dL   CBC and Auto Differential   Result Value Ref Range    WBC 15.6 (H) 4.4 - 11.3 x10*3/uL    nRBC 0.0 0.0 - 0.0 /100 WBCs    RBC 5.39 (H) 4.00 - 5.20 x10*6/uL    Hemoglobin 14.4 12.0 - 16.0 g/dL    Hematocrit 46.0 36.0 - 46.0 %    MCV 85 80 - 100 fL    MCH 26.7 26.0 - 34.0 pg    MCHC 31.3 (L) 32.0 - 36.0 g/dL    RDW 18.3 (H) 11.5 - 14.5 %    Platelets 188 150 - 450 x10*3/uL    Neutrophils % 91.3 40.0 - 80.0 %    Immature Granulocytes %, Automated 1.5 (H) 0.0 - 0.9 %    Lymphocytes % 4.7 13.0 - 44.0 %    Monocytes % 2.2 2.0 - 10.0 %     Eosinophils % 0.0 0.0 - 6.0 %    Basophils % 0.3 0.0 - 2.0 %    Neutrophils Absolute 14.27 (H) 1.20 - 7.70 x10*3/uL    Immature Granulocytes Absolute, Automated 0.23 0.00 - 0.70 x10*3/uL    Lymphocytes Absolute 0.73 (L) 1.20 - 4.80 x10*3/uL    Monocytes Absolute 0.34 0.10 - 1.00 x10*3/uL    Eosinophils Absolute 0.00 0.00 - 0.70 x10*3/uL    Basophils Absolute 0.04 0.00 - 0.10 x10*3/uL   Comprehensive Metabolic Panel   Result Value Ref Range    Glucose 147 (H) 74 - 99 mg/dL    Sodium 138 136 - 145 mmol/L    Potassium 4.8 3.5 - 5.3 mmol/L    Chloride 97 (L) 98 - 107 mmol/L    Bicarbonate 39 (H) 21 - 32 mmol/L    Anion Gap 7 (L) 10 - 20 mmol/L    Urea Nitrogen 38 (H) 6 - 23 mg/dL    Creatinine 0.74 0.50 - 1.05 mg/dL    eGFR >90 >60 mL/min/1.73m*2    Calcium 8.8 8.6 - 10.3 mg/dL    Albumin 3.2 (L) 3.4 - 5.0 g/dL    Alkaline Phosphatase 74 33 - 110 U/L    Total Protein 6.2 (L) 6.4 - 8.2 g/dL    AST 17 9 - 39 U/L    Bilirubin, Total 0.8 0.0 - 1.2 mg/dL    ALT 27 7 - 45 U/L   Magnesium   Result Value Ref Range    Magnesium 2.48 (H) 1.60 - 2.40 mg/dL   Phosphorus   Result Value Ref Range    Phosphorus 3.8 2.5 - 4.9 mg/dL    t        Assessment/Plan   Assessment & Plan  Shortness of breath        1 lateral ST elevation myocardial infarction secondary to thrombus seen on cardiac catheterization distal diagonal 1.  No indication for intervention since the lesion is very distally and the artery diameter is small overall for intervention.  The remainder of her coronaries appears without obstructive coronary disease.  Recommend aspirin Brilinta for 12 months.  High intensity statin.  The echo showed ejection fraction of 45% with distal lateral apical anterior wall hypokinesis consistent with the territory of distal diagonal 1 which is almost dual system.     2.  Acute on chronic respiratory failure secondary to combination of COPD exacerbation and acute decompensated heart failure diastolic dysfunction.  We will decrease losartan  to 50 mg oral daily.  Start on metoprolol succinate 25 mg oral daily.  Continue diuresis.  Will monitor.  Will start Farxiga 10 mg oral daily.      3.  Hypertension.  We will decrease losartan to 50 mg oral daily.  Start metoprolol succinate 25 mg oral daily.       4.  Hyperlipidemia continue high intensity statin.     5.  Morbid obesity.     Patient stable from my standpoint.  Recommend her to be on furosemide 80 mg oral daily, Farxiga 10 mg oral daily, aspirin 81 mg oral daily, Brilinta 90 mg oral twice daily, atorvastatin 80 mg oral daily, metoprolol succinate 50 mg oral daily, losartan 50 mg oral daily.  Follow-up in my office in 1 to 2 weeks.  Will sign off please call with any question     10/14: Be consulted for rapid heart rates initially suspected to be SVT.  Review of telemetry data as well as review of EKG in my opinion reveals a rapid atrial fibrillation with heart rates in the 160s.  Patient does not have a history of atrial fibrillation.  Most recent echocardiogram shows reduced ejection fraction of 40 to 45% with no significant atrial dilatation or abnormal valvular function.  Patient is currently experiencing an exacerbation of COPD.  She did undergo cardiac catheterization which revealed:  thrombus seen on cardiac catheterization distal diagonal 1.  No indication for intervention since the lesion is very distally and the artery diameter is small overall for intervention.  Since cardiac catheterization the intensivist team focused on fluid volume management as she appeared to be fluid overloaded on admission as well exacerbation of COPD.  On assessment today her lung sounds are rhonchorous throughout.  She has 2-3 word conversational dyspnea.  She remains with lymphedema to bilateral lower extremities.  At this point would continue with oral metoprolol, however would increase this to 25 mg p.o. twice daily.  Noting reduced ejection fraction of 40% would like to try to avoid diltiazem at this time.   Will utilize digoxin 500 mcg x 1 followed by 250 mcg x 1 4 hours later.  Would plan to start the patient on 125 mcg of digoxin tomorrow morning.  Concerning anticoagulation patient's [id number] equals 4.  Anticoagulation is generally recommended.  At this point she has been placed on aspirin as well as ticagrelor after her recent cardiac catheterization with no stent placement.  Generally triple therapy is not recommended long term.  For today we will continue with her oral aspirin and Brilinta.  Will reevaluate tomorrow to assess burden of atrial fibrillation and at that point if she remains in atrial fibrillation we will likely stop aspirin and continue with ticagrelor and apixaban.  Time my assessment she does have rhonchorous breath sounds throughout.  She continues to receive IV furosemide which I agree with.  Will follow with you.     10/15: Improved over the past 24 hours.  Patient was given IV digoxin yesterday for  first diagnosed rapid atrial fibrillation.  This was very effective and the patient spontaneously converted back to a sinus rhythm at approximately 6 PM yesterday and has remained in a sinus rhythm since that time.  Additionally her beta-blocker was increased.  At this point we will trial discontinuation of digoxin and attempt to continue with beta-blocker alone.  Concerning anticoagulation as noted above [id number] equals 4.  Anticoagulation in this patient is recommended.  She is currently on aspirin as well as ticagrelor.  Will stop oral aspirin and start the patient on rivaroxaban for stroke risk reduction.  Lung sounds have significantly improved with BiPAP overnight.  Overall improving.  Would like to follow 1 day further.  Will follow with you.     I spent 50 minutes in the professional and overall care of this patient.      Maikol Soriano, APRN-CNP

## 2024-10-15 NOTE — CARE PLAN
Problem: Safety - Adult  Goal: Free from fall injury  Outcome: Progressing     Problem: Discharge Planning  Goal: Discharge to home or other facility with appropriate resources  Outcome: Progressing     Problem: Chronic Conditions and Co-morbidities  Goal: Patient's chronic conditions and co-morbidity symptoms are monitored and maintained or improved  Outcome: Progressing     Problem: Skin  Goal: Decreased wound size/increased tissue granulation at next dressing change  Outcome: Progressing  Flowsheets (Taken 10/15/2024 0227)  Decreased wound size/increased tissue granulation at next dressing change:   Promote sleep for wound healing   Protective dressings over bony prominences  Goal: Participates in plan/prevention/treatment measures  Outcome: Progressing  Flowsheets (Taken 10/15/2024 0227)  Participates in plan/prevention/treatment measures: Elevate heels  Goal: Prevent/manage excess moisture  Outcome: Progressing  Flowsheets (Taken 10/15/2024 0227)  Prevent/manage excess moisture:   Moisturize dry skin   Cleanse incontinence/protect with barrier cream   Monitor for/manage infection if present  Goal: Prevent/minimize sheer/friction injuries  Outcome: Progressing  Flowsheets (Taken 10/15/2024 0227)  Prevent/minimize sheer/friction injuries:   Complete micro-shifts as needed if patient unable. Adjust patient position to relieve pressure points, not a full turn   Use pull sheet   HOB 30 degrees or less   Turn/reposition every 2 hours/use positioning/transfer devices  Goal: Promote/optimize nutrition  Outcome: Progressing  Flowsheets (Taken 10/15/2024 0227)  Promote/optimize nutrition:   Offer water/supplements/favorite foods   Monitor/record intake including meals  Goal: Promote skin healing  Outcome: Progressing  Flowsheets (Taken 10/15/2024 0227)  Promote skin healing:   Turn/reposition every 2 hours/use positioning/transfer devices   Rotate device position/do not position patient on device   Protective dressings  "over bony prominences     Problem: Bathing  Goal: LTG - Patient will utilize adaptive techniques to bathe body Min A  Outcome: Progressing     Problem: Dressings Lower Extremities  Goal: LTG - Patient will dress lower body Min A  Outcome: Progressing     Problem: Dressing Upper Extremities  Goal: LTG - Patient will complete upper body dressing Mod I  Outcome: Progressing     Problem: Toileting  Goal: LTG - Patient will complete daily toileting tasks Min A  Outcome: Progressing     Problem: Pain  Goal: Takes deep breaths with improved pain control throughout the shift  Outcome: Progressing  Goal: Turns in bed with improved pain control throughout the shift  Outcome: Progressing  Goal: Performs ADL's with improved pain control throughout shift  Outcome: Progressing  Goal: Participates in PT with improved pain control throughout the shift  Outcome: Progressing   The patient's goals for the shift include \"get out of bed into a chair\"    The clinical goals for the shift include Continue to decrease supplemental O2 as tolerated    Over the shift, the patient did not make progress toward the following goals. Barriers to progression include. Recommendations to address these barriers include.    "

## 2024-10-15 NOTE — CONSULTS
Wound Care Consult     Visit Date: 10/15/2024      Patient Name: Kay Pike         MRN: [id number]           YOB: 1965     Reason for Consult: Wounds : Buttocks right ,   Skin tear of left medial dorsal  leg     Wound History: deferred to patient's chart     Pertinent Labs:   [place], Urine   Date Value Ref Range Status   02/18/2019 12 0 - 23 MG/L Final     Comment:     LESS THAN     Albumin   Date Value Ref Range Status   10/15/2024 3.2 (L) 3.4 - 5.0 g/dL Final       Wound Assessment:  Wound 10/12/24 Pressure Injury Buttock Right (Active)   Wound Image   10/12/24 0640   Site Assessment [place];Pink;Intact;Blanchable erythema 10/15/24 1000   Mi-Wound Assessment Blanchable erythema;Intact 10/15/24 1000   Non-staged Wound Description Not applicable 10/15/24 1000   Pressure Injury Stage 1 10/15/24 1000   Wound Length (cm) 2.8 cm 10/15/24 1000   Wound Width (cm) 1.6 cm 10/15/24 1000   Wound Surface Area (cm^2) 4.48 cm^2 10/15/24 1000   Wound Depth (cm) 0 cm 10/15/24 1000   Wound Volume (cm^3) 0 cm^3 10/15/24 1000   Dressing Foam 10/15/24 0800   Dressing Changed Changed 10/15/24 0800   Dressing Status Clean;Dry 10/15/24 0800       Wound 10/14/24 Skin Tear Leg Left;Medial;Dorsal (Active)   Site Assessment Epithelialization 10/14/24 1600   Drainage Description Serosanguineous 10/14/24 1600   Drainage Amount Moderate 10/14/24 1600   Dressing Open to air 10/15/24 0800   Dressing Changed New 10/14/24 1200   Dressing Status Clean;Dry;Occlusive 10/14/24 1200       Wound Team Summary Assessment: Wound care recommendations: Wash wounds with soap and water and pat dry,   Wound of buttocks, apply triad barrier cream and cover with foam  Wound of left medial dorsal leg ( skin tear) apply adaptic to wound bed ( single layer to wound bed only care taken to not have a significant amount of over lap to mi-wound intact skin), and cover with 4x4, roll gauze and ace wrap  Application of barrier cream moisturizer to  mi wound skin     Currently the Patient is on an appropriate pressure off-loading mattress .  Pad all bony prominences,   turn and reposition every 2 hours,  Provide measures to mitigate moisture associated skin break down to prone moisture areas. I.e intradry and barrier cream.  Promote patient ambulation and mobility as able.    Wound Team Plan: Follow up : provide support to patient and care staff in care and management of patient's wound care needs during term of this current admission     Lauri Ca RN  10/15/2024  10:51 AM

## 2024-10-15 NOTE — PROGRESS NOTES
Spiritual Care Visit    Clinical Encounter Type  Visited With: Patient  Routine Visit: Follow-up  Continue Visiting: Yes         Values/Beliefs  Spiritual Requests During Hospitalization: Galdino received Communon today.    Sacramental Encounters  Communion: Patient wants communion  Communion Given Indicator: Yes     Stephane Fleder

## 2024-10-16 PROBLEM — J20.9 COPD (CHRONIC OBSTRUCTIVE PULMONARY DISEASE) WITH ACUTE BRONCHITIS (MULTI): Status: ACTIVE | Noted: 2024-10-16

## 2024-10-16 PROBLEM — R73.9 HYPERGLYCEMIA: Status: ACTIVE | Noted: 2024-10-16

## 2024-10-16 PROBLEM — I50.20 SYSTOLIC HEART FAILURE SECONDARY TO CORONARY ARTERY DISEASE: Status: ACTIVE | Noted: 2024-10-16

## 2024-10-16 PROBLEM — I25.10 SYSTOLIC HEART FAILURE SECONDARY TO CORONARY ARTERY DISEASE: Status: ACTIVE | Noted: 2024-10-16

## 2024-10-16 PROBLEM — J44.0 COPD (CHRONIC OBSTRUCTIVE PULMONARY DISEASE) WITH ACUTE BRONCHITIS (MULTI): Status: ACTIVE | Noted: 2024-10-16

## 2024-10-16 LAB
ALBUMIN SERPL BCP-MCNC: 3.2 G/DL (ref 3.4–5)
ALP SERPL-CCNC: 69 U/L (ref 33–110)
ALT SERPL W P-5'-P-CCNC: 25 U/L (ref 7–45)
ANION GAP SERPL CALCULATED.3IONS-SCNC: 7 MMOL/L (ref 10–20)
AST SERPL W P-5'-P-CCNC: 13 U/L (ref 9–39)
BASOPHILS # BLD AUTO: 0.04 X10*3/UL (ref 0–0.1)
BASOPHILS NFR BLD AUTO: 0.2 %
BILIRUB SERPL-MCNC: 0.8 MG/DL (ref 0–1.2)
BUN SERPL-MCNC: 37 MG/DL (ref 6–23)
CALCIUM SERPL-MCNC: 8.9 MG/DL (ref 8.6–10.3)
CHLORIDE SERPL-SCNC: 93 MMOL/L (ref 98–107)
CO2 SERPL-SCNC: 42 MMOL/L (ref 21–32)
CREAT SERPL-MCNC: 0.83 MG/DL (ref 0.5–1.05)
EGFRCR SERPLBLD CKD-EPI 2021: 82 ML/MIN/1.73M*2
EOSINOPHIL # BLD AUTO: 0.02 X10*3/UL (ref 0–0.7)
EOSINOPHIL NFR BLD AUTO: 0.1 %
ERYTHROCYTE [DISTWIDTH] IN BLOOD BY AUTOMATED COUNT: 17.9 % (ref 11.5–14.5)
GLUCOSE BLD MANUAL STRIP-MCNC: 162 MG/DL (ref 74–99)
GLUCOSE BLD MANUAL STRIP-MCNC: 167 MG/DL (ref 74–99)
GLUCOSE BLD MANUAL STRIP-MCNC: 171 MG/DL (ref 74–99)
GLUCOSE BLD MANUAL STRIP-MCNC: 197 MG/DL (ref 74–99)
GLUCOSE SERPL-MCNC: 166 MG/DL (ref 74–99)
HCT VFR BLD AUTO: 46 % (ref 36–46)
HGB BLD-MCNC: 14.4 G/DL (ref 12–16)
IMM GRANULOCYTES # BLD AUTO: 0.27 X10*3/UL (ref 0–0.7)
IMM GRANULOCYTES NFR BLD AUTO: 1.6 % (ref 0–0.9)
LYMPHOCYTES # BLD AUTO: 0.68 X10*3/UL (ref 1.2–4.8)
LYMPHOCYTES NFR BLD AUTO: 4.1 %
MAGNESIUM SERPL-MCNC: 2.44 MG/DL (ref 1.6–2.4)
MCH RBC QN AUTO: 26.6 PG (ref 26–34)
MCHC RBC AUTO-ENTMCNC: 31.3 G/DL (ref 32–36)
MCV RBC AUTO: 85 FL (ref 80–100)
MONOCYTES # BLD AUTO: 0.46 X10*3/UL (ref 0.1–1)
MONOCYTES NFR BLD AUTO: 2.8 %
NEUTROPHILS # BLD AUTO: 15.1 X10*3/UL (ref 1.2–7.7)
NEUTROPHILS NFR BLD AUTO: 91.2 %
NRBC BLD-RTO: 0 /100 WBCS (ref 0–0)
PHOSPHATE SERPL-MCNC: 4.6 MG/DL (ref 2.5–4.9)
PLATELET # BLD AUTO: 187 X10*3/UL (ref 150–450)
POTASSIUM SERPL-SCNC: 4.5 MMOL/L (ref 3.5–5.3)
PROT SERPL-MCNC: 6.1 G/DL (ref 6.4–8.2)
RBC # BLD AUTO: 5.42 X10*6/UL (ref 4–5.2)
SODIUM SERPL-SCNC: 137 MMOL/L (ref 136–145)
WBC # BLD AUTO: 16.6 X10*3/UL (ref 4.4–11.3)

## 2024-10-16 PROCEDURE — 85025 COMPLETE CBC W/AUTO DIFF WBC: CPT

## 2024-10-16 PROCEDURE — 2500000004 HC RX 250 GENERAL PHARMACY W/ HCPCS (ALT 636 FOR OP/ED): Performed by: INTERNAL MEDICINE

## 2024-10-16 PROCEDURE — 97530 THERAPEUTIC ACTIVITIES: CPT | Mod: GP | Performed by: PHYSICAL THERAPIST

## 2024-10-16 PROCEDURE — 2500000001 HC RX 250 WO HCPCS SELF ADMINISTERED DRUGS (ALT 637 FOR MEDICARE OP)

## 2024-10-16 PROCEDURE — 2500000002 HC RX 250 W HCPCS SELF ADMINISTERED DRUGS (ALT 637 FOR MEDICARE OP, ALT 636 FOR OP/ED)

## 2024-10-16 PROCEDURE — 94640 AIRWAY INHALATION TREATMENT: CPT

## 2024-10-16 PROCEDURE — 84100 ASSAY OF PHOSPHORUS: CPT

## 2024-10-16 PROCEDURE — 99233 SBSQ HOSP IP/OBS HIGH 50: CPT | Performed by: INTERNAL MEDICINE

## 2024-10-16 PROCEDURE — 83036 HEMOGLOBIN GLYCOSYLATED A1C: CPT | Mod: WESLAB | Performed by: INTERNAL MEDICINE

## 2024-10-16 PROCEDURE — 83735 ASSAY OF MAGNESIUM: CPT

## 2024-10-16 PROCEDURE — 99291 CRITICAL CARE FIRST HOUR: CPT | Performed by: INTERNAL MEDICINE

## 2024-10-16 PROCEDURE — 97110 THERAPEUTIC EXERCISES: CPT | Mod: GP | Performed by: PHYSICAL THERAPIST

## 2024-10-16 PROCEDURE — 97535 SELF CARE MNGMENT TRAINING: CPT | Mod: GO,CO

## 2024-10-16 PROCEDURE — 97530 THERAPEUTIC ACTIVITIES: CPT | Mod: GO,CO

## 2024-10-16 PROCEDURE — 2500000001 HC RX 250 WO HCPCS SELF ADMINISTERED DRUGS (ALT 637 FOR MEDICARE OP): Performed by: INTERNAL MEDICINE

## 2024-10-16 PROCEDURE — 2500000001 HC RX 250 WO HCPCS SELF ADMINISTERED DRUGS (ALT 637 FOR MEDICARE OP): Performed by: STUDENT IN AN ORGANIZED HEALTH CARE EDUCATION/TRAINING PROGRAM

## 2024-10-16 PROCEDURE — 94799 UNLISTED PULMONARY SVC/PX: CPT

## 2024-10-16 PROCEDURE — 2500000002 HC RX 250 W HCPCS SELF ADMINISTERED DRUGS (ALT 637 FOR MEDICARE OP, ALT 636 FOR OP/ED): Performed by: INTERNAL MEDICINE

## 2024-10-16 PROCEDURE — 2060000001 HC INTERMEDIATE ICU ROOM DAILY

## 2024-10-16 PROCEDURE — 5A0935A ASSISTANCE WITH RESPIRATORY VENTILATION, LESS THAN 24 CONSECUTIVE HOURS, HIGH NASAL FLOW/VELOCITY: ICD-10-PCS | Performed by: ANESTHESIOLOGY

## 2024-10-16 PROCEDURE — 2500000004 HC RX 250 GENERAL PHARMACY W/ HCPCS (ALT 636 FOR OP/ED)

## 2024-10-16 PROCEDURE — 9420000001 HC RT PATIENT EDUCATION 5 MIN

## 2024-10-16 PROCEDURE — 82947 ASSAY GLUCOSE BLOOD QUANT: CPT

## 2024-10-16 PROCEDURE — 2500000001 HC RX 250 WO HCPCS SELF ADMINISTERED DRUGS (ALT 637 FOR MEDICARE OP): Performed by: NURSE PRACTITIONER

## 2024-10-16 PROCEDURE — 80053 COMPREHEN METABOLIC PANEL: CPT

## 2024-10-16 PROCEDURE — 36415 COLL VENOUS BLD VENIPUNCTURE: CPT

## 2024-10-16 RX ORDER — FLUTICASONE FUROATE AND VILANTEROL 200; 25 UG/1; UG/1
1 POWDER RESPIRATORY (INHALATION)
Status: CANCELLED | OUTPATIENT
Start: 2024-10-17

## 2024-10-16 RX ORDER — BUDESONIDE 0.5 MG/2ML
0.5 INHALANT ORAL
Status: DISCONTINUED | OUTPATIENT
Start: 2024-10-16 | End: 2024-10-23

## 2024-10-16 RX ORDER — CLOPIDOGREL BISULFATE 75 MG/1
75 TABLET ORAL DAILY
Status: DISCONTINUED | OUTPATIENT
Start: 2024-10-16 | End: 2024-10-24 | Stop reason: HOSPADM

## 2024-10-16 RX ADMIN — NYSTATIN 1 APPLICATION: 100000 POWDER TOPICAL at 08:59

## 2024-10-16 RX ADMIN — IPRATROPIUM BROMIDE AND ALBUTEROL SULFATE 3 ML: 2.5; .5 SOLUTION RESPIRATORY (INHALATION) at 07:45

## 2024-10-16 RX ADMIN — IPRATROPIUM BROMIDE AND ALBUTEROL SULFATE 3 ML: 2.5; .5 SOLUTION RESPIRATORY (INHALATION) at 03:27

## 2024-10-16 RX ADMIN — INSULIN LISPRO 3 UNITS: 100 INJECTION, SOLUTION INTRAVENOUS; SUBCUTANEOUS at 21:26

## 2024-10-16 RX ADMIN — INSULIN LISPRO 3 UNITS: 100 INJECTION, SOLUTION INTRAVENOUS; SUBCUTANEOUS at 17:55

## 2024-10-16 RX ADMIN — IPRATROPIUM BROMIDE AND ALBUTEROL SULFATE 3 ML: 2.5; .5 SOLUTION RESPIRATORY (INHALATION) at 18:50

## 2024-10-16 RX ADMIN — ACETAMINOPHEN 325MG 650 MG: 325 TABLET ORAL at 14:37

## 2024-10-16 RX ADMIN — ESCITALOPRAM OXALATE 10 MG: 10 TABLET ORAL at 08:59

## 2024-10-16 RX ADMIN — INSULIN LISPRO 3 UNITS: 100 INJECTION, SOLUTION INTRAVENOUS; SUBCUTANEOUS at 12:05

## 2024-10-16 RX ADMIN — METOPROLOL SUCCINATE 25 MG: 25 TABLET, FILM COATED, EXTENDED RELEASE ORAL at 08:59

## 2024-10-16 RX ADMIN — IPRATROPIUM BROMIDE AND ALBUTEROL SULFATE 3 ML: 2.5; .5 SOLUTION RESPIRATORY (INHALATION) at 23:47

## 2024-10-16 RX ADMIN — RIVAROXABAN 20 MG: 20 TABLET, FILM COATED ORAL at 19:00

## 2024-10-16 RX ADMIN — IPRATROPIUM BROMIDE AND ALBUTEROL SULFATE 3 ML: 2.5; .5 SOLUTION RESPIRATORY (INHALATION) at 11:40

## 2024-10-16 RX ADMIN — PIPERACILLIN SODIUM AND TAZOBACTAM SODIUM 3.38 G: 3; .375 INJECTION, SOLUTION INTRAVENOUS at 03:01

## 2024-10-16 RX ADMIN — NYSTATIN 1 APPLICATION: 100000 POWDER TOPICAL at 21:35

## 2024-10-16 RX ADMIN — INSULIN LISPRO 3 UNITS: 100 INJECTION, SOLUTION INTRAVENOUS; SUBCUTANEOUS at 08:59

## 2024-10-16 RX ADMIN — CLOPIDOGREL BISULFATE 75 MG: 75 TABLET ORAL at 09:38

## 2024-10-16 RX ADMIN — METOPROLOL SUCCINATE 25 MG: 25 TABLET, FILM COATED, EXTENDED RELEASE ORAL at 21:26

## 2024-10-16 RX ADMIN — FUROSEMIDE 80 MG: 10 INJECTION, SOLUTION INTRAMUSCULAR; INTRAVENOUS at 08:59

## 2024-10-16 RX ADMIN — NYSTATIN 1 APPLICATION: 100000 POWDER TOPICAL at 04:18

## 2024-10-16 RX ADMIN — SIMVASTATIN 40 MG: 40 TABLET, FILM COATED ORAL at 21:26

## 2024-10-16 RX ADMIN — BUDESONIDE INHALATION 0.5 MG: 0.5 SUSPENSION RESPIRATORY (INHALATION) at 08:25

## 2024-10-16 RX ADMIN — DAPAGLIFLOZIN 10 MG: 10 TABLET, FILM COATED ORAL at 08:59

## 2024-10-16 RX ADMIN — METHYLPREDNISOLONE SODIUM SUCCINATE 20 MG: 40 INJECTION, POWDER, FOR SOLUTION INTRAMUSCULAR; INTRAVENOUS at 11:28

## 2024-10-16 RX ADMIN — IPRATROPIUM BROMIDE AND ALBUTEROL SULFATE 3 ML: 2.5; .5 SOLUTION RESPIRATORY (INHALATION) at 15:25

## 2024-10-16 RX ADMIN — PIPERACILLIN SODIUM AND TAZOBACTAM SODIUM 3.38 G: 3; .375 INJECTION, SOLUTION INTRAVENOUS at 08:59

## 2024-10-16 RX ADMIN — LEVOTHYROXINE SODIUM 100 MCG: 0.1 TABLET ORAL at 06:32

## 2024-10-16 RX ADMIN — LOSARTAN POTASSIUM 50 MG: 50 TABLET, FILM COATED ORAL at 08:59

## 2024-10-16 ASSESSMENT — PAIN - FUNCTIONAL ASSESSMENT
PAIN_FUNCTIONAL_ASSESSMENT: 0-10

## 2024-10-16 ASSESSMENT — PAIN DESCRIPTION - ORIENTATION: ORIENTATION: MID

## 2024-10-16 ASSESSMENT — COGNITIVE AND FUNCTIONAL STATUS - GENERAL
MOVING TO AND FROM BED TO CHAIR: A LITTLE
CLIMB 3 TO 5 STEPS WITH RAILING: TOTAL
DRESSING REGULAR UPPER BODY CLOTHING: A LITTLE
HELP NEEDED FOR BATHING: A LOT
MOBILITY SCORE: 14
DAILY ACTIVITIY SCORE: 17
WALKING IN HOSPITAL ROOM: A LOT
TOILETING: A LITTLE
STANDING UP FROM CHAIR USING ARMS: A LITTLE
DRESSING REGULAR LOWER BODY CLOTHING: A LOT
MOVING FROM LYING ON BACK TO SITTING ON SIDE OF FLAT BED WITH BEDRAILS: A LITTLE
PERSONAL GROOMING: A LITTLE
TURNING FROM BACK TO SIDE WHILE IN FLAT BAD: A LOT

## 2024-10-16 ASSESSMENT — PAIN SCALES - GENERAL
PAINLEVEL_OUTOF10: 0 - NO PAIN
PAINLEVEL_OUTOF10: 5 - MODERATE PAIN
PAINLEVEL_OUTOF10: 5 - MODERATE PAIN
PAINLEVEL_OUTOF10: 0 - NO PAIN

## 2024-10-16 ASSESSMENT — ACTIVITIES OF DAILY LIVING (ADL): HOME_MANAGEMENT_TIME_ENTRY: 25

## 2024-10-16 ASSESSMENT — PAIN DESCRIPTION - DESCRIPTORS: DESCRIPTORS: ACHING

## 2024-10-16 ASSESSMENT — PAIN DESCRIPTION - LOCATION: LOCATION: SACRUM

## 2024-10-16 NOTE — ASSESSMENT & PLAN NOTE
This seems to be her primary problem.  She is now being rate controlled terms of her A-fib.  She is on Lasix milligrams IV per day.  At some point we will switch her to p.o. Lasix and add spironolactone.  Will get another chest x-ray tomorrow.  Appreciate cardiology input.

## 2024-10-16 NOTE — PROGRESS NOTES
"Kay Pike is a 59 y.o. female on day 6 of admission presenting with Systolic heart failure secondary to coronary artery disease.    Subjective   Resting comfortably.  Remains in sinus rhythm.       Objective     Physical Exam   Gen: NAD   Neck: no JVD, carotid upstroke is brisk and without delay   Heart: rrr, s1s2+ no mrg   Lungs: CTA   Ext: warm no edema  Last Recorded Vitals  Blood pressure (!) 135/48, pulse 82, temperature 36.9 °C (98.4 °F), temperature source Axillary, resp. rate 26, height 1.575 m (5' 2.01\"), weight 118 kg (259 lb 14.8 oz), SpO2 95%.  Intake/Output last 3 Shifts:  I/O last 3 completed shifts:  In: 1200 (10.2 mL/kg) [P.O.:900; IV Piggyback:300]  Out: 7225 (61.3 mL/kg) [Urine:7225 (1.7 mL/kg/hr)]  Weight: 117.9 kg         Assessment/Plan   Assessment & Plan  Shortness of breath    Systolic heart failure secondary to coronary artery disease    COPD (chronic obstructive pulmonary disease) with acute bronchitis (Multi)    Hyperglycemia     lateral ST elevation myocardial infarction secondary to thrombus seen on cardiac catheterization distal diagonal 1.  No indication for intervention since the lesion is very distally and the artery diameter is small overall for intervention.  The remainder of her coronaries appears without obstructive coronary disease.  Recommend aspirin Brilinta for 12 months.  High intensity statin.  The echo showed ejection fraction of 45% with distal lateral apical anterior wall hypokinesis consistent with the territory of distal diagonal 1 which is almost dual system.     2.  Acute on chronic respiratory failure secondary to combination of COPD exacerbation and acute decompensated heart failure diastolic dysfunction.  We will decrease losartan to 50 mg oral daily.  Start on metoprolol succinate 25 mg oral daily.  Continue diuresis.  Will monitor.  Will start Farxiga 10 mg oral daily.      3.  Hypertension.  We will decrease losartan to 50 mg oral daily.  Start metoprolol " succinate 25 mg oral daily.       4.  Hyperlipidemia continue high intensity statin.     5.  Morbid obesity.     Patient stable from my standpoint.  Recommend her to be on furosemide 80 mg oral daily, Farxiga 10 mg oral daily, aspirin 81 mg oral daily, Brilinta 90 mg oral twice daily, atorvastatin 80 mg oral daily, metoprolol succinate 50 mg oral daily, losartan 50 mg oral daily.  Follow-up in my office in 1 to 2 weeks.  Will sign off please call with any question     10/14: Be consulted for rapid heart rates initially suspected to be SVT.  Review of telemetry data as well as review of EKG in my opinion reveals a rapid atrial fibrillation with heart rates in the 160s.  Patient does not have a history of atrial fibrillation.  Most recent echocardiogram shows reduced ejection fraction of 40 to 45% with no significant atrial dilatation or abnormal valvular function.  Patient is currently experiencing an exacerbation of COPD.  She did undergo cardiac catheterization which revealed:  thrombus seen on cardiac catheterization distal diagonal 1.  No indication for intervention since the lesion is very distally and the artery diameter is small overall for intervention.  Since cardiac catheterization the intensivist team focused on fluid volume management as she appeared to be fluid overloaded on admission as well exacerbation of COPD.  On assessment today her lung sounds are rhonchorous throughout.  She has 2-3 word conversational dyspnea.  She remains with lymphedema to bilateral lower extremities.  At this point would continue with oral metoprolol, however would increase this to 25 mg p.o. twice daily.  Noting reduced ejection fraction of 40% would like to try to avoid diltiazem at this time.  Will utilize digoxin 500 mcg x 1 followed by 250 mcg x 1 4 hours later.  Would plan to start the patient on 125 mcg of digoxin tomorrow morning.  Concerning anticoagulation patient's [id number] equals 4.  Anticoagulation is  generally recommended.  At this point she has been placed on aspirin as well as ticagrelor after her recent cardiac catheterization with no stent placement.  Generally triple therapy is not recommended long term.  For today we will continue with her oral aspirin and Brilinta.  Will reevaluate tomorrow to assess burden of atrial fibrillation and at that point if she remains in atrial fibrillation we will likely stop aspirin and continue with ticagrelor and apixaban.  Time my assessment she does have rhonchorous breath sounds throughout.  She continues to receive IV furosemide which I agree with.  Will follow with you.      10/15: Improved over the past 24 hours.  Patient was given IV digoxin yesterday for  first diagnosed rapid atrial fibrillation.  This was very effective and the patient spontaneously converted back to a sinus rhythm at approximately 6 PM yesterday and has remained in a sinus rhythm since that time.  Additionally her beta-blocker was increased.  At this point we will trial discontinuation of digoxin and attempt to continue with beta-blocker alone.  Concerning anticoagulation as noted above [id number] equals 4.  Anticoagulation in this patient is recommended.  She is currently on aspirin as well as ticagrelor.  Will stop oral aspirin and start the patient on rivaroxaban for stroke risk reduction.  Lung sounds have significantly improved with BiPAP overnight.  Overall improving.  Would like to follow 1 day further.  Will follow with you.    8/16: Remains in sinus rhythm.  Continue metoprolol.  Continue oral anticoagulation for stroke risk reduction.  Recent NSTEMI from first diagonal thromboembolism.  That being said coronary angiography does show some element of atherosclerotic disease.  I am switching her antiplatelet from Brilinta to Plavix.  Continue statin.  Reasonable to transfer to stepdown today.  Will follow.           Sha You,

## 2024-10-16 NOTE — ASSESSMENT & PLAN NOTE
She does not use home oxygen.  I think we can cut back on the steroids.  Will lower the Solu-Medrol to 20 mg IV twice a day.  She has been getting Zosyn.  Is supposed to stop it 5 days so I will let that run out.

## 2024-10-16 NOTE — PROGRESS NOTES
Physical Therapy    Physical Therapy Treatment    Patient Name: Kay Pike  MRN: [id number]  Department: [facility] S ICU  Room: 11/11-A  Today's Date: 10/16/2024  Time Calculation  Start Time: 1405  Stop Time: 1437  Time Calculation (min): 32 min    Assessment/Plan   PT Assessment  End of Session Communication: Bedside nurse  Assessment Comment: The pt made adequate progress toward her functional mobility goals. Pt required minimally increased assist during functional mobility compared to her reported baseline. Pt would benefit from continued skilled PT services for maximizing independence and safety prior to & after discharge (MODERATE intensity).  End of Session Patient Position: Up in chair, Alarm off, not on at start of session (call light & tray table within reach. RN & student nurse in room.)     PT Plan  Treatment/Interventions: Bed mobility, Transfer training, Gait training, Strengthening, Therapeutic exercise, Therapeutic activity, Balance training, Endurance training  PT Plan: Ongoing PT  PT Frequency: 6 times per week  PT Discharge Recommendations: Moderate intensity level of continued care  Equipment Recommended upon Discharge: Other (comment) (if homegoing, consider Jr FWW)  PT Recommended Transfer Status: Assist x1, Assistive device (Jr FWW)  PT - OK to Discharge: Yes      General Visit Information:   PT  Visit  PT Received On: 10/16/24  Response to Previous Treatment: Patient with no complaints from previous session.    Reason for Referral: Impaired functional mobility. Pt admitted for acute on chronic respiratory failure due to COPD & CHF exacerbations and NSTEMI.    Past Medical History Relevant to Rehab: anxiety, asthma, COPD, DM-II, lymphedema (BLE), morbid obesity, VLADIMIR, current tobacco smoker (> 1 PPD).    Family/Caregiver Present: No  Prior to Session Communication: Bedside nurse  Patient Position Received: Bed, 3 rail up, Alarm off, not on at start of session  General Comment: RN cleared pt for  "participation. She agreed to session and was fully engaged throughout.    Subjective   Precautions:  Precautions  Hearing/Visual Limitations: reading glasses  Medical Precautions: Fall precautions, Oxygen therapy device and L/min  Precautions Comment: HFNC 35 lpm, 40% FiO2. Naima. Telemetry.    Vital Signs (Past 2hrs)        Date/Time Vitals Session Patient Position Pulse Resp SpO2 BP MAP (mmHg)    10/16/24 1400 --  --  88  29  96 %  --  --     10/16/24 1405 Pre PT  --  87  29  97 %  --  --                   Vital Signs Comment: Supine/HOB elevated. SpO2 during ambulation trials 86-90% with timely recovery (<20 sec) to 95% once seated.     Objective   Pain:  Pain Assessment  0-10 (Numeric) Pain Score: 5 - Moderate pain  Pain Location: Sacrum (\"my butt\")  Pain Interventions: Repositioned (RN administered pain med immediately after tx)    Cognition:  Cognition  Overall Cognitive Status: Within Functional Limits       Postural Control:  Static Sitting Balance  (BUE supported: distant S)    Static Standing Balance  (RUE supported on FWW: close S)    Dynamic Standing Balance  (BUE supported on FWW: close S/CGA)     Activity Tolerance:  Activity Tolerance  Endurance: Tolerates 30 min exercise with multiple rests  Treatments:  Therapeutic Exercise  Supine, BLE:   -AP x10  -hip x2  -hip/knee flex (heel slide) x5    In supported sitting, BLE:  -DF/PF x5  -knee ext x3  -hip abd x6  -hip flex (march) x4.    Pt instructed to perform above listed supine & seated therex as able throughout her stay, x5-10 reps with attention to fatigue for rest breaks. Pt reported she does not feel SOB with therex or mobility.    Standing at FWW: lateral weight shifting x1 minute and total duration 2 min, 10 sec. Close S level.    Therapeutic Activity  Pt educated in oxygen/energy conservation strategies with focus this date on slower pacing/velocity and multiple trials to achieve goal. Prior to education, pt stated pursed lip breathing (PLB) as 1 " type of energy/oxygen conservation strategy. Instruction with demo provided for rounded lips with exhalation for PLB.    Bed Mobility  Bed Mobility: Yes  Bed Mobility 1  Bed Mobility 1: Supine to sitting  Level of Assistance 1: Contact guard (intermittently. Increased time/effort for trunk elevation.)  Bed Mobility Comments 1: HOB elevated ~40°and used L bed rail.  Bed Mobility 2  Bed Mobility  2: Scooting (fwd while seated EOB)  Level of Assistance 2: Distant supervision (used BUEs)      Transfers  Technique 1: Sit to stand  Transfer Device 1: Walker (FWW; elevated EOB due to pt's stature or bilateral armrests)  Transfer Level of Assistance 1:  (CGA at EOB (x1 trial) and close S at bedside chair (x2 trials). Minimal but consistent VCs for walker safety/BUE placement.)    Technique 2: Stand to sit  Transfer Device 2: Walker (FWW; bilateral armrests)  Transfer Level of Assistance 2: Contact guard (minimal but consistent VCs for BUE placement/walker safety.)  Trials/Comments 2: x3 trials    Technique 3: Stand pivot, To left (bed>chair)  Transfer Device 3: Walker  Transfer Level of Assistance 3: Contact guard (Minimal VC for walker safety during sit<>stand)    Ambulation/Gait Training  Surface 1: Level tile  Device 1: Rolling walker  Assistance 1: Contact guard, intermittently. Moderate verbal cues for increased cervical ext, fwd gaze, walker mgmt (do not lift), walker sequenicng with retro stepping, and increased bilateral step length with fwd stepping.  Comments/Distance (ft) 1: </=4 ft fwd then retro, 2 trials with prolonged seated rest break in between. Minimal step through pattern with discontinuous movement. Slow velocity. Decreased bilat DF in swing and intial contact but no skimming. No threat for LOB.      Outcome Measures:  [facility] Basic Mobility  Turning from your back to your side while in a flat bed without using bedrails: A little  Moving from lying on your back to sitting on the side of a flat bed without  using bedrails: A lot  Moving to and from bed to chair (including a wheelchair): A little  Standing up from a chair using your arms (e.g. wheelchair or bedside chair): A little  To walk in hospital room: A lot  Climbing 3-5 steps with railing: Total  Basic Mobility - Total Score: 14    FSS-ICU  Ambulation: Walks <50 feet with any assistance x1 or walks any distance with assistance x2 people  Rolling: Minimal assistance (performs 75% or more of task)  Sitting: Supervision or set-up only  Transfer Sit-to-Stand: Minimal assistance (performs 75% or more of task)  Transfer Supine-to-Sit: Moderate assistance (performs 50 - 74% of task)  Total Score: 17    Education Documentation  Mobility Training, taught by Yas Loaj PT at 10/16/2024  3:12 PM.  Learner: Patient  Readiness: Acceptance  Method: Explanation  Response: Needs Reinforcement, Demonstrated Understanding, Verbalizes Understanding    Education Comments  No comments found.       Encounter Problems       Encounter Problems (Active)       PT Problem       Pt will transition supine<>sit using hospital bed with mod I.  (Progressing)       Start:  10/14/24    Expected End:  11/10/24            Pt will transfer sit<>stand with FWW & mod I.  (Progressing)       Start:  10/14/24    Expected End:  11/10/24            Pt will ambulate >/=50 ft with FWW & mod I.  (Progressing)       Start:  10/14/24    Expected End:  11/10/24            Pt will demonstrate oxygen conservation strategies (eg, pursed lip breathing technique, no verbalization, etc) during exertive functional mobility/activities with at most 1 verbal cue (distant S). (Progressing)       Start:  10/14/24    Expected End:  11/10/24

## 2024-10-16 NOTE — PROGRESS NOTES
Spiritual Care Visit    Clinical Encounter Type  Visited With: Patient  Routine Visit: Follow-up  Continue Visiting: Yes         Values/Beliefs  Spiritual Requests During Hospitalization: Kay received Communion today.    Sacramental Encounters  Communion: Patient wants communion  Communion Given Indicator: Yes     Stephane Felder

## 2024-10-16 NOTE — PROGRESS NOTES
Patient not medically clear. Patient remains in the ICU. TCC provided patient with assistance information and left a SNF list. TCC left patient's sister, Ruby, a voicemail requesting a call back to review the information that was left. At this time there is not a safe discharge plan in place. Will follow.      10/16/24 1403   Discharge Planning   Home or Post Acute Services Other (Comment)  (TBD)   Expected Discharge Disposition Othe  (TBD)   Does the patient need discharge transport arranged? No

## 2024-10-16 NOTE — ASSESSMENT & PLAN NOTE
Need to check hemoglobin A1c.  Does not have a history of diabetes but she has been on high-dose steroids.

## 2024-10-16 NOTE — NURSING NOTE
Wound Wednesday head to toe skin assessment completed. Wound care updated pictures with measurements 10/15/2024. Dressing changed to left posterior calf and right buttock per order. Pt being turned q2h,  bariatric mattress for additional protection.

## 2024-10-16 NOTE — DOCUMENTATION CLARIFICATION NOTE
"    PATIENT:               MARIANNA OWUSU  ACCT #:                  [id number]  MRN:                       [id number]  :                       1965  ADMIT DATE:       10/10/2024 6:58 PM  DISCH DATE:  RESPONDING PROVIDER #:        [id number]          PROVIDER RESPONSE TEXT:    NSTEMI    CDI QUERY TEXT:    Clarification        Instruction:    Based on your assessment of the patient and the clinical information, please provide the requested documentation by clicking on the appropriate radio button and enter any additional information if prompted.    Question: Based on your medical judgment, can you please clarify which of these conditions is the most clinically supported    When answering this query, please exercise your independent professional judgment. The fact that a question is being asked, does not imply that any particular answer is desired or expected.    The patient's clinical indicators include:  Clinical Information: There is conflicting documentation in the medical record which requires clarification.    -The diagnosis of STEMI was documented on 10/15 by Nan Harris PA-C    -The diagnosis of NSTEMI was documented on 10/16 by Dr Candelario    Clinical Indicators:    10/10 Cardiology consult and PN from 10/11 -10/16 PN Cardiology: \"lateral ST elevation myocardial infarction secondary to thrombus seen on cardiac catheterization distal diagonal\"    10/10: Cath report: \"STEMI: ST elevation myocardial infarction\"    10/11: HP: \"STEMI - no occlusions on heart cath\"    Treatment: Cardiology consult, Cardiac cath, Echocardiogram, Metoprolol 25 mg daily, Cozaar 50 mg daily, Plavix 75 mg daily, Xaralto 20 mg daily    Risk Factors: DM, HTN, VLADIMIR, Obesity, CHF, COPD  Options provided:  -- STEMI  -- NSTEMI  -- Other - I will add my own diagnosis  -- Refer to Clinical Documentation Reviewer    Query created by: Eleuterio Espinal on 10/16/2024 11:35 AM      Electronically signed by:  ASHANTI CANDELARIO MD 10/16/2024 6:07 PM          "

## 2024-10-16 NOTE — CARE PLAN
"The patient's goals for the shift include \"get out of bed into a chair\"    The clinical goals for the shift include Wean oxygen as tolerated, increase activity as tolerated      Problem: Safety - Adult  Goal: Free from fall injury  Outcome: Progressing     Problem: Discharge Planning  Goal: Discharge to home or other facility with appropriate resources  Outcome: Progressing     Problem: Chronic Conditions and Co-morbidities  Goal: Patient's chronic conditions and co-morbidity symptoms are monitored and maintained or improved  Outcome: Progressing     Problem: Skin  Goal: Decreased wound size/increased tissue granulation at next dressing change  Outcome: Progressing  Flowsheets (Taken 10/16/2024 0445)  Decreased wound size/increased tissue granulation at next dressing change:   Utilize specialty bed per algorithm   Protective dressings over bony prominences  Goal: Participates in plan/prevention/treatment measures  Outcome: Progressing  Flowsheets (Taken 10/16/2024 0445)  Participates in plan/prevention/treatment measures:   Discuss with provider PT/OT consult   Increase activity/out of bed for meals   Elevate heels  Goal: Prevent/manage excess moisture  Outcome: Progressing  Flowsheets (Taken 10/16/2024 0445)  Prevent/manage excess moisture:   Moisturize dry skin   Cleanse incontinence/protect with barrier cream  Goal: Prevent/minimize sheer/friction injuries  Outcome: Progressing  Flowsheets (Taken 10/16/2024 0445)  Prevent/minimize sheer/friction injuries:   Complete micro-shifts as needed if patient unable. Adjust patient position to relieve pressure points, not a full turn   HOB 30 degrees or less   Increase activity/out of bed for meals   Turn/reposition every 2 hours/use positioning/transfer devices   Use pull sheet   Utilize specialty bed per algorithm  Goal: Promote/optimize nutrition  Outcome: Progressing  Flowsheets (Taken 10/16/2024 0445)  Promote/optimize nutrition:   Consume > 50% meals/supplements   " Monitor/record intake including meals   Offer water/supplements/favorite foods  Goal: Promote skin healing  Outcome: Progressing  Flowsheets (Taken 10/16/2024 9104)  Promote skin healing:   Turn/reposition every 2 hours/use positioning/transfer devices   Rotate device position/do not position patient on device   Protective dressings over bony prominences     Problem: Respiratory  Goal: Clear secretions with interventions this shift  Outcome: Progressing  Goal: Minimize anxiety/maximize coping throughout shift  Outcome: Progressing  Goal: Minimal/no exertional discomfort or dyspnea this shift  Outcome: Progressing  Goal: No signs of respiratory distress (eg. Use of accessory muscles. Peds grunting)  Outcome: Progressing  Goal: Patent airway maintained this shift  Outcome: Progressing  Goal: Tolerate pulmonary toileting this shift  Outcome: Progressing  Goal: Verbalize decreased shortness of breath this shift  Outcome: Progressing  Goal: Wean oxygen to maintain O2 saturation per order/standard this shift  Outcome: Progressing  Goal: Increase self care and/or family involvement in next 24 hours  Outcome: Progressing     Problem: Bathing  Goal: LTG - Patient will utilize adaptive techniques to bathe body Min A  Outcome: Progressing     Problem: Dressings Lower Extremities  Goal: LTG - Patient will dress lower body Min A  Outcome: Progressing     Problem: Dressing Upper Extremities  Goal: LTG - Patient will complete upper body dressing Mod I  Outcome: Progressing     Problem: Toileting  Goal: LTG - Patient will complete daily toileting tasks Min A  Outcome: Progressing     Problem: Pain  Goal: Takes deep breaths with improved pain control throughout the shift  Outcome: Progressing  Goal: Turns in bed with improved pain control throughout the shift  Outcome: Progressing  Goal: Performs ADL's with improved pain control throughout shift  Outcome: Progressing  Goal: Participates in PT with improved pain control throughout  the shift  Outcome: Progressing

## 2024-10-16 NOTE — ASSESSMENT & PLAN NOTE
She has acute respiratory failure due to combination of COPD and CHF.  See below.  Now that she is coming out of the ICU I will consult pulmonary.  She is still requiring a lot of respiratory support

## 2024-10-16 NOTE — PROGRESS NOTES
Kay Pike is a 59 y.o. female on day 6 of admission presenting with Shortness of breath.      Subjective   Has been in the ICU since October 10 with acute respiratory failure attributed to CHF and COPD.  She has had an NSTEMI.  Has had a cardiac cath.  Cardiology has been following.  She also had issues with A-fib.  She is on an appropriate cardiac regimen currently.  She has been diuresed.  Her EF is 40% .  She is also been on steroids and antibiotics.  Yesterday the ICU team deemed that she was stable to leave the ICU.  She still requiring high flow in the daytime and BiPAP at night.  During my visit I had respiratory take her off BiPAP and put her on high flow.  She looked comfortable on the high flow.  Her mental status is good.       Objective   Alert oriented  Heart regular rate and rhythm  Lungs clear to auscultation  Abdomen soft nontender nondistended  Extremities she has very obvious lymphedema there is some pitting her legs are erythematous bilaterally from the dorsum of her feet up to her mid shin.  There is a superficial ulceration on the left shin  Neuro cranial nerves intact good tone strength in arms and legs  Last Recorded Vitals  /50   Pulse 75   Temp 36.9 °C (98.4 °F) (Axillary)   Resp 19   Wt 118 kg (259 lb 14.8 oz)   SpO2 98%   Intake/Output last 3 Shifts:    Intake/Output Summary (Last 24 hours) at 10/16/2024 0805  Last data filed at 10/16/2024 0600  Gross per 24 hour   Intake 920 ml   Output 6450 ml   Net -5530 ml       Admission Weight  Weight: 127 kg (279 lb) (10/10/24 1903)    Daily Weight  10/16/24 : 118 kg (259 lb 14.8 oz)    Image Results  XR chest 1 view  Narrative: Interpreted By:  Geovanny Price,   STUDY:  XR CHEST 1 VIEW;  10/15/2024 10:43 am      INDICATION:  Signs/Symptoms:increased O2 demands.          COMPARISON:  10/12/2025      ACCESSION NUMBER(S):  [id number]      ORDERING CLINICIAN:  MAXIMO ZAPATA      FINDINGS:                  CARDIOMEDIASTINAL  SILHOUETTE:  Cardiomediastinal silhouette is moderately enlarged. There is  pulmonary vascular congestion with mild edema      LUNGS:  No dense consolidation seen. No large effusion      ABDOMEN:  No remarkable upper abdominal findings.      BONES:  No acute osseous changes.      Impression: 1. Moderate enlargement of the cardiac silhouette with pulmonary edema              MACRO:  None      Signed by: Geovanny Price 10/15/2024 11:14 AM  Dictation workstation:   [id number]  Electrocardiogram, 12-lead PRN ACS symptoms  Sinus tachycardia  Left anterior fascicular block  Lateral infarct , age undetermined  Abnormal ECG    Confirmed by Yousif Howard ([id number]) on 10/15/2024 9:13:18 AM      Physical Exam    Relevant Results               Assessment/Plan             Assessment & Plan  Shortness of breath  She has acute respiratory failure due to combination of COPD and CHF.  See below.  Now that she is coming out of the ICU I will consult pulmonary.  She is still requiring a lot of respiratory support  Systolic heart failure secondary to coronary artery disease  This seems to be her primary problem.  She is now being rate controlled terms of her A-fib.  She is on Lasix milligrams IV per day.  At some point we will switch her to p.o. Lasix and add spironolactone.  Will get another chest x-ray tomorrow.  Appreciate cardiology input.  COPD (chronic obstructive pulmonary disease) with acute bronchitis (Multi)  She does not use home oxygen.  I think we can cut back on the steroids.  Will lower the Solu-Medrol to 20 mg IV twice a day.  She has been getting Zosyn.  Is supposed to stop it 5 days so I will let that run out.  Hyperglycemia  Need to check hemoglobin A1c.  Does not have a history of diabetes but she has been on high-dose steroids.    She is somewhat obese and has lymphedema.  She is got superficial ulcer of her buttocks and her shin.  Wound care as ordered.  Will ask for a specialty mattress.  Will keep the [facility]  in for now.  It seems obvious to me that she will need some time in a rehab facility.  We can start talking about that although she is not ready for discharge.          Jack Weinstein MD

## 2024-10-16 NOTE — PROGRESS NOTES
Occupational Therapy    OT Treatment    Patient Name: Kay Pike  MRN: [id number]  Department: [facility] 3 S ICU  Room: 11/11-A  Today's Date: 10/16/2024  Time Calculation  Start Time: 0812  Stop Time: 0852  Time Calculation (min): 40 min        Assessment:  OT Assessment: Tolerated session well, demonstrating progression towards POC with increased time + effort required for all functional tasks. Pt would benefit from continued skilled OT services to improve strength, balance, and functional tolerance to increase independence with ADL tasks  End of Session Communication: Bedside nurse  End of Session Patient Position: Up in chair, Alarm off, not on at start of session  OT Assessment Results: Decreased ADL status, Decreased endurance, Decreased functional mobility  Plan:  Treatment Interventions: ADL retraining, Functional transfer training, Endurance training, Patient/family training, Equipment evaluation/education, Compensatory technique education, Continued evaluation  OT Frequency: 4 times per week  OT Discharge Recommendations: Moderate intensity level of continued care  OT Recommended Transfer Status: Minimal assist, Assist of 1  OT - OK to Discharge: Yes  Treatment Interventions: ADL retraining, Functional transfer training, Endurance training, Patient/family training, Equipment evaluation/education, Compensatory technique education, Continued evaluation    Subjective   Previous Visit Info:  OT Last Visit  OT Received On: 10/16/24  General:  General  Prior to Session Communication: Bedside nurse  Patient Position Received: Bed, 3 rail up, Alarm off, not on at start of session  General Comment: Cleared for therapy per RN. Pt supine in bed upon arrival and agreeable to tx  Precautions:  Hearing/Visual Limitations: reading glasses  Medical Precautions: Fall precautions, Oxygen therapy device and L/min (HFNC 40% FiO2)    Vital Signs (Past 2hrs)        Date/Time Vitals Session Patient Position Pulse Resp SpO2 BP MAP  (mmHg)    10/16/24 0812 --  --  --  --  96 %  --  --     10/16/24 0845 --  --  82  26  95 %  135/48  71                   Pain:  Pain Assessment  Pain Assessment: 0-10  0-10 (Numeric) Pain Score: 0 - No pain    Objective    Cognition:  Cognition  Overall Cognitive Status: Within Functional Limits    Activities of Daily Living:    Grooming  Grooming Level of Assistance: Setup, Close supervision, Minimum assistance  Grooming Where Assessed: Chair  Grooming Comments: face washing and oral hygiene tasks. Hair brushing task with min A for task efficiency d/t increased knotting    UE Bathing  UE Bathing Comments: supplied pt with deodorant wipes to clense axillary    UE Dressing  UE Dressing Level of Assistance: Minimum assistance  UE Dressing Where Assessed: Chair  UE Dressing Comments: don/doff gown    LE Dressing  LE Dressing: Yes  Sock Level of Assistance: Dependent  LE Dressing Where Assessed: Bed level  LE Dressing Comments: don socks    Bed Mobility/Transfers: Bed Mobility  Bed Mobility: Yes  Bed Mobility 1  Bed Mobility 1: Supine to sitting  Level of Assistance 1: Minimum assistance  Bed Mobility Comments 1: assist with trunk elevation with cues for use of bed rail for UE support and to scoot hips to EOB, requiring additional assist to scoot with drawsheet for safety d/t elevated bed. Pt expressing mild dizziness once seated, recovering after minimal rest.    Transfers  Transfer: Yes  Transfer 1  Technique 1: Sit to stand  Transfer Device 1: Walker  Transfer Level of Assistance 1: Minimum assistance  Trials/Comments 1: assist for trunk elevation with cues for proper hand placement  Transfers 2  Technique 2: Stand to sit  Transfer Device 2: Walker  Transfer Level of Assistance 2: Contact guard  Trials/Comments 2: assist for balanace with cues for proper hand placement and eccentric lowering    Functional Mobility:  Functional Mobility  Functional Mobility Performed: Yes  Functional Mobility 1  Device 1: Rolling  walker  Assistance 1: Minimum assistance, Contact guard  Comments 1: tolerated taking steps to chair at FWW with cues for pacing and postual alignment, demonstrating fair balance throughout task    Standing Balance:  Static Standing Balance  Static Standing-Level of Assistance: Contact guard  Static Standing-Comment/Number of Minutes: fair balance during transfer task    Outcome Measures:[facility] Daily Activity  Putting on and taking off regular lower body clothing: A lot  Bathing (including washing, rinsing, drying): A lot  Putting on and taking off regular upper body clothing: A little  Toileting, which includes using toilet, bedpan or urinal: A little  Taking care of personal grooming such as brushing teeth: A little  Eating Meals: None  Daily Activity - Total Score: 17    Education Documentation  Body Mechanics, taught by TOMMY Flores at 10/16/2024 10:08 AM.  Learner: Patient  Readiness: Acceptance  Method: Explanation  Response: Verbalizes Understanding, Demonstrated Understanding    Home Exercise Program, taught by TOMMY Flores at 10/16/2024 10:08 AM.  Learner: Patient  Readiness: Acceptance  Method: Explanation  Response: Verbalizes Understanding, Demonstrated Understanding    ADL Training, taught by TOMMY Flores at 10/16/2024 10:08 AM.  Learner: Patient  Readiness: Acceptance  Method: Explanation  Response: Verbalizes Understanding, Demonstrated Understanding    Education Comments  No comments found.      Problem: Bathing  Goal: LTG - Patient will utilize adaptive techniques to bathe body Min A  Outcome: Progressing     Problem: Dressings Lower Extremities  Goal: LTG - Patient will dress lower body Min A  Outcome: Progressing     Problem: Dressing Upper Extremities  Goal: LTG - Patient will complete upper body dressing Mod I  Outcome: Progressing     Problem: Functional Mobility  Goal: Pt will demonstrate sitting/standing tolerance x 4 min, without significant inc in  RR to complete ADL routine.   Outcome: Progressing     Problem: Toileting  Goal: LTG - Patient will complete daily toileting tasks Min A  Outcome: Progressing

## 2024-10-17 ENCOUNTER — APPOINTMENT (OUTPATIENT)
Dept: RADIOLOGY | Facility: HOSPITAL | Age: 59
DRG: 280 | End: 2024-10-17
Payer: COMMERCIAL

## 2024-10-17 LAB
ALBUMIN SERPL BCP-MCNC: 3.1 G/DL (ref 3.4–5)
ALP SERPL-CCNC: 61 U/L (ref 33–110)
ALT SERPL W P-5'-P-CCNC: 22 U/L (ref 7–45)
ANION GAP SERPL CALCULATED.3IONS-SCNC: 8 MMOL/L (ref 10–20)
APPARATUS: ABNORMAL
ARTERIAL PATENCY WRIST A: POSITIVE
AST SERPL W P-5'-P-CCNC: 13 U/L (ref 9–39)
BASE EXCESS BLDA CALC-SCNC: 18.2 MMOL/L (ref -2–3)
BASOPHILS # BLD AUTO: 0.03 X10*3/UL (ref 0–0.1)
BASOPHILS NFR BLD AUTO: 0.2 %
BILIRUB SERPL-MCNC: 0.8 MG/DL (ref 0–1.2)
BODY TEMPERATURE: ABNORMAL
BUN SERPL-MCNC: 32 MG/DL (ref 6–23)
CALCIUM SERPL-MCNC: 8.7 MG/DL (ref 8.6–10.3)
CHLORIDE SERPL-SCNC: 92 MMOL/L (ref 98–107)
CO2 SERPL-SCNC: 41 MMOL/L (ref 21–32)
CREAT SERPL-MCNC: 0.63 MG/DL (ref 0.5–1.05)
EGFRCR SERPLBLD CKD-EPI 2021: >90 ML/MIN/1.73M*2
EOSINOPHIL # BLD AUTO: 0.09 X10*3/UL (ref 0–0.7)
EOSINOPHIL NFR BLD AUTO: 0.6 %
ERYTHROCYTE [DISTWIDTH] IN BLOOD BY AUTOMATED COUNT: 17.5 % (ref 11.5–14.5)
EST. AVERAGE GLUCOSE BLD GHB EST-MCNC: 146 MG/DL
GLUCOSE BLD MANUAL STRIP-MCNC: 123 MG/DL (ref 74–99)
GLUCOSE BLD MANUAL STRIP-MCNC: 128 MG/DL (ref 74–99)
GLUCOSE BLD MANUAL STRIP-MCNC: 139 MG/DL (ref 74–99)
GLUCOSE BLD MANUAL STRIP-MCNC: 217 MG/DL (ref 74–99)
GLUCOSE SERPL-MCNC: 97 MG/DL (ref 74–99)
HBA1C MFR BLD: 6.7 %
HCO3 BLDA-SCNC: 46.5 MMOL/L (ref 22–26)
HCT VFR BLD AUTO: 46.2 % (ref 36–46)
HGB BLD-MCNC: 14.4 G/DL (ref 12–16)
IMM GRANULOCYTES # BLD AUTO: 0.14 X10*3/UL (ref 0–0.7)
IMM GRANULOCYTES NFR BLD AUTO: 0.9 % (ref 0–0.9)
INHALED O2 CONCENTRATION: 40 %
LYMPHOCYTES # BLD AUTO: 2.05 X10*3/UL (ref 1.2–4.8)
LYMPHOCYTES NFR BLD AUTO: 13.5 %
MAGNESIUM SERPL-MCNC: 2.29 MG/DL (ref 1.6–2.4)
MCH RBC QN AUTO: 26.5 PG (ref 26–34)
MCHC RBC AUTO-ENTMCNC: 31.2 G/DL (ref 32–36)
MCV RBC AUTO: 85 FL (ref 80–100)
MONOCYTES # BLD AUTO: 1.05 X10*3/UL (ref 0.1–1)
MONOCYTES NFR BLD AUTO: 6.9 %
NEUTROPHILS # BLD AUTO: 11.78 X10*3/UL (ref 1.2–7.7)
NEUTROPHILS NFR BLD AUTO: 77.9 %
NRBC BLD-RTO: 0 /100 WBCS (ref 0–0)
OXYHGB MFR BLDA: 91 % (ref 94–98)
PCO2 BLDA: 70 MM HG (ref 38–42)
PH BLDA: 7.43 PH (ref 7.38–7.42)
PHOSPHATE SERPL-MCNC: 3.3 MG/DL (ref 2.5–4.9)
PLATELET # BLD AUTO: 187 X10*3/UL (ref 150–450)
PO2 BLDA: 65 MM HG (ref 85–95)
POTASSIUM SERPL-SCNC: 4.1 MMOL/L (ref 3.5–5.3)
PROT SERPL-MCNC: 6.2 G/DL (ref 6.4–8.2)
RBC # BLD AUTO: 5.44 X10*6/UL (ref 4–5.2)
SAO2 % BLDA: 94 % (ref 94–100)
SODIUM SERPL-SCNC: 137 MMOL/L (ref 136–145)
SPECIMEN DRAWN FROM PATIENT: ABNORMAL
WBC # BLD AUTO: 15.1 X10*3/UL (ref 4.4–11.3)

## 2024-10-17 PROCEDURE — 2060000001 HC INTERMEDIATE ICU ROOM DAILY

## 2024-10-17 PROCEDURE — 71045 X-RAY EXAM CHEST 1 VIEW: CPT

## 2024-10-17 PROCEDURE — 2500000001 HC RX 250 WO HCPCS SELF ADMINISTERED DRUGS (ALT 637 FOR MEDICARE OP)

## 2024-10-17 PROCEDURE — 2500000004 HC RX 250 GENERAL PHARMACY W/ HCPCS (ALT 636 FOR OP/ED): Performed by: INTERNAL MEDICINE

## 2024-10-17 PROCEDURE — 2500000002 HC RX 250 W HCPCS SELF ADMINISTERED DRUGS (ALT 637 FOR MEDICARE OP, ALT 636 FOR OP/ED): Performed by: INTERNAL MEDICINE

## 2024-10-17 PROCEDURE — 9420000001 HC RT PATIENT EDUCATION 5 MIN

## 2024-10-17 PROCEDURE — 97535 SELF CARE MNGMENT TRAINING: CPT | Mod: GO,CO

## 2024-10-17 PROCEDURE — 97110 THERAPEUTIC EXERCISES: CPT | Mod: GO,CO

## 2024-10-17 PROCEDURE — 84100 ASSAY OF PHOSPHORUS: CPT

## 2024-10-17 PROCEDURE — 83735 ASSAY OF MAGNESIUM: CPT

## 2024-10-17 PROCEDURE — 94640 AIRWAY INHALATION TREATMENT: CPT

## 2024-10-17 PROCEDURE — 94660 CPAP INITIATION&MGMT: CPT

## 2024-10-17 PROCEDURE — 84075 ASSAY ALKALINE PHOSPHATASE: CPT

## 2024-10-17 PROCEDURE — 99232 SBSQ HOSP IP/OBS MODERATE 35: CPT | Performed by: INTERNAL MEDICINE

## 2024-10-17 PROCEDURE — 36415 COLL VENOUS BLD VENIPUNCTURE: CPT

## 2024-10-17 PROCEDURE — 71045 X-RAY EXAM CHEST 1 VIEW: CPT | Performed by: RADIOLOGY

## 2024-10-17 PROCEDURE — 82805 BLOOD GASES W/O2 SATURATION: CPT | Performed by: INTERNAL MEDICINE

## 2024-10-17 PROCEDURE — 85025 COMPLETE CBC W/AUTO DIFF WBC: CPT

## 2024-10-17 PROCEDURE — 82947 ASSAY GLUCOSE BLOOD QUANT: CPT

## 2024-10-17 PROCEDURE — 97110 THERAPEUTIC EXERCISES: CPT | Mod: GP,CQ

## 2024-10-17 PROCEDURE — 2500000002 HC RX 250 W HCPCS SELF ADMINISTERED DRUGS (ALT 637 FOR MEDICARE OP, ALT 636 FOR OP/ED)

## 2024-10-17 PROCEDURE — 2500000004 HC RX 250 GENERAL PHARMACY W/ HCPCS (ALT 636 FOR OP/ED)

## 2024-10-17 PROCEDURE — 99233 SBSQ HOSP IP/OBS HIGH 50: CPT | Performed by: INTERNAL MEDICINE

## 2024-10-17 PROCEDURE — 2500000001 HC RX 250 WO HCPCS SELF ADMINISTERED DRUGS (ALT 637 FOR MEDICARE OP): Performed by: INTERNAL MEDICINE

## 2024-10-17 RX ORDER — FUROSEMIDE 10 MG/ML
60 INJECTION INTRAMUSCULAR; INTRAVENOUS EVERY 12 HOURS
Status: DISCONTINUED | OUTPATIENT
Start: 2024-10-17 | End: 2024-10-18

## 2024-10-17 RX ORDER — METOPROLOL SUCCINATE 50 MG/1
50 TABLET, EXTENDED RELEASE ORAL 2 TIMES DAILY
Status: DISCONTINUED | OUTPATIENT
Start: 2024-10-17 | End: 2024-10-24 | Stop reason: HOSPADM

## 2024-10-17 RX ORDER — PANTOPRAZOLE SODIUM 40 MG/1
40 TABLET, DELAYED RELEASE ORAL
Status: DISCONTINUED | OUTPATIENT
Start: 2024-10-18 | End: 2024-10-24 | Stop reason: HOSPADM

## 2024-10-17 RX ADMIN — LEVOTHYROXINE SODIUM 100 MCG: 0.1 TABLET ORAL at 06:47

## 2024-10-17 RX ADMIN — IPRATROPIUM BROMIDE AND ALBUTEROL SULFATE 3 ML: 2.5; .5 SOLUTION RESPIRATORY (INHALATION) at 19:34

## 2024-10-17 RX ADMIN — LOSARTAN POTASSIUM 50 MG: 50 TABLET, FILM COATED ORAL at 08:36

## 2024-10-17 RX ADMIN — METHYLPREDNISOLONE SODIUM SUCCINATE 20 MG: 40 INJECTION, POWDER, FOR SOLUTION INTRAMUSCULAR; INTRAVENOUS at 12:38

## 2024-10-17 RX ADMIN — IPRATROPIUM BROMIDE AND ALBUTEROL SULFATE 3 ML: 2.5; .5 SOLUTION RESPIRATORY (INHALATION) at 15:22

## 2024-10-17 RX ADMIN — FUROSEMIDE 60 MG: 10 INJECTION, SOLUTION INTRAMUSCULAR; INTRAVENOUS at 22:18

## 2024-10-17 RX ADMIN — ESCITALOPRAM OXALATE 10 MG: 10 TABLET ORAL at 08:36

## 2024-10-17 RX ADMIN — FUROSEMIDE 80 MG: 10 INJECTION, SOLUTION INTRAMUSCULAR; INTRAVENOUS at 08:37

## 2024-10-17 RX ADMIN — BUDESONIDE INHALATION 0.5 MG: 0.5 SUSPENSION RESPIRATORY (INHALATION) at 19:34

## 2024-10-17 RX ADMIN — METOPROLOL SUCCINATE 25 MG: 25 TABLET, FILM COATED, EXTENDED RELEASE ORAL at 08:36

## 2024-10-17 RX ADMIN — METHYLPREDNISOLONE SODIUM SUCCINATE 20 MG: 40 INJECTION, POWDER, FOR SOLUTION INTRAMUSCULAR; INTRAVENOUS at 22:18

## 2024-10-17 RX ADMIN — SIMVASTATIN 40 MG: 40 TABLET, FILM COATED ORAL at 20:39

## 2024-10-17 RX ADMIN — NYSTATIN 1 APPLICATION: 100000 POWDER TOPICAL at 09:39

## 2024-10-17 RX ADMIN — IPRATROPIUM BROMIDE AND ALBUTEROL SULFATE 3 ML: 2.5; .5 SOLUTION RESPIRATORY (INHALATION) at 12:11

## 2024-10-17 RX ADMIN — RIVAROXABAN 20 MG: 20 TABLET, FILM COATED ORAL at 16:24

## 2024-10-17 RX ADMIN — DAPAGLIFLOZIN 10 MG: 10 TABLET, FILM COATED ORAL at 08:36

## 2024-10-17 RX ADMIN — IPRATROPIUM BROMIDE AND ALBUTEROL SULFATE 3 ML: 2.5; .5 SOLUTION RESPIRATORY (INHALATION) at 23:51

## 2024-10-17 RX ADMIN — METOPROLOL SUCCINATE 50 MG: 50 TABLET, EXTENDED RELEASE ORAL at 20:39

## 2024-10-17 RX ADMIN — IPRATROPIUM BROMIDE AND ALBUTEROL SULFATE 3 ML: 2.5; .5 SOLUTION RESPIRATORY (INHALATION) at 07:16

## 2024-10-17 RX ADMIN — FUROSEMIDE 60 MG: 10 INJECTION, SOLUTION INTRAMUSCULAR; INTRAVENOUS at 11:24

## 2024-10-17 RX ADMIN — INSULIN LISPRO 6 UNITS: 100 INJECTION, SOLUTION INTRAVENOUS; SUBCUTANEOUS at 16:24

## 2024-10-17 RX ADMIN — IPRATROPIUM BROMIDE AND ALBUTEROL SULFATE 3 ML: 2.5; .5 SOLUTION RESPIRATORY (INHALATION) at 04:34

## 2024-10-17 ASSESSMENT — COGNITIVE AND FUNCTIONAL STATUS - GENERAL
DAILY ACTIVITIY SCORE: 15
TURNING FROM BACK TO SIDE WHILE IN FLAT BAD: A LOT
MOVING FROM LYING ON BACK TO SITTING ON SIDE OF FLAT BED WITH BEDRAILS: A LITTLE
TURNING FROM BACK TO SIDE WHILE IN FLAT BAD: A LITTLE
HELP NEEDED FOR BATHING: A LOT
EATING MEALS: A LITTLE
WALKING IN HOSPITAL ROOM: A LOT
DRESSING REGULAR UPPER BODY CLOTHING: A LITTLE
MOVING FROM LYING ON BACK TO SITTING ON SIDE OF FLAT BED WITH BEDRAILS: A LOT
TOILETING: A LOT
MOBILITY SCORE: 15
DAILY ACTIVITIY SCORE: 24
STANDING UP FROM CHAIR USING ARMS: A LOT
STANDING UP FROM CHAIR USING ARMS: A LITTLE
CLIMB 3 TO 5 STEPS WITH RAILING: A LOT
DRESSING REGULAR LOWER BODY CLOTHING: A LOT
CLIMB 3 TO 5 STEPS WITH RAILING: TOTAL
WALKING IN HOSPITAL ROOM: A LOT
MOBILITY SCORE: 12
PERSONAL GROOMING: A LITTLE
MOVING TO AND FROM BED TO CHAIR: A LOT
MOVING TO AND FROM BED TO CHAIR: A LITTLE

## 2024-10-17 ASSESSMENT — PAIN - FUNCTIONAL ASSESSMENT
PAIN_FUNCTIONAL_ASSESSMENT: 0-10
PAIN_FUNCTIONAL_ASSESSMENT: 0-10

## 2024-10-17 ASSESSMENT — ACTIVITIES OF DAILY LIVING (ADL): HOME_MANAGEMENT_TIME_ENTRY: 10

## 2024-10-17 ASSESSMENT — PAIN SCALES - GENERAL
PAINLEVEL_OUTOF10: 0 - NO PAIN

## 2024-10-17 NOTE — PROGRESS NOTES
Occupational Therapy    OT Treatment    Patient Name: Kay Pike  MRN: [id number]  Department: [facility] 3 E  Room: 16/16  Today's Date: 10/17/2024  Time Calculation  Start Time: 1349  Stop Time: 1412  Time Calculation (min): 23 min        Assessment:  OT Assessment: Gradual progress made towards OT goals. Continue with current OT POC to increase strength, balance and functional tolerance to maximize safety and independence during ADLs.  Barriers to Discharge: Other (Comment) (O2 requirements)  Evaluation/Treatment Tolerance: Patient limited by fatigue  End of Session Communication: Bedside nurse  End of Session Patient Position: Up in chair, Alarm off, not on at start of session (nursing made aware of pt positioning, all needs in reach)  OT Assessment Results: Decreased ADL status, Decreased endurance, Decreased functional mobility  Barriers to Discharge: Other (Comment) (O2 requirements)  Evaluation/Treatment Tolerance: Patient limited by fatigue  Plan:  Treatment Interventions: ADL retraining, Functional transfer training, Endurance training, Patient/family training, Equipment evaluation/education, Compensatory technique education, Continued evaluation  OT Frequency: 4 times per week  OT Discharge Recommendations: Moderate intensity level of continued care  OT Recommended Transfer Status: Minimal assist, Assist of 1  OT - OK to Discharge: Yes  Treatment Interventions: ADL retraining, Functional transfer training, Endurance training, Patient/family training, Equipment evaluation/education, Compensatory technique education, Continued evaluation    Subjective   Previous Visit Info:  OT Last Visit  OT Received On: 10/17/24  General:  General  Reason for Referral: Impaired ADLs/ mobility. Pt admitted for acute on chronic respiratory failure due to COPD & CHF exacerbations and NSTEMI.  Past Medical History Relevant to Rehab: anxiety, asthma, COPD, DM-II, lymphedema (BLE), morbid obesity, VLADIMIR, current tobacco smoker (> 1  PPD).  Prior to Session Communication: Bedside nurse  Patient Position Received: Bed, 3 rail up, Alarm off, not on at start of session  General Comment: Pt cleared for therapy session per nursing, cooperative this date. Increased time and rest breaks required for task completion secondary to fatigue.  Precautions:  Hearing/Visual Limitations: reading glasses  Medical Precautions: Fall precautions, Oxygen therapy device and L/min (HFNC 25L 40%)  Precautions Comment: telemetry, continuous pulse-ox, randolph    Vital Signs (Past 2hrs)                 Pain:  Pain Assessment  Pain Assessment: 0-10  0-10 (Numeric) Pain Score: 0 - No pain  Clinical Progression: Not changed    Objective    Cognition:  Cognition  Overall Cognitive Status: Within Functional Limits  Orientation Level: Oriented X4  Safety/Judgement: Exceptions to WFL  Insight: Mild  Impulsive: Mildly  Processing Speed: Delayed  Coordination:  Movements are Fluid and Coordinated: Yes  Activities of Daily Living: Grooming  Grooming Comments: oral hygiene tasks completed seated in bedside chair with close supervision    UE Bathing  UE Bathing Comments: Pt declined participation in bathing tasks d/t reports of task completion prior to OT session  Functional Standing Tolerance:     Bed Mobility/Transfers: Bed Mobility  Bed Mobility: Yes  Bed Mobility 1  Bed Mobility 1: Supine to sitting, Scooting  Level of Assistance 1: Close supervision  Bed Mobility Comments 1: HOB elevated to maximum height, increased time and effort required to complete.    Transfers  Transfer: Yes  Transfer 1  Trials/Comments 1: sit<>stands completed from standard EOB and chair heights with FWW and CGA- min verbal cues required for proper hand placement to increase safety and decrease risk of falls.    Therapy/Activity: Therapeutic Exercise  Therapeutic Exercise Performed: Yes  Therapeutic Exercise Activity 1: BUE exercises completed 2x10 with min resistance for following exercises: wrist flexion/  extension, pronation/supination, bicep curl, triceps extension, and shoulder press      Outcome Measures:[facility] Daily Activity  Putting on and taking off regular lower body clothing: A lot  Bathing (including washing, rinsing, drying): A lot  Putting on and taking off regular upper body clothing: A little  Toileting, which includes using toilet, bedpan or urinal: A lot  Taking care of personal grooming such as brushing teeth: A little  Eating Meals: A little  Daily Activity - Total Score: 15        Education Documentation  Body Mechanics, taught by TOMMY Del Valle at 10/17/2024  2:57 PM.  Learner: Patient  Readiness: Acceptance  Method: Explanation, Demonstration  Response: Needs Reinforcement    Home Exercise Program, taught by TOMMY Del Valle at 10/17/2024  2:57 PM.  Learner: Patient  Readiness: Acceptance  Method: Explanation, Demonstration  Response: Needs Reinforcement    ADL Training, taught by TOMMY Del Valle at 10/17/2024  2:57 PM.  Learner: Patient  Readiness: Acceptance  Method: Explanation, Demonstration  Response: Needs Reinforcement    Education Comments  Education provided on role of OT/POC, safety awareness throughout functional tasks/transfers, importance of activity/ rest routine, EC/WS techniques, and use of call light for assistance. Questions, comments and concerns addressed regarding OT.      Goals:  Encounter Problems       Encounter Problems (Active)       Bathing       LTG - Patient will utilize adaptive techniques to bathe body Min A (Progressing)       Start:  10/12/24    Expected End:  10/21/24               Dressing Upper Extremities       LTG - Patient will complete upper body dressing Mod I (Progressing)       Start:  10/12/24    Expected End:  10/21/24               Dressings Lower Extremities       LTG - Patient will dress lower body Min A (Progressing)       Start:  10/12/24    Expected End:  10/21/24               Functional Mobility       Pt will demonstrate  sitting/standing tolerance x 4 min, without significant inc in RR to complete ADL routine.  (Progressing)       Start:  10/12/24    Expected End:  11/09/24               Toileting       LTG - Patient will complete daily toileting tasks Min A (Progressing)       Start:  10/12/24    Expected End:  10/21/24

## 2024-10-17 NOTE — CARE PLAN
The patient's goals for the shift include breathe easier    The clinical goals for the shift include wean o2

## 2024-10-17 NOTE — PROGRESS NOTES
Pulmonary Progress Note    Kay Pike is a 59 y.o. female on day 8 of admission presenting with Systolic heart failure secondary to coronary artery disease.    Subjective   No acute events  Objective   Vital Signs      10/18/2024    12:20 AM 10/18/2024     4:14 AM 10/18/2024     5:02 AM 10/18/2024     8:08 AM 10/18/2024    12:19 PM 10/18/2024     3:55 PM 10/18/2024     8:42 PM   Vitals   Systolic   140 147 117 139 132   Diastolic   39 41 30 53 39   Heart Rate   70 73 70 79 74   Temp   36.6 °C (97.9 °F) 36.1 °C (97 °F) 36.6 °C (97.9 °F) 36.5 °C (97.7 °F) 36.5 °C (97.7 °F)   Resp 21 19 22 21 18 19 18   Weight (lb)   289.02       BMI   52.85 kg/m2       BSA (m2)   2.39 m2           Oxygen Therapy  SpO2: 95 %  Medical Gas Therapy: Supplemental oxygen  Medical Gas Delivery Method: High flow nasal cannula    FiO2 (%):  [40 %] 40 %  S RR:  [18] 18    Intake/Output previous 24 hours:    Intake/Output Summary (Last 24 hours) at 10/18/2024 2233  Last data filed at 10/18/2024 2042  Gross per 24 hour   Intake --   Output 3125 ml   Net -3125 ml       Physical Exam  Vitals reviewed.   Constitutional:       Appearance: Normal appearance.   HENT:      Head: Normocephalic and atraumatic.      Mouth/Throat:      Mouth: Mucous membranes are moist.   Eyes:      Extraocular Movements: Extraocular movements intact.      Pupils: Pupils are equal, round, and reactive to light.   Cardiovascular:      Rate and Rhythm: Normal rate and regular rhythm.   Pulmonary:      Effort: Pulmonary effort is normal.      Breath sounds: Normal breath sounds and air entry.   Abdominal:      General: Abdomen is flat. Bowel sounds are normal.      Palpations: Abdomen is soft.   Musculoskeletal:         General: Normal range of motion.      Cervical back: Normal range of motion and neck supple.   Skin:     General: Skin is warm and dry.   Neurological:      General: No focal deficit present.      Mental Status: She is alert and oriented to person,  place, and time. Mental status is at baseline.       ...  Lines and Tubes:  Peripheral IV 10/14/24 20 G Right;Anterior Forearm (Active)   Placement Date/Time: 10/14/24 1418   Size (Gauge): 20 G  Orientation: Right;Anterior  Location: Forearm  Site Prep: Chlorhexidine   Local Anesthetic: None  Technique: Ultrasound guidance  Placed by: Alison Reeves RN [facility]  Insertion attempts: 1  Pat...   Number of days: 2       Urethral Catheter (Active)   Placement Date: 10/10/24   Hand Hygiene Completed: Yes  Urine Returned: Yes   Number of days: 6         Scheduled medications  budesonide, 0.5 mg, nebulization, BID  clopidogrel, 75 mg, oral, Daily  dapagliflozin propanediol, 10 mg, oral, Daily  escitalopram, 10 mg, oral, Daily  furosemide, 40 mg, intravenous, q12h  insulin lispro, 0-15 Units, subcutaneous, Before meals & nightly  ipratropium-albuteroL, 3 mL, nebulization, q4h  levothyroxine, 100 mcg, oral, Daily  losartan, 50 mg, oral, Daily  methylPREDNISolone sodium succinate (PF), 20 mg, intravenous, q12h  metoprolol succinate XL, 50 mg, oral, BID  nystatin, 1 Application, Topical, BID  pantoprazole, 40 mg, oral, Daily before breakfast  perflutren protein A microsphere, 0.5 mL, intravenous, Once in imaging  rivaroxaban, 20 mg, oral, Daily with evening meal  simvastatin, 40 mg, oral, Nightly  sulfur hexafluoride microsphr, 2 mL, intravenous, Once in imaging      Continuous medications     PRN medications  PRN medications: acetaminophen, dextrose, dextrose, glucagon, glucagon, hydrOXYzine HCL, ipratropium-albuteroL, lidocaine-epinephrine, morphine, nitroglycerin, oxygen, oxygen, polyethylene glycol    Relevant Results  Results from last 7 days   Lab Units 10/18/24  0453 10/17/24  0455 10/16/24  0439   WBC AUTO x10*3/uL 15.3* 15.1* 16.6*   HEMOGLOBIN g/dL 15.2 14.4 14.4   HEMATOCRIT % 48.0* 46.2* 46.0   PLATELETS AUTO x10*3/uL 205 187 187      Results from last 7 days   Lab Units 10/18/24  0453 10/17/24  0455 10/16/24  0439    SODIUM mmol/L 137 137 137   POTASSIUM mmol/L 4.5 4.1 4.5   CHLORIDE mmol/L 90* 92* 93*   CO2 mmol/L 42* 41* 42*   BUN mg/dL 33* 32* 37*   CREATININE mg/dL 0.67 0.63 0.83   GLUCOSE mg/dL 155* 97 166*   CALCIUM mg/dL 9.1 8.7 8.9      Results from last 7 days   Lab Units 10/17/24  0441   POCT PH, ARTERIAL pH 7.43*   POCT PCO2, ARTERIAL mm Hg 70*   POCT PO2, ARTERIAL mm Hg 65*   POCT HCO3 CALCULATED, ARTERIAL mmol/L 46.5*   POCT BASE EXCESS, ARTERIAL mmol/L 18.2*     XR chest 1 view 10/15/2024    Narrative  Interpreted By:  Geovanny Price,  STUDY:  XR CHEST 1 VIEW;  10/15/2024 10:43 am    INDICATION:  Signs/Symptoms:increased O2 demands.      COMPARISON:  10/12/2025    ACCESSION NUMBER(S):  [id number]    ORDERING CLINICIAN:  MAXIMO ZAPATA    FINDINGS:          CARDIOMEDIASTINAL SILHOUETTE:  Cardiomediastinal silhouette is moderately enlarged. There is  pulmonary vascular congestion with mild edema    LUNGS:  No dense consolidation seen. No large effusion    ABDOMEN:  No remarkable upper abdominal findings.    BONES:  No acute osseous changes.    Impression  1. Moderate enlargement of the cardiac silhouette with pulmonary edema        MACRO:  None    Signed by: Geovanny Price 10/15/2024 11:14 AM  Dictation workstation:   [id number]      Patient Active Problem List   Diagnosis    Shortness of breath    Systolic heart failure secondary to coronary artery disease    COPD (chronic obstructive pulmonary disease) with acute bronchitis (Multi)    Hyperglycemia     Assessment/Plan     Acute hypoxic resp failure 2/2 flash pulmonary edema  COPD  Asthma  R/O pneumonia  STEMI - no occlusions on heart cath  CHF exacerbation  Afib RVR  HTN  HLD    - Wean HFNC  - Maintain SPO2 >92%  - Duonebs q4h  - Cont Bipap at night   - Solumedrol 40 q12h  - Empiric ATB  - Continuous cardiac monitoring per ICU protocol  - Maintain MAPS >65  - Daily EKGs prm    - Heart cath 10/10: lateral ST elevation myocardial infarction secondary to  thrombus seen on cardiac catheterization distal diagonal. No indication for intervention since the lesion is very distally and there is ZAK I flow seen in the artery which is small overall for intervention. The remainder of her coronaries appears without obstructive coronary disease. Recommend aspirin Brilinta. High intensity statin.     - Echo: EF 40-45%    -Continue lasix  -Continue xarelto  - on 6lpm  - dc to [facility] soon      Uriah Gutierrez MD

## 2024-10-17 NOTE — NURSING NOTE
Assumed care of patient. Pt currently in bed resting comfortably. No complaints voiced sat this time. Call bell and personal items within reach. Bed in low and locked position.

## 2024-10-17 NOTE — PROGRESS NOTES
Pulmonary Progress Note    Kay Pike is a 59 y.o. female on day 6 of admission presenting with Systolic heart failure secondary to coronary artery disease.    Subjective   No acute events  Objective   Vital Signs      10/16/2024     1:00 PM 10/16/2024     2:00 PM 10/16/2024     2:05 PM 10/16/2024     4:00 PM 10/16/2024     4:14 PM 10/16/2024     4:44 PM 10/16/2024     7:57 PM   Vitals   Systolic     139 150 148   Diastolic     39 50 44   Heart Rate 88 88 87 90 86     Temp      36.8 °C (98.2 °F) 36 °C (96.8 °F)   Resp 21 29 29 22 30 25        Oxygen Therapy  SpO2: 98 %  Medical Gas Therapy: Supplemental oxygen  Medical Gas Delivery Method: High flow nasal cannula    FiO2 (%):  [40 %-60 %] 40 %  S RR:  [18] 18    Intake/Output previous 24 hours:    Intake/Output Summary (Last 24 hours) at 10/16/2024 2212  Last data filed at 10/16/2024 2047  Gross per 24 hour   Intake 875 ml   Output 2555 ml   Net -1680 ml       Physical Exam  Vitals reviewed.   Constitutional:       Appearance: Normal appearance.   HENT:      Head: Normocephalic and atraumatic.      Mouth/Throat:      Mouth: Mucous membranes are moist.   Eyes:      Extraocular Movements: Extraocular movements intact.      Pupils: Pupils are equal, round, and reactive to light.   Cardiovascular:      Rate and Rhythm: Normal rate and regular rhythm.   Pulmonary:      Effort: Pulmonary effort is normal.      Breath sounds: Normal breath sounds and air entry.   Abdominal:      General: Abdomen is flat. Bowel sounds are normal.      Palpations: Abdomen is soft.   Musculoskeletal:         General: Normal range of motion.      Cervical back: Normal range of motion and neck supple.   Skin:     General: Skin is warm and dry.   Neurological:      General: No focal deficit present.      Mental Status: She is alert and oriented to person, place, and time. Mental status is at baseline.       ...  Lines and Tubes:  Peripheral IV 10/14/24 20 G Right;Anterior Forearm (Active)    Placement Date/Time: 10/14/24 1418   Size (Gauge): 20 G  Orientation: Right;Anterior  Location: Forearm  Site Prep: Chlorhexidine   Local Anesthetic: None  Technique: Ultrasound guidance  Placed by: Alison Reeves RN [facility]  Insertion attempts: 1  Pat...   Number of days: 2       Urethral Catheter (Active)   Placement Date: 10/10/24   Hand Hygiene Completed: Yes  Urine Returned: Yes   Number of days: 6         Scheduled medications  [Transfer Hold] budesonide, 0.5 mg, nebulization, BID  [Transfer Hold] clopidogrel, 75 mg, oral, Daily  dapagliflozin propanediol, 10 mg, oral, Daily  escitalopram, 10 mg, oral, Daily  furosemide, 80 mg, intravenous, Daily  insulin lispro, 0-15 Units, subcutaneous, Before meals & nightly  ipratropium-albuteroL, 3 mL, nebulization, q4h  levothyroxine, 100 mcg, oral, Daily  losartan, 50 mg, oral, Daily  [Transfer Hold] methylPREDNISolone sodium succinate (PF), 20 mg, intravenous, q12h  metoprolol succinate XL, 25 mg, oral, BID  nystatin, 1 Application, Topical, BID  perflutren protein A microsphere, 0.5 mL, intravenous, Once in imaging  rivaroxaban, 20 mg, oral, Daily with evening meal  simvastatin, 40 mg, oral, Nightly  sulfur hexafluoride microsphr, 2 mL, intravenous, Once in imaging      Continuous medications     PRN medications  PRN medications: acetaminophen, dextrose, dextrose, glucagon, glucagon, hydrOXYzine HCL, ipratropium-albuteroL, lidocaine-epinephrine, morphine, nitroglycerin, oxygen, oxygen, polyethylene glycol    Relevant Results  Results from last 7 days   Lab Units 10/16/24  0439 10/15/24  0444 10/14/24  0456   WBC AUTO x10*3/uL 16.6* 15.6* 18.6*   HEMOGLOBIN g/dL 14.4 14.4 14.1   HEMATOCRIT % 46.0 46.0 43.7   PLATELETS AUTO x10*3/uL 187 188 192      Results from last 7 days   Lab Units 10/16/24  0439 10/15/24  0444 10/15/24  0037   SODIUM mmol/L 137 138 137   POTASSIUM mmol/L 4.5 4.8 4.8   CHLORIDE mmol/L 93* 97* 98   CO2 mmol/L 42* 39* 37*   BUN mg/dL 37* 38* 38*    CREATININE mg/dL 0.83 0.74 0.77   GLUCOSE mg/dL 166* 147* 104*   CALCIUM mg/dL 8.9 8.8 8.7      Results from last 7 days   Lab Units 10/11/24  1332   POCT PH, ARTERIAL pH 7.39   POCT PCO2, ARTERIAL mm Hg 55*   POCT PO2, ARTERIAL mm Hg 101*   POCT HCO3 CALCULATED, ARTERIAL mmol/L 33.3*   POCT BASE EXCESS, ARTERIAL mmol/L 6.6*     XR chest 1 view 10/15/2024    Narrative  Interpreted By:  Geovanny Price,  STUDY:  XR CHEST 1 VIEW;  10/15/2024 10:43 am    INDICATION:  Signs/Symptoms:increased O2 demands.      COMPARISON:  10/12/2025    ACCESSION NUMBER(S):  [id number]    ORDERING CLINICIAN:  MAXIMO ZAPATA    FINDINGS:          CARDIOMEDIASTINAL SILHOUETTE:  Cardiomediastinal silhouette is moderately enlarged. There is  pulmonary vascular congestion with mild edema    LUNGS:  No dense consolidation seen. No large effusion    ABDOMEN:  No remarkable upper abdominal findings.    BONES:  No acute osseous changes.    Impression  1. Moderate enlargement of the cardiac silhouette with pulmonary edema        MACRO:  None    Signed by: Geovanny Price 10/15/2024 11:14 AM  Dictation workstation:   [id number]      Patient Active Problem List   Diagnosis    Shortness of breath    Systolic heart failure secondary to coronary artery disease    COPD (chronic obstructive pulmonary disease) with acute bronchitis (Multi)    Hyperglycemia     Assessment/Plan     Acute hypoxic resp failure 2/2 flash pulmonary edema  COPD  Asthma  R/O pneumonia  STEMI - no occlusions on heart cath  CHF exacerbation  Afib RVR  HTN  HLD    - Maintain SPO2 >92%  - Duonebs q4h  - Cont Bipap at night and HFNC during day  - Solumedrol 40 q12h  - Empiric ATB  - Continuous cardiac monitoring per ICU protocol  - Maintain MAPS >65  - Daily EKGs prm    - Heart cath 10/10: lateral ST elevation myocardial infarction secondary to thrombus seen on cardiac catheterization distal diagonal. No indication for intervention since the lesion is very distally and there  is ZAK I flow seen in the artery which is small overall for intervention. The remainder of her coronaries appears without obstructive coronary disease. Recommend aspirin Brilinta. High intensity statin.     - Echo: EF 40-45%    -Continue lasix  -Continue xarelto    - ABG and CXR in AM      Uriah Gutierrez MD

## 2024-10-17 NOTE — PROGRESS NOTES
Physical Therapy    Physical Therapy Treatment    Patient Name: Kay Pike  MRN: [id number]  Department: [address]  Room: 16/16  Today's Date: 10/17/2024  Time Calculation  Start Time: 1410  Stop Time: 1433  Time Calculation (min): 23 min         Assessment/Plan   PT Assessment  End of Session Communication: Bedside nurse  Assessment Comment: Pt still requiring HF 02 to maintain therapeutic sp02 levels. Increased time to complete therapeutic exercises to prevent SOB.  End of Session Patient Position: Up in chair, Alarm off, not on at start of session  PT Plan  Inpatient/Swing Bed or Outpatient: Inpatient  PT Plan  Treatment/Interventions: Bed mobility, Transfer training, Gait training, Strengthening, Therapeutic exercise, Therapeutic activity, Balance training, Endurance training  PT Plan: Ongoing PT  PT Frequency: 6 times per week  PT Discharge Recommendations: Moderate intensity level of continued care  Equipment Recommended upon Discharge: Other (comment) (if homegoing, consider Jr FWW)  PT Recommended Transfer Status: Assist x1, Assistive device (Jr FWW)  PT - OK to Discharge: Yes      General Visit Information:   PT  Visit  PT Received On: 10/17/24  General  Prior to Session Communication: Bedside nurse  Patient Position Received: Up in chair, Alarm off, not on at start of session  General Comment: Pt cleared for PT by nursing. Pt agreeable to PT services.    Subjective   Precautions:  Precautions  Medical Precautions: Fall precautions, Oxygen therapy device and L/min (HFNC 25L 40%)  Precautions Comment: + telemetry, + continuous pulse-ox, + randolph      Objective   Pain:  Pain Assessment  Pain Assessment: 0-10  0-10 (Numeric) Pain Score: 0 - No pain  Cognition:  Cognition  Overall Cognitive Status: Within Functional Limits    Postural Control:  Static Sitting Balance  Static Sitting-Balance Support: Bilateral upper extremity supported, Feet supported  Static Sitting-Level of Assistance: Close  supervision  Static Sitting-Comment/Number of Minutes: good seated static balance    Treatments:  Therapeutic Exercise  Therapeutic Exercise Performed: Yes  Therapeutic Exercise Activity 1: Seated B marches 2x10  Therapeutic Exercise Activity 2: Seated B LAQ 2x10  Therapeutic Exercise Activity 3: Seated B hip abd/add 2x10  Therapeutic Exercise Activity 4: Seated B ankle pumps 2x10    Outcome Measures:  [facility] Basic Mobility  Turning from your back to your side while in a flat bed without using bedrails: A little  Moving from lying on your back to sitting on the side of a flat bed without using bedrails: A little  Moving to and from bed to chair (including a wheelchair): A little  Standing up from a chair using your arms (e.g. wheelchair or bedside chair): A little  To walk in hospital room: A lot  Climbing 3-5 steps with railing: Total  Basic Mobility - Total Score: 15       Encounter Problems       Encounter Problems (Active)       PT Problem       Pt will transition supine<>sit using hospital bed with mod I.  (Not Progressing)       Start:  10/14/24    Expected End:  11/10/24            Pt will transfer sit<>stand with FWW & mod I.  (Not Progressing)       Start:  10/14/24    Expected End:  11/10/24            Pt will ambulate >/=50 ft with FWW & mod I.  (Not Progressing)       Start:  10/14/24    Expected End:  11/10/24            Pt will demonstrate oxygen conservation strategies (eg, pursed lip breathing technique, no verbalization, etc) during exertive functional mobility/activities with at most 1 verbal cue (distant S). (Progressing)       Start:  10/14/24    Expected End:  11/10/24

## 2024-10-17 NOTE — PROGRESS NOTES
10/17/24 1030   Discharge Planning   Expected Discharge Disposition SNF   Does the patient need discharge transport arranged? Yes   RoundTrip coordination needed? Yes     TCC spoke to patient states she is agreeable to SNF at this time. Would like to review list with her sister who is visiting today   TCC will follow for choices     ** do not discharge without speaking to care coordination**

## 2024-10-17 NOTE — NURSING NOTE
Pt currently sitting in chair in room visiting with family. Hi-Isaías O2 via NC in place. Mcnamara patent and draining yellow, clear urine. No complaints pain or discomfort at this time. Call bell and personal items within reach.

## 2024-10-17 NOTE — PROGRESS NOTES
Kay Pike is a 59 y.o. female on day 7 of admission presenting with Systolic heart failure secondary to coronary artery disease.      Subjective   Has been in the ICU since October 10 with acute respiratory failure attributed to CHF and COPD.  She has had an NSTEMI.  Has had a cardiac cath, no intervention.  Cardiology has been following.  She also had issues with A-fib.  She is on an appropriate cardiac regimen currently.  She has been diuresed.  Her EF is 40% .  She is also been on steroids and antibiotics.  Transferred from ICU on 10/15/24  10/17/24:  Patient did not use BiPAP last night, she did not think she needed it.  She was on high flow.  Blood gas done at 4 AM demonstrates pCO2 70.  Patient had a run of V. tach this morning, 17 beats   Patient denies shortness of breath or chest pain.    Objective   Alert oriented  Heart regular rate and rhythm  Lungs clear to auscultation  Abdomen soft nontender nondistended  Extremities she has very obvious lymphedema there is some pitting her legs are erythematous bilaterally from the dorsum of her feet up to her mid shin.  There is a superficial ulceration on the left shin.  No hyperthermia  Neuro cranial nerves intact good tone strength in arms and legs  Last Recorded Vitals  BP (!) 144/40 (BP Location: Right arm, Patient Position: Lying)   Pulse 79   Temp 36.2 °C (97.2 °F) (Temporal)   Resp 22   Wt 114 kg (251 lb 5.2 oz)   SpO2 92%   Intake/Output last 3 Shifts:    Intake/Output Summary (Last 24 hours) at 10/17/2024 1010  Last data filed at 10/17/2024 0900  Gross per 24 hour   Intake 820 ml   Output 2655 ml   Net -1835 ml       Admission Weight  Weight: 127 kg (279 lb) (10/10/24 1903)    Daily Weight  10/17/24 : 114 kg (251 lb 5.2 oz)    Image Results  XR chest 1 view  Narrative: Interpreted By:  Geovanny Price,   STUDY:  XR CHEST 1 VIEW;  10/15/2024 10:43 am      INDICATION:  Signs/Symptoms:increased O2 demands.          COMPARISON:  10/12/2025       ACCESSION NUMBER(S):  [id number]      ORDERING CLINICIAN:  MAXIMO ZAPATA      FINDINGS:                  CARDIOMEDIASTINAL SILHOUETTE:  Cardiomediastinal silhouette is moderately enlarged. There is  pulmonary vascular congestion with mild edema      LUNGS:  No dense consolidation seen. No large effusion      ABDOMEN:  No remarkable upper abdominal findings.      BONES:  No acute osseous changes.      Impression: 1. Moderate enlargement of the cardiac silhouette with pulmonary edema              MACRO:  None      Signed by: Geovanny Price 10/15/2024 11:14 AM  Dictation workstation:   [id number]  Electrocardiogram, 12-lead PRN ACS symptoms  Sinus tachycardia  Left anterior fascicular block  Lateral infarct , age undetermined  Abnormal ECG    Confirmed by Yousif Howard ([id number]) on 10/15/2024 9:13:18 AM      Physical Exam    Relevant Results               Assessment/Plan             Assessment & Plan  Shortness of breath  She has acute respiratory failure due to combination of COPD and CHF.  See below.  Now that she is coming out of the ICU I will consult pulmonary.  She is still requiring a lot of respiratory support  Systolic heart failure secondary to coronary artery disease  This seems to be her primary problem.  She is now being rate controlled terms of her A-fib.  She is on Lasix milligrams IV per day.  At some point we will switch her to p.o. Lasix and add spironolactone.  Will get another chest x-ray tomorrow.  Appreciate cardiology input.  COPD (chronic obstructive pulmonary disease) with acute bronchitis (Multi)  She does not use home oxygen.  I think we can cut back on the steroids.  Will lower the Solu-Medrol to 20 mg IV twice a day.  She has been getting Zosyn.  Is supposed to stop it 5 days so I will let that run out.  Hyperglycemia  Need to check hemoglobin A1c.  Does not have a history of diabetes but she has been on high-dose steroids.    She is somewhat obese and has lymphedema.  She is got  superficial ulcer of her buttocks and her shin.  Wound care as ordered.  Will ask for a specialty mattress.  Will keep the Mcnamara in for now.  It seems obvious to me that she will need some time in a rehab facility.  We can start talking about that although she is not ready for discharge.      10/17/24:      Patient still requires high flow oxygen, currently on 30 L.  Chest x-ray done this morning, reviewed: Demonstrates about the same vascular congestion and cardiomegaly.  Increase Lasix    Nonsustained V. tach.  Potassium and magnesium levels are good.  Patient is on metoprolol 25 mg twice a day.  Discussed with Dr. You: He will increase metoprolol.    Leukocytosis, stress related, more likely.  Will monitor off antibiotics.     Lymphedema, chronic.  Stasis dermatitis.  No evidence of cellulitis on exam    Paroxysmal A-fib, currently in sinus rhythm.    Will add Protonix since patient is on Plavix and Xarelto    CO2 retention.  Will encouraged to use BiPAP during nighttime    Eventual plan to discharge to rehab once stable.  Not stable for discharge yet.  Divya Boggs MD

## 2024-10-17 NOTE — PROGRESS NOTES
Spiritual Care Visit    Clinical Encounter Type  Visited With: Patient not available  Routine Visit: Follow-up  Continue Visiting: Yes     Stephane Felder

## 2024-10-17 NOTE — PROGRESS NOTES
"Kay Pike is a 59 y.o. female on day 7 of admission presenting with Systolic heart failure secondary to coronary artery disease.    Subjective   Resting comfortably.  Remains on assistance significant amount of supplemental oxygen via nasal cannula       Objective     Physical Exam   Gen: NAD   Neck: no JVD, carotid upstroke is brisk and without delay   Heart: rrr, s1s2+ no mrg   Lungs: Scattered rhonchi   Ext: warm nonpitting edema  Last Recorded Vitals  Blood pressure (!) 144/40, pulse 79, temperature 36.2 °C (97.2 °F), temperature source Temporal, resp. rate 22, height 1.575 m (5' 2.01\"), weight 114 kg (251 lb 5.2 oz), SpO2 92%.  Intake/Output last 3 Shifts:  I/O last 3 completed shifts:  In: 1285 (11.3 mL/kg) [P.O.:1120; I.V.:15 (0.1 mL/kg); IV Piggyback:150]  Out: 3805 (33.4 mL/kg) [Urine:3805 (0.9 mL/kg/hr)]  Weight: 114 kg         Assessment/Plan   Assessment & Plan  Shortness of breath    Systolic heart failure secondary to coronary artery disease    COPD (chronic obstructive pulmonary disease) with acute bronchitis (Multi)    Hyperglycemia     lateral ST elevation myocardial infarction secondary to thrombus seen on cardiac catheterization distal diagonal 1.  No indication for intervention since the lesion is very distally and the artery diameter is small overall for intervention.  The remainder of her coronaries appears without obstructive coronary disease.  Recommend aspirin Brilinta for 12 months.  High intensity statin.  The echo showed ejection fraction of 45% with distal lateral apical anterior wall hypokinesis consistent with the territory of distal diagonal 1 which is almost dual system.     2.  Acute on chronic respiratory failure secondary to combination of COPD exacerbation and acute decompensated heart failure diastolic dysfunction.  We will decrease losartan to 50 mg oral daily.  Start on metoprolol succinate 25 mg oral daily.  Continue diuresis.  Will monitor.  Will start Farxiga 10 mg " oral daily.      3.  Hypertension.  We will decrease losartan to 50 mg oral daily.  Start metoprolol succinate 25 mg oral daily.       4.  Hyperlipidemia continue high intensity statin.     5.  Morbid obesity.     Patient stable from my standpoint.  Recommend her to be on furosemide 80 mg oral daily, Farxiga 10 mg oral daily, aspirin 81 mg oral daily, Brilinta 90 mg oral twice daily, atorvastatin 80 mg oral daily, metoprolol succinate 50 mg oral daily, losartan 50 mg oral daily.  Follow-up in my office in 1 to 2 weeks.  Will sign off please call with any question     10/14: Be consulted for rapid heart rates initially suspected to be SVT.  Review of telemetry data as well as review of EKG in my opinion reveals a rapid atrial fibrillation with heart rates in the 160s.  Patient does not have a history of atrial fibrillation.  Most recent echocardiogram shows reduced ejection fraction of 40 to 45% with no significant atrial dilatation or abnormal valvular function.  Patient is currently experiencing an exacerbation of COPD.  She did undergo cardiac catheterization which revealed:  thrombus seen on cardiac catheterization distal diagonal 1.  No indication for intervention since the lesion is very distally and the artery diameter is small overall for intervention.  Since cardiac catheterization the intensivist team focused on fluid volume management as she appeared to be fluid overloaded on admission as well exacerbation of COPD.  On assessment today her lung sounds are rhonchorous throughout.  She has 2-3 word conversational dyspnea.  She remains with lymphedema to bilateral lower extremities.  At this point would continue with oral metoprolol, however would increase this to 25 mg p.o. twice daily.  Noting reduced ejection fraction of 40% would like to try to avoid diltiazem at this time.  Will utilize digoxin 500 mcg x 1 followed by 250 mcg x 1 4 hours later.  Would plan to start the patient on 125 mcg of digoxin  tomorrow morning.  Concerning anticoagulation patient's [id number] equals 4.  Anticoagulation is generally recommended.  At this point she has been placed on aspirin as well as ticagrelor after her recent cardiac catheterization with no stent placement.  Generally triple therapy is not recommended long term.  For today we will continue with her oral aspirin and Brilinta.  Will reevaluate tomorrow to assess burden of atrial fibrillation and at that point if she remains in atrial fibrillation we will likely stop aspirin and continue with ticagrelor and apixaban.  Time my assessment she does have rhonchorous breath sounds throughout.  She continues to receive IV furosemide which I agree with.  Will follow with you.      10/15: Improved over the past 24 hours.  Patient was given IV digoxin yesterday for  first diagnosed rapid atrial fibrillation.  This was very effective and the patient spontaneously converted back to a sinus rhythm at approximately 6 PM yesterday and has remained in a sinus rhythm since that time.  Additionally her beta-blocker was increased.  At this point we will trial discontinuation of digoxin and attempt to continue with beta-blocker alone.  Concerning anticoagulation as noted above [id number] equals 4.  Anticoagulation in this patient is recommended.  She is currently on aspirin as well as ticagrelor.  Will stop oral aspirin and start the patient on rivaroxaban for stroke risk reduction.  Lung sounds have significantly improved with BiPAP overnight.  Overall improving.  Would like to follow 1 day further.  Will follow with you.     8/16: Remains in sinus rhythm.  Continue metoprolol.  Continue oral anticoagulation for stroke risk reduction.  Recent NSTEMI from first diagonal thromboembolism.  That being said coronary angiography does show some element of atherosclerotic disease.  I am switching her antiplatelet from Brilinta to Plavix.  Continue statin.  Reasonable to transfer to stepdown  today.  Will follow.    8/17: 16 beat run of NSVT this morning asymptomatic. Will increase metoprolol. Continue GDMT. Getting IV lasix this AM, reasonable. Will follow             hSa You,

## 2024-10-18 LAB
ALBUMIN SERPL BCP-MCNC: 3.3 G/DL (ref 3.4–5)
ALP SERPL-CCNC: 64 U/L (ref 33–110)
ALT SERPL W P-5'-P-CCNC: 24 U/L (ref 7–45)
ANION GAP SERPL CALCULATED.3IONS-SCNC: 10 MMOL/L (ref 10–20)
AST SERPL W P-5'-P-CCNC: 14 U/L (ref 9–39)
BASOPHILS # BLD AUTO: 0.02 X10*3/UL (ref 0–0.1)
BASOPHILS NFR BLD AUTO: 0.1 %
BILIRUB SERPL-MCNC: 0.9 MG/DL (ref 0–1.2)
BUN SERPL-MCNC: 33 MG/DL (ref 6–23)
CALCIUM SERPL-MCNC: 9.1 MG/DL (ref 8.6–10.3)
CHLORIDE SERPL-SCNC: 90 MMOL/L (ref 98–107)
CO2 SERPL-SCNC: 42 MMOL/L (ref 21–32)
CREAT SERPL-MCNC: 0.67 MG/DL (ref 0.5–1.05)
EGFRCR SERPLBLD CKD-EPI 2021: >90 ML/MIN/1.73M*2
EOSINOPHIL # BLD AUTO: 0.01 X10*3/UL (ref 0–0.7)
EOSINOPHIL NFR BLD AUTO: 0.1 %
ERYTHROCYTE [DISTWIDTH] IN BLOOD BY AUTOMATED COUNT: 17.8 % (ref 11.5–14.5)
GLUCOSE BLD MANUAL STRIP-MCNC: 105 MG/DL (ref 74–99)
GLUCOSE BLD MANUAL STRIP-MCNC: 136 MG/DL (ref 74–99)
GLUCOSE BLD MANUAL STRIP-MCNC: 175 MG/DL (ref 74–99)
GLUCOSE BLD MANUAL STRIP-MCNC: 202 MG/DL (ref 74–99)
GLUCOSE SERPL-MCNC: 155 MG/DL (ref 74–99)
HCT VFR BLD AUTO: 48 % (ref 36–46)
HGB BLD-MCNC: 15.2 G/DL (ref 12–16)
IMM GRANULOCYTES # BLD AUTO: 0.13 X10*3/UL (ref 0–0.7)
IMM GRANULOCYTES NFR BLD AUTO: 0.8 % (ref 0–0.9)
LYMPHOCYTES # BLD AUTO: 1.14 X10*3/UL (ref 1.2–4.8)
LYMPHOCYTES NFR BLD AUTO: 7.5 %
MAGNESIUM SERPL-MCNC: 2.35 MG/DL (ref 1.6–2.4)
MCH RBC QN AUTO: 26.5 PG (ref 26–34)
MCHC RBC AUTO-ENTMCNC: 31.7 G/DL (ref 32–36)
MCV RBC AUTO: 84 FL (ref 80–100)
MONOCYTES # BLD AUTO: 0.65 X10*3/UL (ref 0.1–1)
MONOCYTES NFR BLD AUTO: 4.2 %
NEUTROPHILS # BLD AUTO: 13.35 X10*3/UL (ref 1.2–7.7)
NEUTROPHILS NFR BLD AUTO: 87.3 %
NRBC BLD-RTO: 0 /100 WBCS (ref 0–0)
PHOSPHATE SERPL-MCNC: 4 MG/DL (ref 2.5–4.9)
PLATELET # BLD AUTO: 205 X10*3/UL (ref 150–450)
POTASSIUM SERPL-SCNC: 4.5 MMOL/L (ref 3.5–5.3)
PROT SERPL-MCNC: 6.4 G/DL (ref 6.4–8.2)
RBC # BLD AUTO: 5.73 X10*6/UL (ref 4–5.2)
SODIUM SERPL-SCNC: 137 MMOL/L (ref 136–145)
WBC # BLD AUTO: 15.3 X10*3/UL (ref 4.4–11.3)

## 2024-10-18 PROCEDURE — 2500000001 HC RX 250 WO HCPCS SELF ADMINISTERED DRUGS (ALT 637 FOR MEDICARE OP): Performed by: INTERNAL MEDICINE

## 2024-10-18 PROCEDURE — 94640 AIRWAY INHALATION TREATMENT: CPT

## 2024-10-18 PROCEDURE — 2500000002 HC RX 250 W HCPCS SELF ADMINISTERED DRUGS (ALT 637 FOR MEDICARE OP, ALT 636 FOR OP/ED): Performed by: INTERNAL MEDICINE

## 2024-10-18 PROCEDURE — 97530 THERAPEUTIC ACTIVITIES: CPT | Mod: GP,CQ

## 2024-10-18 PROCEDURE — 85025 COMPLETE CBC W/AUTO DIFF WBC: CPT | Performed by: INTERNAL MEDICINE

## 2024-10-18 PROCEDURE — 82947 ASSAY GLUCOSE BLOOD QUANT: CPT

## 2024-10-18 PROCEDURE — 94660 CPAP INITIATION&MGMT: CPT

## 2024-10-18 PROCEDURE — 99232 SBSQ HOSP IP/OBS MODERATE 35: CPT | Performed by: INTERNAL MEDICINE

## 2024-10-18 PROCEDURE — 9420000001 HC RT PATIENT EDUCATION 5 MIN

## 2024-10-18 PROCEDURE — 83735 ASSAY OF MAGNESIUM: CPT | Performed by: INTERNAL MEDICINE

## 2024-10-18 PROCEDURE — 97110 THERAPEUTIC EXERCISES: CPT | Mod: GP,CQ

## 2024-10-18 PROCEDURE — 2060000001 HC INTERMEDIATE ICU ROOM DAILY

## 2024-10-18 PROCEDURE — 2500000004 HC RX 250 GENERAL PHARMACY W/ HCPCS (ALT 636 FOR OP/ED): Performed by: INTERNAL MEDICINE

## 2024-10-18 PROCEDURE — 80053 COMPREHEN METABOLIC PANEL: CPT | Performed by: INTERNAL MEDICINE

## 2024-10-18 PROCEDURE — 99233 SBSQ HOSP IP/OBS HIGH 50: CPT | Performed by: INTERNAL MEDICINE

## 2024-10-18 PROCEDURE — 84100 ASSAY OF PHOSPHORUS: CPT | Performed by: INTERNAL MEDICINE

## 2024-10-18 PROCEDURE — 36415 COLL VENOUS BLD VENIPUNCTURE: CPT | Performed by: INTERNAL MEDICINE

## 2024-10-18 RX ORDER — FUROSEMIDE 10 MG/ML
40 INJECTION INTRAMUSCULAR; INTRAVENOUS EVERY 12 HOURS
Status: DISCONTINUED | OUTPATIENT
Start: 2024-10-18 | End: 2024-10-23

## 2024-10-18 RX ADMIN — ESCITALOPRAM OXALATE 10 MG: 10 TABLET ORAL at 08:43

## 2024-10-18 RX ADMIN — IPRATROPIUM BROMIDE AND ALBUTEROL SULFATE 3 ML: 2.5; .5 SOLUTION RESPIRATORY (INHALATION) at 22:09

## 2024-10-18 RX ADMIN — IPRATROPIUM BROMIDE AND ALBUTEROL SULFATE 3 ML: 2.5; .5 SOLUTION RESPIRATORY (INHALATION) at 04:12

## 2024-10-18 RX ADMIN — RIVAROXABAN 20 MG: 20 TABLET, FILM COATED ORAL at 16:31

## 2024-10-18 RX ADMIN — METHYLPREDNISOLONE SODIUM SUCCINATE 20 MG: 40 INJECTION, POWDER, FOR SOLUTION INTRAMUSCULAR; INTRAVENOUS at 23:13

## 2024-10-18 RX ADMIN — BUDESONIDE INHALATION 0.5 MG: 0.5 SUSPENSION RESPIRATORY (INHALATION) at 22:09

## 2024-10-18 RX ADMIN — IPRATROPIUM BROMIDE AND ALBUTEROL SULFATE 3 ML: 2.5; .5 SOLUTION RESPIRATORY (INHALATION) at 07:44

## 2024-10-18 RX ADMIN — FUROSEMIDE 40 MG: 10 INJECTION, SOLUTION INTRAMUSCULAR; INTRAVENOUS at 23:14

## 2024-10-18 RX ADMIN — CLOPIDOGREL BISULFATE 75 MG: 75 TABLET ORAL at 08:43

## 2024-10-18 RX ADMIN — LEVOTHYROXINE SODIUM 100 MCG: 0.1 TABLET ORAL at 06:05

## 2024-10-18 RX ADMIN — IPRATROPIUM BROMIDE AND ALBUTEROL SULFATE 3 ML: .5; 2.5 SOLUTION RESPIRATORY (INHALATION) at 12:10

## 2024-10-18 RX ADMIN — IPRATROPIUM BROMIDE AND ALBUTEROL SULFATE 3 ML: 2.5; .5 SOLUTION RESPIRATORY (INHALATION) at 16:00

## 2024-10-18 RX ADMIN — LOSARTAN POTASSIUM 50 MG: 50 TABLET, FILM COATED ORAL at 08:43

## 2024-10-18 RX ADMIN — NYSTATIN 1 APPLICATION: 100000 POWDER TOPICAL at 21:05

## 2024-10-18 RX ADMIN — SIMVASTATIN 40 MG: 40 TABLET, FILM COATED ORAL at 21:00

## 2024-10-18 RX ADMIN — METOPROLOL SUCCINATE 50 MG: 50 TABLET, EXTENDED RELEASE ORAL at 08:43

## 2024-10-18 RX ADMIN — INSULIN LISPRO 6 UNITS: 100 INJECTION, SOLUTION INTRAVENOUS; SUBCUTANEOUS at 16:32

## 2024-10-18 RX ADMIN — FUROSEMIDE 60 MG: 10 INJECTION, SOLUTION INTRAMUSCULAR; INTRAVENOUS at 11:01

## 2024-10-18 RX ADMIN — DAPAGLIFLOZIN 10 MG: 10 TABLET, FILM COATED ORAL at 08:43

## 2024-10-18 RX ADMIN — METHYLPREDNISOLONE SODIUM SUCCINATE 20 MG: 40 INJECTION, POWDER, FOR SOLUTION INTRAMUSCULAR; INTRAVENOUS at 11:56

## 2024-10-18 RX ADMIN — IPRATROPIUM BROMIDE AND ALBUTEROL SULFATE 3 ML: 2.5; .5 SOLUTION RESPIRATORY (INHALATION) at 12:08

## 2024-10-18 RX ADMIN — BUDESONIDE INHALATION 0.5 MG: 0.5 SUSPENSION RESPIRATORY (INHALATION) at 07:45

## 2024-10-18 RX ADMIN — NYSTATIN 1 APPLICATION: 100000 POWDER TOPICAL at 08:52

## 2024-10-18 RX ADMIN — METOPROLOL SUCCINATE 50 MG: 50 TABLET, EXTENDED RELEASE ORAL at 21:00

## 2024-10-18 RX ADMIN — INSULIN LISPRO 3 UNITS: 100 INJECTION, SOLUTION INTRAVENOUS; SUBCUTANEOUS at 12:06

## 2024-10-18 RX ADMIN — PANTOPRAZOLE SODIUM 40 MG: 40 TABLET, DELAYED RELEASE ORAL at 06:05

## 2024-10-18 ASSESSMENT — COGNITIVE AND FUNCTIONAL STATUS - GENERAL
CLIMB 3 TO 5 STEPS WITH RAILING: TOTAL
DRESSING REGULAR UPPER BODY CLOTHING: A LOT
DRESSING REGULAR UPPER BODY CLOTHING: A LOT
DRESSING REGULAR LOWER BODY CLOTHING: A LOT
PERSONAL GROOMING: A LOT
WALKING IN HOSPITAL ROOM: A LOT
MOBILITY SCORE: 15
HELP NEEDED FOR BATHING: A LOT
DAILY ACTIVITIY SCORE: 12
HELP NEEDED FOR BATHING: A LOT
PERSONAL GROOMING: A LOT
TOILETING: A LOT
MOVING TO AND FROM BED TO CHAIR: A LITTLE
TURNING FROM BACK TO SIDE WHILE IN FLAT BAD: A LITTLE
MOBILITY SCORE: 12
TURNING FROM BACK TO SIDE WHILE IN FLAT BAD: A LOT
MOVING FROM LYING ON BACK TO SITTING ON SIDE OF FLAT BED WITH BEDRAILS: A LOT
MOVING FROM LYING ON BACK TO SITTING ON SIDE OF FLAT BED WITH BEDRAILS: A LITTLE
EATING MEALS: A LOT
STANDING UP FROM CHAIR USING ARMS: A LITTLE
MOVING TO AND FROM BED TO CHAIR: A LOT
CLIMB 3 TO 5 STEPS WITH RAILING: A LOT
DRESSING REGULAR LOWER BODY CLOTHING: A LOT
WALKING IN HOSPITAL ROOM: A LOT
STANDING UP FROM CHAIR USING ARMS: A LOT
TOILETING: A LOT

## 2024-10-18 ASSESSMENT — PAIN SCALES - WONG BAKER: WONGBAKER_NUMERICALRESPONSE: NO HURT

## 2024-10-18 ASSESSMENT — PAIN SCALES - GENERAL
PAINLEVEL_OUTOF10: 0 - NO PAIN

## 2024-10-18 ASSESSMENT — PAIN - FUNCTIONAL ASSESSMENT: PAIN_FUNCTIONAL_ASSESSMENT: 0-10

## 2024-10-18 NOTE — CONSULTS
"Nutrition Assessement Note    Nutrition Assessment    Reason for Assessment: Length of stay    Spoke with pt at bedside. Pt reported that she has been eating well. Pt stated that she is unsure of weight changes due to fluid retention. Discussed heart healthy diet and provided handout.     Reason for Hospital Admission:  Kay Pike is a 59 y.o. female who is admitted for systolic heart failure.     No past medical history on file.   Past Surgical History:   Procedure Laterality Date    CARDIAC CATHETERIZATION N/A 10/10/2024    Procedure: Left Heart Cath, No LV;  Surgeon: René Kohli MD;  Location: [facility] Cardiac [facility] Lab;  Service: Cardiovascular;  Laterality: N/A;       Nutrition History:  Food and Nutrient History: pt reported good PO intake  Energy Intake: Good > 75 %     Food Allergies/Intolerances:  None  GI Symptoms: Constipation  Oral Problems: None    Anthropometrics:  Ht: 157.5 cm (5' 2.01\"), Wt: 131 kg (289 lb 0.4 oz), BMI: 52.85  IBW/kg (Dietitian Calculated): 50 kg  Percent of IBW: 263 %  Adjusted Body Weight (kg): 70.45 kg    Weight Change:  Daily Weight  10/18/24 : 131 kg (289 lb 0.4 oz)  09/13/24 : 127 kg (279 lb 6.4 oz)  08/02/24 : 127 kg (279 lb)  08/15/23 : 129 kg (284 lb 6.4 oz)  04/24/23 : 127 kg (280 lb)  02/23/23 : 132 kg (290 lb)  11/28/22 : 127 kg (280 lb)  08/25/22 : 138 kg (305 lb)  07/06/22 : 134 kg (296 lb)  06/14/22 : 134 kg (296 lb 6.4 oz)     Weight History / % Weight Change: chart shows stable weight             Nutrition Focused Physical Exam Findings:                       Nutrition Significant Labs:  Lab Results   Component Value Date    WBC 15.3 (H) 10/18/2024    HGB 15.2 10/18/2024    HCT 48.0 (H) 10/18/2024     10/18/2024    CHOL 125 (L) 03/03/2021    TRIG 73 03/03/2021    HDL 38 (L) 03/03/2021    LDLDIRECT 110 08/08/2018    ALT 24 10/18/2024    AST 14 10/18/2024     10/18/2024    K 4.5 10/18/2024    CL 90 (L) 10/18/2024    CREATININE 0.67 10/18/2024    BUN 33 " (H) 10/18/2024    CO2 42 (HH) 10/18/2024    TSH 3.83 01/29/2020    INR 1.1 10/10/2024    HGBA1C 6.7 (H) 10/16/2024    ALBUR 12 02/18/2019     Nutrition Specific Medications:  budesonide, 0.5 mg, nebulization, BID  clopidogrel, 75 mg, oral, Daily  dapagliflozin propanediol, 10 mg, oral, Daily  escitalopram, 10 mg, oral, Daily  furosemide, 40 mg, intravenous, q12h  insulin lispro, 0-15 Units, subcutaneous, Before meals & nightly  ipratropium-albuteroL, 3 mL, nebulization, q4h  levothyroxine, 100 mcg, oral, Daily  losartan, 50 mg, oral, Daily  methylPREDNISolone sodium succinate (PF), 20 mg, intravenous, q12h  metoprolol succinate XL, 50 mg, oral, BID  nystatin, 1 Application, Topical, BID  pantoprazole, 40 mg, oral, Daily before breakfast  perflutren protein A microsphere, 0.5 mL, intravenous, Once in imaging  rivaroxaban, 20 mg, oral, Daily with evening meal  simvastatin, 40 mg, oral, Nightly  sulfur hexafluoride microsphr, 2 mL, intravenous, Once in imaging        Dietary Orders (From admission, onward)       Start     Ordered    10/16/24 0850  May Participate in Room Service  ( ROOM SERVICE MAY PARTICIPATE)  Once        Question:  .  Answer:  Yes    10/16/24 0849    10/12/24 1045  Adult diet Cardiac; 70 gm fat; 2 - 3 grams Sodium  Diet effective now        Question Answer Comment   Diet type Cardiac    Fat restriction: 70 gm fat    Sodium restriction: 2 - 3 grams Sodium        10/12/24 1045                    Estimated Needs:   Estimated Energy Needs  Total Energy Estimated Needs (kCal): 1761 kCal  Total Estimated Energy Need per Day (kCal/kg): 25 kCal/kg  Method for Estimating Needs: ABW    Estimated Protein Needs  Total Protein Estimated Needs (g):  ()  Total Protein Estimated Needs (g/kg):  (1-1.5)  Method for Estimating Needs: ABW    Estimated Fluid Needs  Total Fluid Estimated Needs (mL): 1761 mL  Method for Estimating Needs: 1 mL/kcal        Nutrition Diagnosis   Nutrition Diagnosis:       Nutrition  Diagnosis  Patient has Nutrition Diagnosis: Yes  Diagnosis Status (1): New  Nutrition Diagnosis 1: Increased nutrient needs  Related to (1): increased demand  As Evidenced by (1): CHF  Additional Nutrition Diagnosis: Diagnosis 2  Diagnosis Status (2): New  Nutrition Diagnosis 2: Food and nutrition related knowledge deficit  Related to (2): needs review of diet education  As Evidenced by (2): conditions associated with diagnosis       Nutrition Interventions/Recommendations   Nutrition Interventions and Recommendations:    Nutrition Prescription:  Individualized Nutrition Prescription Provided for : 1761 kcals, g protein via diet    Nutrition Interventions:   Food and/or Nutrient Delivery Interventions  Interventions: Meals and snacks  Meals and Snacks: Mineral-modified diet, Fat-modified diet  Goal: provide diet as ordered    Education Documentation  Nutrition Care Manual, taught by Aracely Rae RDN, LD at 10/18/2024  1:30 PM.  Learner: Patient  Readiness: Acceptance  Method: Explanation, Handout  Response: Verbalizes Understanding  Comment: Discussed heart-healthy diet-- limiting saturated fats and replacing with unsaturated fats, increasing fiber intake, decreasing sodium intake.             Nutrition Monitoring and Evaluation   Monitoring/Evaluation:   Food/Nutrient Related History Monitoring  Monitoring and Evaluation Plan: Energy intake  Energy Intake: Estimated energy intake  Criteria: pt to consume >/= 75% estimated needs  Additional Plans: pt will plan meals within prescribed guidelines         Time Spent/Follow-up:   Follow Up  Time Spent (min): 30 minutes  Last Date of Nutrition Visit: 10/18/24  Nutrition Follow-Up Needed?: 7-10 days  Follow up Comment: 10/25/24

## 2024-10-18 NOTE — PROGRESS NOTES
10/18/24 1435   Discharge Planning   Expected Discharge Disposition SNF     TCC received choices from patient   [facility] mentor   Referrals sent at this time   Precert needed prior to discharge     ** do not discharge without speaking to care coordination**

## 2024-10-18 NOTE — PROGRESS NOTES
Kay Pike is a 59 y.o. female on day 8 of admission presenting with Systolic heart failure secondary to coronary artery disease.      Subjective   Has been in the ICU since October 10 with acute respiratory failure attributed to CHF and COPD.  She has had an NSTEMI.  Has had a cardiac cath, no intervention.  Cardiology has been following.  She also had issues with A-fib.  She is on an appropriate cardiac regimen currently.  She has been diuresed.  Her EF is 40% .  She is also been on steroids and antibiotics.  Transferred from ICU on 10/15/24  10/17/24:  Patient did not use BiPAP last night, she did not think she needed it.  She was on high flow.  Blood gas done at 4 AM demonstrates pCO2 70.  Patient had a run of V. tach this morning, 17 beats   Patient denies shortness of breath or chest pain.  10/18/24:  Feels better today.  Used BiPAP last night.  Currently on 25 L high flow plan is to wean to nasal cannula today      Objective   Alert oriented  Heart regular rate and rhythm  Lungs clear to auscultation  Abdomen soft nontender nondistended  Extremities she has very obvious lymphedema there is some pitting her legs are erythematous bilaterally from the dorsum of her feet up to her mid shin.  There is a superficial ulceration on the left shin.  No hyperthermia  Neuro cranial nerves intact good tone strength in arms and legs  Last Recorded Vitals  BP (!) 117/30 (BP Location: Left arm, Patient Position: Lying)   Pulse 70   Temp 36.6 °C (97.9 °F) (Temporal)   Resp 18   Wt 131 kg (289 lb 0.4 oz)   SpO2 91%   Intake/Output last 3 Shifts:    Intake/Output Summary (Last 24 hours) at 10/18/2024 1243  Last data filed at 10/18/2024 1219  Gross per 24 hour   Intake 480 ml   Output 4175 ml   Net -3695 ml       Admission Weight  Weight: 127 kg (279 lb) (10/10/24 1903)    Daily Weight  10/18/24 : 131 kg (289 lb 0.4 oz)    Image Results  XR chest 1 view  Narrative: Interpreted By:  Paula Lopez,   STUDY:  XR CHEST 1  VIEW 10/17/2024 9:04 am      INDICATION:  Signs/Symptoms:chf      COMPARISON:  10/15/2024      ACCESSION NUMBER(S):  [id number]      ORDERING CLINICIAN:  ASHANTI CANDELARIO      TECHNIQUE:  Single AP view chest      FINDINGS:  Cardiomediastinal silhouette is enlarged with moderate cardiomegaly.  There is mild generalized increased interstitial prominence which is  less conspicuous from the prior examination with improved  interstitial edema suggested. Hazy opacity at the right lung base  with component of layering effusion. Retrocardiac area obscured with  small layering effusion/compressive atelectasis                  Impression: 1. Improved interstitial edema with bilateral basilar effusions      Signed by: Paula Lopez 10/17/2024 11:32 AM  Dictation workstation:   [id number]      Physical Exam    Relevant Results               Assessment/Plan             Assessment & Plan  Shortness of breath  She has acute respiratory failure due to combination of COPD and CHF.  See below.  Now that she is coming out of the ICU I will consult pulmonary.  She is still requiring a lot of respiratory support  Systolic heart failure secondary to coronary artery disease  This seems to be her primary problem.  She is now being rate controlled terms of her A-fib.  She is on Lasix milligrams IV per day.  At some point we will switch her to p.o. Lasix and add spironolactone.  Will get another chest x-ray tomorrow.  Appreciate cardiology input.  COPD (chronic obstructive pulmonary disease) with acute bronchitis (Multi)  She does not use home oxygen.  I think we can cut back on the steroids.  Will lower the Solu-Medrol to 20 mg IV twice a day.  She has been getting Zosyn.  Is supposed to stop it 5 days so I will let that run out.  Hyperglycemia  Need to check hemoglobin A1c.  Does not have a history of diabetes but she has been on high-dose steroids.    She is somewhat obese and has lymphedema.  She is got superficial ulcer of her buttocks  and her shin.  Wound care as ordered.  Will ask for a specialty mattress.  Will keep the Mcnamara in for now.  It seems obvious to me that she will need some time in a rehab facility.  We can start talking about that although she is not ready for discharge.      10/17/24:      Patient still requires high flow oxygen, currently on 30 L.  Chest x-ray done this morning, reviewed: Demonstrates about the same vascular congestion and cardiomegaly.  Increase Lasix    Nonsustained V. tach.  Potassium and magnesium levels are good.  Patient is on metoprolol 25 mg twice a day.  Discussed with Dr. You: He will increase metoprolol.    Leukocytosis, stress related, more likely.  Will monitor off antibiotics.     Lymphedema, chronic.  Stasis dermatitis.  No evidence of cellulitis on exam    Paroxysmal A-fib, currently in sinus rhythm.    Will add Protonix since patient is on Plavix and Xarelto    CO2 retention.  Will encouraged to use BiPAP during nighttime    Eventual plan to discharge to rehab once stable.  Not stable for discharge yet.    10/18/24:  Patient continues to improve.  Hopefully will be able to wean off high flow oxygen and transition to nasal cannula.  Had significant negative fluid balance yesterday, decreased Lasix to 40 mg twice a day.  Leukocytosis persistent slightly worse today, continue monitoring off antibiotics.    Divya Boggs MD

## 2024-10-18 NOTE — PROGRESS NOTES
Physical Therapy    Physical Therapy Treatment    Patient Name: Kay Pike  MRN: [id number]  Department: [address]  Room: 16/16  Today's Date: 10/18/2024  Time Calculation  Start Time: 0957  Stop Time: 1023  Time Calculation (min): 26 min         Assessment/Plan   PT Assessment  End of Session Communication: Bedside nurse  Assessment Comment: Pt progressing steadily towards stated goals. Increased time for all activities this date d/t fatigue and managing SOB. Instructed pt throughout on PLB techniques with good carryover.  End of Session Patient Position: Bed, 2 rail up, Alarm off, not on at start of session  PT Plan  Inpatient/Swing Bed or Outpatient: Inpatient  PT Plan  Treatment/Interventions: Bed mobility, Transfer training, Gait training, Strengthening, Therapeutic exercise, Therapeutic activity, Balance training, Endurance training  PT Plan: Ongoing PT  PT Frequency: 6 times per week  PT Discharge Recommendations: Moderate intensity level of continued care  Equipment Recommended upon Discharge: Other (comment) (if homegoing, consider Jr FWW)  PT Recommended Transfer Status: Assist x1, Assistive device (Jr FWW)  PT - OK to Discharge: Yes      General Visit Information:   PT  Visit  PT Received On: 10/18/24  General  Prior to Session Communication: Bedside nurse  Patient Position Received: Bed, 2 rail up, Alarm off, not on at start of session  General Comment: Pt cleared for PT by nursing. Pt agreeable to PT services.    Subjective   Precautions:  Precautions  Medical Precautions: Fall precautions, Oxygen therapy device and L/min (HFNC 25L 40%)  Precautions Comment: + telemetry, + continuous pulse-ox, + randolph      Objective   Pain:  Pain Assessment  Pain Assessment: 0-10  0-10 (Numeric) Pain Score: 0 - No pain  Cognition:  Cognition  Overall Cognitive Status: Within Functional Limits     Postural Control:  Static Sitting Balance  Static Sitting-Balance Support: Bilateral upper extremity supported, Feet  supported  Static Sitting-Level of Assistance: Close supervision  Static Sitting-Comment/Number of Minutes: good seated static balance  Static Standing Balance  Static Standing-Balance Support: Bilateral upper extremity supported  Static Standing-Level of Assistance: Contact guard  Static Standing-Comment/Number of Minutes: fair + static stand balance    Treatments:  Therapeutic Exercise  Therapeutic Exercise Performed: Yes  Therapeutic Exercise Activity 1: Seated B marches x10  Therapeutic Exercise Activity 2: Seated B LAQ x10  Therapeutic Exercise Activity 3: Seated B hip abd/add x10  Therapeutic Exercise Activity 4: Seated B ankle pumps x10    Therapeutic Activity  Therapeutic Activity Performed: Yes  Therapeutic Activity 1: Static unsupported sitting for ~10 mins with good balance throughout.    Bed Mobility  Bed Mobility: Yes  Bed Mobility 1  Bed Mobility 1: Supine to sitting, Sitting to supine  Level of Assistance 1: Close supervision  Bed Mobility Comments 1: HOB elevated and use of bed rails.    Ambulation/Gait Training  Ambulation/Gait Training Performed: Yes  Ambulation/Gait Training 1  Surface 1: Level tile  Device 1: Rolling walker  Assistance 1: Contact guard  Quality of Gait 1: Decreased step length  Comments/Distance (ft) 1: 5 feet x2. EOB > BSC. Light cuing for AD mgmt in small space, safety awareness, and widening HOMERO for increased stability.  Transfers  Transfer: Yes  Transfer 1  Transfer From 1: Sit to, Stand to  Transfer to 1: Sit, Stand  Technique 1: Sit to stand, Stand to sit  Transfer Device 1: Walker  Transfer Level of Assistance 1: Contact guard  Trials/Comments 1: Cues forant scooting, foot placement, and safe hand placement.    Outcome Measures:  [facility] Basic Mobility  Turning from your back to your side while in a flat bed without using bedrails: A little  Moving from lying on your back to sitting on the side of a flat bed without using bedrails: A little  Moving to and from bed to chair  (including a wheelchair): A little  Standing up from a chair using your arms (e.g. wheelchair or bedside chair): A little  To walk in hospital room: A lot  Climbing 3-5 steps with railing: Total  Basic Mobility - Total Score: 15    Encounter Problems       Encounter Problems (Active)       PT Problem       Pt will transition supine<>sit using hospital bed with mod I.  (Progressing)       Start:  10/14/24    Expected End:  11/10/24            Pt will transfer sit<>stand with FWW & mod I.  (Progressing)       Start:  10/14/24    Expected End:  11/10/24            Pt will ambulate >/=50 ft with FWW & mod I.  (Progressing)       Start:  10/14/24    Expected End:  11/10/24            Pt will demonstrate oxygen conservation strategies (eg, pursed lip breathing technique, no verbalization, etc) during exertive functional mobility/activities with at most 1 verbal cue (distant S). (Progressing)       Start:  10/14/24    Expected End:  11/10/24

## 2024-10-18 NOTE — CONSULTS
Heart Failure Nurse Navigator    The role of the HF nurse navigator is to (1) characterize risk profiles of patients with heart failure transitioning from [id number] after hospitalization, (2) recommend interventions to improve care and reduce risks of worse post-hospitalization outcomes. Potential recommendations may touch base on patient/family education, additional consults that may bring value, and appointment scheduling.    Assessment    I met with Kay Pike at the bedside, and my impressions include: Pt admitted for CP/SOB, Systolic heart failure secondary to coronary artery disease. Consult to Cards and Pulm. Pt had an incident of SVT and was seen by Dr. You. Currently on high flow O2 and being weaned down. BNP upon admission 618. Pt will DC to SNF when medically ready.    Patients Cardiologist(s): Would like to follow Dr. You after DC. Appt to be scheduled by this HF RN.    Patients Primary Care Provider: Bryce    1. Medical Domain  What is the patient's most recent LVEF? 43%  HFrEF (LVEF <= 40%) Quadruple therapy recommended  HFmrEF (LVEF 41-49%) Quadruple therapy recommended  HFpEF (LVEF >= 50%) Minimum recommendations: SGLT2i and MRA  Is the patient on OP GDMT for their condition?   ARNI/ACEI/ARB: Yes  BB: No  MRA: Yes  SGLT2i: No  Could this patient have advanced heart failure (Stage D heart failure)?: No   If yes, the potential markers of advanced heart failure include:     REFERENCE: Potential markers of advanced HF   Inotrope (dobutamine or milrinone) used during this admission?   LVEF<=25%?   >=2 hospitalizations for ADHF in the last year?   Severe symptoms of HF (fatigue, dyspnea, confusion, edema) despite medical therapy?   Downtitration of GDMT as compared to home medications?   Discontinuation of GDMT because of hypotension or renal intolerance?   Recurrent arrhythmias (AF, VT with ICD shocks)?   Cardiac cachexia (i.e., unintentional weight loss due to HF)?   High-risk  "biomarker profile (e.g., hyponatremia [Na<135], very elevated BNP, worsening kidney function)   Escalating doses of diuretics or persistent edema despite escalation     2. Mind and Emotion  Does this patient have possible cognitive impairment?: No (The Mini-Cog score /5)  Ask the patient to memorize these 3 words: banana, sunrise, chair  Ask the patient to draw a clock with hands pointing at \"20 minutes after 8\"  Ask the patient to recall the 3 words  Score: Add number of words recalled + clock drawing (0 points for any errors, 2 points if correct)  Interpretation: A score of 0-2 suggests cognitive impairment is present, a score of 3-5 suggests cognitive impairment is absent  Does this patient have major depression?: No (PH-Q2 score /6)  Over the last 2 weeks: Little interest or pleasure in doing things? (Not at all +0, Several days +1, More than half the days +2, Nearly every day +3)  Over the last 2 weeks: Feeling down, depressed or hopeless? (Not at all +0, Several days +1, More than half the days +2, Nearly every day +3)  Score: Add points  Interpretation: A score of 3 or more suggests that major depression is likely.     3. Physical Function  Could this patient be frail?: No   Defined by presence of all of these: slowness, weakness, shrinking, inactivity, exhaustion  Is this patient at risk for falling?: No   Defined by having experienced a fall in the last 12 months.    4. Social Determinants of Health  Transportation deficits?: No   Lack of insurance?: No   Poor financial resources?: Yes   Living conditions (homelessness, unstable home)?: Yes   Poor family/social support?: No   Poor personal care?: No   Food insecurity?: No   Lack of HCPOA?: No        Recommendations  Patient has one or more psychosocial barriers, recommend: financial difficulties with home mortgage.       Heart Failure Education   - Living With Heart Failure book provided.  - CHF signs and symptoms, heart failure zones and when to call " cardiologist.   - Controlling Heart Failure at Home: medication adherence, following up with cardiologist at least once yearly, staying healthy and active, limiting sodium and fluid intake as directed by cardiologist.  - Daily Weight Education    Waleska QUIROS RN  [facility] Clinical Nurse Navigator, CHF  [phone]

## 2024-10-18 NOTE — NURSING NOTE
Assumed care of patient. Patient currently in bed resting with eyes closed. Bipap in place. No s/s pain or discomfort. Call bell and personal items within reach. Bed in low and locked postion.

## 2024-10-18 NOTE — PROGRESS NOTES
Pulmonary Progress Note    Kay Pike is a 59 y.o. female on day 7 of admission presenting with Systolic heart failure secondary to coronary artery disease.    Subjective   No acute events  Objective   Vital Signs      10/16/2024    11:21 PM 10/17/2024     3:48 AM 10/17/2024     4:23 AM 10/17/2024     7:16 AM 10/17/2024    11:00 AM 10/17/2024     3:06 PM 10/17/2024     7:50 PM   Vitals   Systolic 146 144  144 151 130 152   Diastolic 46 51  40 50 32 35   Heart Rate 76 79    82 81   Temp 36.3 °C (97.3 °F) 36.4 °C (97.5 °F)  36.2 °C (97.2 °F) 36.5 °C (97.7 °F) 36.2 °C (97.2 °F) 36.2 °C (97.2 °F)   Resp    22 20 19 19   Weight (lb)   251.32       BMI   45.96 kg/m2       BSA (m2)   2.23 m2           Oxygen Therapy  SpO2: 93 %  Medical Gas Therapy: Supplemental oxygen  Medical Gas Delivery Method: High flow nasal cannula    FiO2 (%):  [40 %] 40 %    Intake/Output previous 24 hours:    Intake/Output Summary (Last 24 hours) at 10/17/2024 2141  Last data filed at 10/17/2024 2037  Gross per 24 hour   Intake 720 ml   Output 4375 ml   Net -3655 ml       Physical Exam  Vitals reviewed.   Constitutional:       Appearance: Normal appearance.   HENT:      Head: Normocephalic and atraumatic.      Mouth/Throat:      Mouth: Mucous membranes are moist.   Eyes:      Extraocular Movements: Extraocular movements intact.      Pupils: Pupils are equal, round, and reactive to light.   Cardiovascular:      Rate and Rhythm: Normal rate and regular rhythm.   Pulmonary:      Effort: Pulmonary effort is normal.      Breath sounds: Normal breath sounds and air entry.   Abdominal:      General: Abdomen is flat. Bowel sounds are normal.      Palpations: Abdomen is soft.   Musculoskeletal:         General: Normal range of motion.      Cervical back: Normal range of motion and neck supple.   Skin:     General: Skin is warm and dry.   Neurological:      General: No focal deficit present.      Mental Status: She is alert and oriented to person,  place, and time. Mental status is at baseline.       ...  Lines and Tubes:  Peripheral IV 10/14/24 20 G Right;Anterior Forearm (Active)   Placement Date/Time: 10/14/24 1418   Size (Gauge): 20 G  Orientation: Right;Anterior  Location: Forearm  Site Prep: Chlorhexidine   Local Anesthetic: None  Technique: Ultrasound guidance  Placed by: Alison Reeves RN [facility]  Insertion attempts: 1  Pat...   Number of days: 2       Urethral Catheter (Active)   Placement Date: 10/10/24   Hand Hygiene Completed: Yes  Urine Returned: Yes   Number of days: 6         Scheduled medications  budesonide, 0.5 mg, nebulization, BID  clopidogrel, 75 mg, oral, Daily  dapagliflozin propanediol, 10 mg, oral, Daily  escitalopram, 10 mg, oral, Daily  furosemide, 60 mg, intravenous, q12h  insulin lispro, 0-15 Units, subcutaneous, Before meals & nightly  ipratropium-albuteroL, 3 mL, nebulization, q4h  levothyroxine, 100 mcg, oral, Daily  losartan, 50 mg, oral, Daily  methylPREDNISolone sodium succinate (PF), 20 mg, intravenous, q12h  metoprolol succinate XL, 50 mg, oral, BID  nystatin, 1 Application, Topical, BID  [START ON 10/18/2024] pantoprazole, 40 mg, oral, Daily before breakfast  perflutren protein A microsphere, 0.5 mL, intravenous, Once in imaging  rivaroxaban, 20 mg, oral, Daily with evening meal  simvastatin, 40 mg, oral, Nightly  sulfur hexafluoride microsphr, 2 mL, intravenous, Once in imaging      Continuous medications     PRN medications  PRN medications: acetaminophen, dextrose, dextrose, glucagon, glucagon, hydrOXYzine HCL, ipratropium-albuteroL, lidocaine-epinephrine, morphine, nitroglycerin, oxygen, oxygen, polyethylene glycol    Relevant Results  Results from last 7 days   Lab Units 10/17/24  0455 10/16/24  0439 10/15/24  0444   WBC AUTO x10*3/uL 15.1* 16.6* 15.6*   HEMOGLOBIN g/dL 14.4 14.4 14.4   HEMATOCRIT % 46.2* 46.0 46.0   PLATELETS AUTO x10*3/uL 187 187 188      Results from last 7 days   Lab Units 10/17/24  0450  10/16/24  0439 10/15/24  0444   SODIUM mmol/L 137 137 138   POTASSIUM mmol/L 4.1 4.5 4.8   CHLORIDE mmol/L 92* 93* 97*   CO2 mmol/L 41* 42* 39*   BUN mg/dL 32* 37* 38*   CREATININE mg/dL 0.63 0.83 0.74   GLUCOSE mg/dL 97 166* 147*   CALCIUM mg/dL 8.7 8.9 8.8      Results from last 7 days   Lab Units 10/17/24  0441   POCT PH, ARTERIAL pH 7.43*   POCT PCO2, ARTERIAL mm Hg 70*   POCT PO2, ARTERIAL mm Hg 65*   POCT HCO3 CALCULATED, ARTERIAL mmol/L 46.5*   POCT BASE EXCESS, ARTERIAL mmol/L 18.2*     XR chest 1 view 10/15/2024    Narrative  Interpreted By:  Geovanny Price,  STUDY:  XR CHEST 1 VIEW;  10/15/2024 10:43 am    INDICATION:  Signs/Symptoms:increased O2 demands.      COMPARISON:  10/12/2025    ACCESSION NUMBER(S):  [id number]    ORDERING CLINICIAN:  MAXIMO ZAPATA    FINDINGS:          CARDIOMEDIASTINAL SILHOUETTE:  Cardiomediastinal silhouette is moderately enlarged. There is  pulmonary vascular congestion with mild edema    LUNGS:  No dense consolidation seen. No large effusion    ABDOMEN:  No remarkable upper abdominal findings.    BONES:  No acute osseous changes.    Impression  1. Moderate enlargement of the cardiac silhouette with pulmonary edema        MACRO:  None    Signed by: Geovanny Price 10/15/2024 11:14 AM  Dictation workstation:   [id number]      Patient Active Problem List   Diagnosis    Shortness of breath    Systolic heart failure secondary to coronary artery disease    COPD (chronic obstructive pulmonary disease) with acute bronchitis (Multi)    Hyperglycemia     Assessment/Plan     Acute hypoxic resp failure 2/2 flash pulmonary edema  COPD  Asthma  R/O pneumonia  STEMI - no occlusions on heart cath  CHF exacerbation  Afib RVR  HTN  HLD    - Wean HFNC  - Maintain SPO2 >92%  - Duonebs q4h  - Cont Bipap at night   - Solumedrol 40 q12h  - Empiric ATB  - Continuous cardiac monitoring per ICU protocol  - Maintain MAPS >65  - Daily EKGs prm    - Heart cath 10/10: lateral ST elevation  myocardial infarction secondary to thrombus seen on cardiac catheterization distal diagonal. No indication for intervention since the lesion is very distally and there is ZAK I flow seen in the artery which is small overall for intervention. The remainder of her coronaries appears without obstructive coronary disease. Recommend aspirin Brilinta. High intensity statin.     - Echo: EF 40-45%    -Continue lasix  -Continue xarelto  - Wean HFNC to nasal canula  - dc to SNF soon      Uriah Gutierrez MD

## 2024-10-18 NOTE — PROGRESS NOTES
"Kay Pike is a 59 y.o. female on day 8 of admission presenting with Systolic heart failure secondary to coronary artery disease.    Subjective   Resting comfortably.  Remains on high flow.  Sinus rhythm without significant arrhythmia overnight.  Diuresing.       Objective     Physical Exam   Gen: NAD   Neck: no JVD, carotid upstroke is brisk and without delay   Heart: rrr, s1s2+ no mrg   Lungs: Diminished basilar breath sounds   Ext: warm 2+ pitting edema on nonpitting edema    Last Recorded Vitals  Blood pressure (!) 147/41, pulse 73, temperature 36.1 °C (97 °F), temperature source Temporal, resp. rate 21, height 1.575 m (5' 2.01\"), weight 131 kg (289 lb 0.4 oz), SpO2 90%.  Intake/Output last 3 Shifts:  I/O last 3 completed shifts:  In: 1060 (8.1 mL/kg) [P.O.:1060]  Out: 6175 (47.1 mL/kg) [Urine:6175 (1.3 mL/kg/hr)]  Weight: 131.1 kg         Assessment/Plan   Assessment & Plan  Shortness of breath    Systolic heart failure secondary to coronary artery disease    COPD (chronic obstructive pulmonary disease) with acute bronchitis (Multi)    Hyperglycemia    10/14: Be consulted for rapid heart rates initially suspected to be SVT.  Review of telemetry data as well as review of EKG in my opinion reveals a rapid atrial fibrillation with heart rates in the 160s.  Patient does not have a history of atrial fibrillation.  Most recent echocardiogram shows reduced ejection fraction of 40 to 45% with no significant atrial dilatation or abnormal valvular function.  Patient is currently experiencing an exacerbation of COPD.  She did undergo cardiac catheterization which revealed:  thrombus seen on cardiac catheterization distal diagonal 1.  No indication for intervention since the lesion is very distally and the artery diameter is small overall for intervention.  Since cardiac catheterization the intensivist team focused on fluid volume management as she appeared to be fluid overloaded on admission as well exacerbation of " COPD.  On assessment today her lung sounds are rhonchorous throughout.  She has 2-3 word conversational dyspnea.  She remains with lymphedema to bilateral lower extremities.  At this point would continue with oral metoprolol, however would increase this to 25 mg p.o. twice daily.  Noting reduced ejection fraction of 40% would like to try to avoid diltiazem at this time.  Will utilize digoxin 500 mcg x 1 followed by 250 mcg x 1 4 hours later.  Would plan to start the patient on 125 mcg of digoxin tomorrow morning.  Concerning anticoagulation patient's [id number] equals 4.  Anticoagulation is generally recommended.  At this point she has been placed on aspirin as well as ticagrelor after her recent cardiac catheterization with no stent placement.  Generally triple therapy is not recommended long term.  For today we will continue with her oral aspirin and Brilinta.  Will reevaluate tomorrow to assess burden of atrial fibrillation and at that point if she remains in atrial fibrillation we will likely stop aspirin and continue with ticagrelor and apixaban.  Time my assessment she does have rhonchorous breath sounds throughout.  She continues to receive IV furosemide which I agree with.  Will follow with you.      10/15: Improved over the past 24 hours.  Patient was given IV digoxin yesterday for  first diagnosed rapid atrial fibrillation.  This was very effective and the patient spontaneously converted back to a sinus rhythm at approximately 6 PM yesterday and has remained in a sinus rhythm since that time.  Additionally her beta-blocker was increased.  At this point we will trial discontinuation of digoxin and attempt to continue with beta-blocker alone.  Concerning anticoagulation as noted above [id number] equals 4.  Anticoagulation in this patient is recommended.  She is currently on aspirin as well as ticagrelor.  Will stop oral aspirin and start the patient on rivaroxaban for stroke risk reduction.  Lung sounds  have significantly improved with BiPAP overnight.  Overall improving.  Would like to follow 1 day further.  Will follow with you.     8/16: Remains in sinus rhythm.  Continue metoprolol.  Continue oral anticoagulation for stroke risk reduction.  Recent NSTEMI from first diagonal thromboembolism.  That being said coronary angiography does show some element of atherosclerotic disease.  I am switching her antiplatelet from Brilinta to Plavix.  Continue statin.  Reasonable to transfer to stepdown today.  Will follow.     8/17: 16 beat run of NSVT this morning asymptomatic. Will increase metoprolol. Continue GDMT. Getting IV lasix this AM, reasonable. Will follow    8/18: Would continue diuresis with IV Lasix, strict attention to intakes and outputs and daily weights.  Will continue to follow this patient with you.             Sha You, DO

## 2024-10-19 LAB
ALBUMIN SERPL BCP-MCNC: 3.4 G/DL (ref 3.4–5)
ALP SERPL-CCNC: 66 U/L (ref 33–110)
ALT SERPL W P-5'-P-CCNC: 27 U/L (ref 7–45)
ANION GAP SERPL CALCULATED.3IONS-SCNC: 9 MMOL/L (ref 10–20)
APPEARANCE UR: CLEAR
AST SERPL W P-5'-P-CCNC: 13 U/L (ref 9–39)
BASOPHILS # BLD AUTO: 0.01 X10*3/UL (ref 0–0.1)
BASOPHILS NFR BLD AUTO: 0.1 %
BILIRUB SERPL-MCNC: 0.9 MG/DL (ref 0–1.2)
BILIRUB UR STRIP.AUTO-MCNC: NEGATIVE MG/DL
BUN SERPL-MCNC: 36 MG/DL (ref 6–23)
CALCIUM SERPL-MCNC: 9.2 MG/DL (ref 8.6–10.3)
CHLORIDE SERPL-SCNC: 91 MMOL/L (ref 98–107)
CO2 SERPL-SCNC: 39 MMOL/L (ref 21–32)
COLOR UR: ABNORMAL
CREAT SERPL-MCNC: 0.72 MG/DL (ref 0.5–1.05)
EGFRCR SERPLBLD CKD-EPI 2021: >90 ML/MIN/1.73M*2
EOSINOPHIL # BLD AUTO: 0.02 X10*3/UL (ref 0–0.7)
EOSINOPHIL NFR BLD AUTO: 0.1 %
ERYTHROCYTE [DISTWIDTH] IN BLOOD BY AUTOMATED COUNT: 17.9 % (ref 11.5–14.5)
GLUCOSE BLD MANUAL STRIP-MCNC: 161 MG/DL (ref 74–99)
GLUCOSE BLD MANUAL STRIP-MCNC: 163 MG/DL (ref 74–99)
GLUCOSE BLD MANUAL STRIP-MCNC: 191 MG/DL (ref 74–99)
GLUCOSE BLD MANUAL STRIP-MCNC: 203 MG/DL (ref 74–99)
GLUCOSE SERPL-MCNC: 180 MG/DL (ref 74–99)
GLUCOSE UR STRIP.AUTO-MCNC: ABNORMAL MG/DL
HCT VFR BLD AUTO: 47.8 % (ref 36–46)
HGB BLD-MCNC: 15.7 G/DL (ref 12–16)
IMM GRANULOCYTES # BLD AUTO: 0.14 X10*3/UL (ref 0–0.7)
IMM GRANULOCYTES NFR BLD AUTO: 0.9 % (ref 0–0.9)
KETONES UR STRIP.AUTO-MCNC: NEGATIVE MG/DL
LEUKOCYTE ESTERASE UR QL STRIP.AUTO: NEGATIVE
LYMPHOCYTES # BLD AUTO: 0.95 X10*3/UL (ref 1.2–4.8)
LYMPHOCYTES NFR BLD AUTO: 5.9 %
MAGNESIUM SERPL-MCNC: 2.46 MG/DL (ref 1.6–2.4)
MCH RBC QN AUTO: 27 PG (ref 26–34)
MCHC RBC AUTO-ENTMCNC: 32.8 G/DL (ref 32–36)
MCV RBC AUTO: 82 FL (ref 80–100)
MONOCYTES # BLD AUTO: 0.53 X10*3/UL (ref 0.1–1)
MONOCYTES NFR BLD AUTO: 3.3 %
NEUTROPHILS # BLD AUTO: 14.33 X10*3/UL (ref 1.2–7.7)
NEUTROPHILS NFR BLD AUTO: 89.7 %
NITRITE UR QL STRIP.AUTO: NEGATIVE
NRBC BLD-RTO: 0 /100 WBCS (ref 0–0)
PH UR STRIP.AUTO: 6.5 [PH]
PHOSPHATE SERPL-MCNC: 3.7 MG/DL (ref 2.5–4.9)
PLATELET # BLD AUTO: 212 X10*3/UL (ref 150–450)
POTASSIUM SERPL-SCNC: 4.6 MMOL/L (ref 3.5–5.3)
PROT SERPL-MCNC: 6.8 G/DL (ref 6.4–8.2)
PROT UR STRIP.AUTO-MCNC: NEGATIVE MG/DL
RBC # BLD AUTO: 5.82 X10*6/UL (ref 4–5.2)
RBC # UR STRIP.AUTO: ABNORMAL /UL
RBC #/AREA URNS AUTO: ABNORMAL /HPF
SODIUM SERPL-SCNC: 134 MMOL/L (ref 136–145)
SP GR UR STRIP.AUTO: 1.01
UROBILINOGEN UR STRIP.AUTO-MCNC: NORMAL MG/DL
WBC # BLD AUTO: 16 X10*3/UL (ref 4.4–11.3)
WBC #/AREA URNS AUTO: ABNORMAL /HPF
YEAST HYPHAE #/AREA UR COMP ASSIST: PRESENT /HPF

## 2024-10-19 PROCEDURE — 2500000002 HC RX 250 W HCPCS SELF ADMINISTERED DRUGS (ALT 637 FOR MEDICARE OP, ALT 636 FOR OP/ED): Performed by: INTERNAL MEDICINE

## 2024-10-19 PROCEDURE — 2500000004 HC RX 250 GENERAL PHARMACY W/ HCPCS (ALT 636 FOR OP/ED): Performed by: INTERNAL MEDICINE

## 2024-10-19 PROCEDURE — 94640 AIRWAY INHALATION TREATMENT: CPT

## 2024-10-19 PROCEDURE — 99232 SBSQ HOSP IP/OBS MODERATE 35: CPT | Performed by: NURSE PRACTITIONER

## 2024-10-19 PROCEDURE — 2500000001 HC RX 250 WO HCPCS SELF ADMINISTERED DRUGS (ALT 637 FOR MEDICARE OP): Performed by: INTERNAL MEDICINE

## 2024-10-19 PROCEDURE — 5A09357 ASSISTANCE WITH RESPIRATORY VENTILATION, LESS THAN 24 CONSECUTIVE HOURS, CONTINUOUS POSITIVE AIRWAY PRESSURE: ICD-10-PCS | Performed by: INTERNAL MEDICINE

## 2024-10-19 PROCEDURE — 36415 COLL VENOUS BLD VENIPUNCTURE: CPT | Performed by: INTERNAL MEDICINE

## 2024-10-19 PROCEDURE — 82947 ASSAY GLUCOSE BLOOD QUANT: CPT

## 2024-10-19 PROCEDURE — 85025 COMPLETE CBC W/AUTO DIFF WBC: CPT | Performed by: INTERNAL MEDICINE

## 2024-10-19 PROCEDURE — 84100 ASSAY OF PHOSPHORUS: CPT | Performed by: INTERNAL MEDICINE

## 2024-10-19 PROCEDURE — 9420000001 HC RT PATIENT EDUCATION 5 MIN

## 2024-10-19 PROCEDURE — 83735 ASSAY OF MAGNESIUM: CPT | Performed by: INTERNAL MEDICINE

## 2024-10-19 PROCEDURE — 2060000001 HC INTERMEDIATE ICU ROOM DAILY

## 2024-10-19 PROCEDURE — 81001 URINALYSIS AUTO W/SCOPE: CPT | Performed by: INTERNAL MEDICINE

## 2024-10-19 PROCEDURE — 99233 SBSQ HOSP IP/OBS HIGH 50: CPT | Performed by: INTERNAL MEDICINE

## 2024-10-19 PROCEDURE — 80053 COMPREHEN METABOLIC PANEL: CPT | Performed by: INTERNAL MEDICINE

## 2024-10-19 PROCEDURE — 2500000005 HC RX 250 GENERAL PHARMACY W/O HCPCS: Performed by: INTERNAL MEDICINE

## 2024-10-19 RX ORDER — AMOXICILLIN AND CLAVULANATE POTASSIUM 875; 125 MG/1; MG/1
1 TABLET, FILM COATED ORAL EVERY 12 HOURS SCHEDULED
Status: DISCONTINUED | OUTPATIENT
Start: 2024-10-19 | End: 2024-10-24 | Stop reason: HOSPADM

## 2024-10-19 RX ADMIN — METHYLPREDNISOLONE SODIUM SUCCINATE 20 MG: 40 INJECTION, POWDER, FOR SOLUTION INTRAMUSCULAR; INTRAVENOUS at 23:51

## 2024-10-19 RX ADMIN — NYSTATIN 1 APPLICATION: 100000 POWDER TOPICAL at 08:59

## 2024-10-19 RX ADMIN — BUDESONIDE INHALATION 0.5 MG: 0.5 SUSPENSION RESPIRATORY (INHALATION) at 19:17

## 2024-10-19 RX ADMIN — Medication 6 L/MIN: at 19:17

## 2024-10-19 RX ADMIN — IPRATROPIUM BROMIDE AND ALBUTEROL SULFATE 3 ML: 2.5; .5 SOLUTION RESPIRATORY (INHALATION) at 04:39

## 2024-10-19 RX ADMIN — CLOPIDOGREL BISULFATE 75 MG: 75 TABLET ORAL at 08:59

## 2024-10-19 RX ADMIN — ESCITALOPRAM OXALATE 10 MG: 10 TABLET ORAL at 08:59

## 2024-10-19 RX ADMIN — RIVAROXABAN 20 MG: 20 TABLET, FILM COATED ORAL at 16:22

## 2024-10-19 RX ADMIN — INSULIN LISPRO 3 UNITS: 100 INJECTION, SOLUTION INTRAVENOUS; SUBCUTANEOUS at 21:30

## 2024-10-19 RX ADMIN — LOSARTAN POTASSIUM 50 MG: 50 TABLET, FILM COATED ORAL at 08:59

## 2024-10-19 RX ADMIN — NYSTATIN 1 APPLICATION: 100000 POWDER TOPICAL at 21:30

## 2024-10-19 RX ADMIN — IPRATROPIUM BROMIDE AND ALBUTEROL SULFATE 3 ML: 2.5; .5 SOLUTION RESPIRATORY (INHALATION) at 19:17

## 2024-10-19 RX ADMIN — INSULIN LISPRO 3 UNITS: 100 INJECTION, SOLUTION INTRAVENOUS; SUBCUTANEOUS at 16:22

## 2024-10-19 RX ADMIN — AMOXICILLIN AND CLAVULANATE POTASSIUM 1 TABLET: 875; 125 TABLET, FILM COATED ORAL at 11:19

## 2024-10-19 RX ADMIN — SIMVASTATIN 40 MG: 40 TABLET, FILM COATED ORAL at 21:47

## 2024-10-19 RX ADMIN — AMOXICILLIN AND CLAVULANATE POTASSIUM 1 TABLET: 875; 125 TABLET, FILM COATED ORAL at 21:47

## 2024-10-19 RX ADMIN — BUDESONIDE INHALATION 0.5 MG: 0.5 SUSPENSION RESPIRATORY (INHALATION) at 08:18

## 2024-10-19 RX ADMIN — LEVOTHYROXINE SODIUM 100 MCG: 0.1 TABLET ORAL at 06:40

## 2024-10-19 RX ADMIN — METHYLPREDNISOLONE SODIUM SUCCINATE 20 MG: 40 INJECTION, POWDER, FOR SOLUTION INTRAMUSCULAR; INTRAVENOUS at 11:33

## 2024-10-19 RX ADMIN — IPRATROPIUM BROMIDE AND ALBUTEROL SULFATE 3 ML: 2.5; .5 SOLUTION RESPIRATORY (INHALATION) at 08:18

## 2024-10-19 RX ADMIN — FUROSEMIDE 40 MG: 10 INJECTION, SOLUTION INTRAMUSCULAR; INTRAVENOUS at 11:32

## 2024-10-19 RX ADMIN — INSULIN LISPRO 3 UNITS: 100 INJECTION, SOLUTION INTRAVENOUS; SUBCUTANEOUS at 09:08

## 2024-10-19 RX ADMIN — METOPROLOL SUCCINATE 50 MG: 50 TABLET, EXTENDED RELEASE ORAL at 08:59

## 2024-10-19 RX ADMIN — FUROSEMIDE 40 MG: 10 INJECTION, SOLUTION INTRAMUSCULAR; INTRAVENOUS at 23:51

## 2024-10-19 RX ADMIN — DAPAGLIFLOZIN 10 MG: 10 TABLET, FILM COATED ORAL at 08:59

## 2024-10-19 RX ADMIN — INSULIN LISPRO 6 UNITS: 100 INJECTION, SOLUTION INTRAVENOUS; SUBCUTANEOUS at 11:54

## 2024-10-19 ASSESSMENT — COGNITIVE AND FUNCTIONAL STATUS - GENERAL
CLIMB 3 TO 5 STEPS WITH RAILING: A LITTLE
EATING MEALS: A LITTLE
MOBILITY SCORE: 18
STANDING UP FROM CHAIR USING ARMS: A LITTLE
DAILY ACTIVITIY SCORE: 18
DAILY ACTIVITIY SCORE: 18
MOVING TO AND FROM BED TO CHAIR: A LITTLE
DRESSING REGULAR UPPER BODY CLOTHING: A LITTLE
TOILETING: A LITTLE
WALKING IN HOSPITAL ROOM: A LITTLE
DRESSING REGULAR UPPER BODY CLOTHING: A LITTLE
TURNING FROM BACK TO SIDE WHILE IN FLAT BAD: A LITTLE
MOVING TO AND FROM BED TO CHAIR: A LITTLE
CLIMB 3 TO 5 STEPS WITH RAILING: A LITTLE
STANDING UP FROM CHAIR USING ARMS: A LITTLE
DRESSING REGULAR LOWER BODY CLOTHING: A LITTLE
EATING MEALS: A LITTLE
DRESSING REGULAR LOWER BODY CLOTHING: A LITTLE
TOILETING: A LITTLE
PERSONAL GROOMING: A LITTLE
MOBILITY SCORE: 18
PERSONAL GROOMING: A LITTLE
HELP NEEDED FOR BATHING: A LITTLE
WALKING IN HOSPITAL ROOM: A LITTLE
HELP NEEDED FOR BATHING: A LITTLE
MOVING FROM LYING ON BACK TO SITTING ON SIDE OF FLAT BED WITH BEDRAILS: A LITTLE
TURNING FROM BACK TO SIDE WHILE IN FLAT BAD: A LITTLE
MOVING FROM LYING ON BACK TO SITTING ON SIDE OF FLAT BED WITH BEDRAILS: A LITTLE

## 2024-10-19 ASSESSMENT — PAIN SCALES - WONG BAKER: WONGBAKER_NUMERICALRESPONSE: NO HURT

## 2024-10-19 ASSESSMENT — PAIN - FUNCTIONAL ASSESSMENT: PAIN_FUNCTIONAL_ASSESSMENT: 0-10

## 2024-10-19 ASSESSMENT — PAIN SCALES - GENERAL
PAINLEVEL_OUTOF10: 0 - NO PAIN
PAINLEVEL_OUTOF10: 0 - NO PAIN

## 2024-10-19 NOTE — PROGRESS NOTES
"Kay Pike is a 59 y.o. female on day 9 of admission presenting with Systolic heart failure secondary to coronary artery disease.    Subjective   Patient seen. Sitting up in chair. She reports she is feeling good. Denies any shortness of breath, chest pain, palpitations. Reports her legs feel much better than on admission.        Objective     Physical Exam  Constitutional:       General: She is not in acute distress.     Appearance: She is obese.   Cardiovascular:      Rate and Rhythm: Normal rate and regular rhythm.      Pulses: Normal pulses.      Heart sounds: Normal heart sounds.   Pulmonary:      Effort: Pulmonary effort is normal.      Comments: Diminished  Abdominal:      General: Bowel sounds are normal. There is no distension.      Palpations: Abdomen is soft.   Musculoskeletal:         General: Normal range of motion.      Cervical back: Normal range of motion and neck supple.      Right lower leg: Edema present.      Left lower leg: Edema present.      Comments: Bilateral lymphedema   Skin:     General: Skin is warm.      Capillary Refill: Capillary refill takes less than 2 seconds.   Neurological:      General: No focal deficit present.      Mental Status: She is alert and oriented to person, place, and time. Mental status is at baseline.   Psychiatric:         Mood and Affect: Mood normal.         Behavior: Behavior normal.         Last Recorded Vitals  Blood pressure (!) 132/41, pulse 66, temperature 36.3 °C (97.3 °F), temperature source Temporal, resp. rate 18, height 1.575 m (5' 2.01\"), weight 131 kg (289 lb 7.4 oz), SpO2 94%.  Intake/Output last 3 Shifts:  I/O last 3 completed shifts:  In: 120 (0.9 mL/kg) [P.O.:120]  Out: 5125 (39 mL/kg) [Urine:5125 (1.1 mL/kg/hr)]  Weight: 131.3 kg     Relevant Results         Scheduled medications  amoxicillin-pot clavulanate, 1 tablet, oral, q12h PORTIA  budesonide, 0.5 mg, nebulization, BID  clopidogrel, 75 mg, oral, Daily  dapagliflozin propanediol, 10 mg, " oral, Daily  escitalopram, 10 mg, oral, Daily  furosemide, 40 mg, intravenous, q12h  insulin lispro, 0-15 Units, subcutaneous, Before meals & nightly  ipratropium-albuteroL, 3 mL, nebulization, q4h  levothyroxine, 100 mcg, oral, Daily  losartan, 50 mg, oral, Daily  methylPREDNISolone sodium succinate (PF), 20 mg, intravenous, q12h  metoprolol succinate XL, 50 mg, oral, BID  nystatin, 1 Application, Topical, BID  pantoprazole, 40 mg, oral, Daily before breakfast  perflutren protein A microsphere, 0.5 mL, intravenous, Once in imaging  rivaroxaban, 20 mg, oral, Daily with evening meal  simvastatin, 40 mg, oral, Nightly  sulfur hexafluoride microsphr, 2 mL, intravenous, Once in imaging      Continuous medications     PRN medications  PRN medications: acetaminophen, dextrose, dextrose, glucagon, glucagon, hydrOXYzine HCL, ipratropium-albuteroL, lidocaine-epinephrine, morphine, nitroglycerin, oxygen, oxygen, polyethylene glycol     Results for orders placed or performed during the hospital encounter of 10/10/24 (from the past 24 hours)   POCT GLUCOSE   Result Value Ref Range    POCT Glucose 202 (H) 74 - 99 mg/dL   POCT GLUCOSE   Result Value Ref Range    POCT Glucose 105 (H) 74 - 99 mg/dL   CBC and Auto Differential   Result Value Ref Range    WBC 16.0 (H) 4.4 - 11.3 x10*3/uL    nRBC 0.0 0.0 - 0.0 /100 WBCs    RBC 5.82 (H) 4.00 - 5.20 x10*6/uL    Hemoglobin 15.7 12.0 - 16.0 g/dL    Hematocrit 47.8 (H) 36.0 - 46.0 %    MCV 82 80 - 100 fL    MCH 27.0 26.0 - 34.0 pg    MCHC 32.8 32.0 - 36.0 g/dL    RDW 17.9 (H) 11.5 - 14.5 %    Platelets 212 150 - 450 x10*3/uL    Neutrophils % 89.7 40.0 - 80.0 %    Immature Granulocytes %, Automated 0.9 0.0 - 0.9 %    Lymphocytes % 5.9 13.0 - 44.0 %    Monocytes % 3.3 2.0 - 10.0 %    Eosinophils % 0.1 0.0 - 6.0 %    Basophils % 0.1 0.0 - 2.0 %    Neutrophils Absolute 14.33 (H) 1.20 - 7.70 x10*3/uL    Immature Granulocytes Absolute, Automated 0.14 0.00 - 0.70 x10*3/uL    Lymphocytes Absolute  0.95 (L) 1.20 - 4.80 x10*3/uL    Monocytes Absolute 0.53 0.10 - 1.00 x10*3/uL    Eosinophils Absolute 0.02 0.00 - 0.70 x10*3/uL    Basophils Absolute 0.01 0.00 - 0.10 x10*3/uL   Comprehensive Metabolic Panel   Result Value Ref Range    Glucose 180 (H) 74 - 99 mg/dL    Sodium 134 (L) 136 - 145 mmol/L    Potassium 4.6 3.5 - 5.3 mmol/L    Chloride 91 (L) 98 - 107 mmol/L    Bicarbonate 39 (H) 21 - 32 mmol/L    Anion Gap 9 (L) 10 - 20 mmol/L    Urea Nitrogen 36 (H) 6 - 23 mg/dL    Creatinine 0.72 0.50 - 1.05 mg/dL    eGFR >90 >60 mL/min/1.73m*2    Calcium 9.2 8.6 - 10.3 mg/dL    Albumin 3.4 3.4 - 5.0 g/dL    Alkaline Phosphatase 66 33 - 110 U/L    Total Protein 6.8 6.4 - 8.2 g/dL    AST 13 9 - 39 U/L    Bilirubin, Total 0.9 0.0 - 1.2 mg/dL    ALT 27 7 - 45 U/L   Magnesium   Result Value Ref Range    Magnesium 2.46 (H) 1.60 - 2.40 mg/dL   Phosphorus   Result Value Ref Range    Phosphorus 3.7 2.5 - 4.9 mg/dL   POCT GLUCOSE   Result Value Ref Range    POCT Glucose 161 (H) 74 - 99 mg/dL   POCT GLUCOSE   Result Value Ref Range    POCT Glucose 203 (H) 74 - 99 mg/dL              Assessment/Plan   Assessment & Plan  Shortness of breath    Systolic heart failure secondary to coronary artery disease    COPD (chronic obstructive pulmonary disease) with acute bronchitis (Multi)    Hyperglycemia    10/14: Be consulted for rapid heart rates initially suspected to be SVT.  Review of telemetry data as well as review of EKG in my opinion reveals a rapid atrial fibrillation with heart rates in the 160s.  Patient does not have a history of atrial fibrillation.  Most recent echocardiogram shows reduced ejection fraction of 40 to 45% with no significant atrial dilatation or abnormal valvular function.  Patient is currently experiencing an exacerbation of COPD.  She did undergo cardiac catheterization which revealed:  thrombus seen on cardiac catheterization distal diagonal 1.  No indication for intervention since the lesion is very distally  and the artery diameter is small overall for intervention.  Since cardiac catheterization the intensivist team focused on fluid volume management as she appeared to be fluid overloaded on admission as well exacerbation of COPD.  On assessment today her lung sounds are rhonchorous throughout.  She has 2-3 word conversational dyspnea.  She remains with lymphedema to bilateral lower extremities.  At this point would continue with oral metoprolol, however would increase this to 25 mg p.o. twice daily.  Noting reduced ejection fraction of 40% would like to try to avoid diltiazem at this time.  Will utilize digoxin 500 mcg x 1 followed by 250 mcg x 1 4 hours later.  Would plan to start the patient on 125 mcg of digoxin tomorrow morning.  Concerning anticoagulation patient's [id number] equals 4.  Anticoagulation is generally recommended.  At this point she has been placed on aspirin as well as ticagrelor after her recent cardiac catheterization with no stent placement.  Generally triple therapy is not recommended long term.  For today we will continue with her oral aspirin and Brilinta.  Will reevaluate tomorrow to assess burden of atrial fibrillation and at that point if she remains in atrial fibrillation we will likely stop aspirin and continue with ticagrelor and apixaban.  Time my assessment she does have rhonchorous breath sounds throughout.  She continues to receive IV furosemide which I agree with.  Will follow with you.      10/15: Improved over the past 24 hours.  Patient was given IV digoxin yesterday for  first diagnosed rapid atrial fibrillation.  This was very effective and the patient spontaneously converted back to a sinus rhythm at approximately 6 PM yesterday and has remained in a sinus rhythm since that time.  Additionally her beta-blocker was increased.  At this point we will trial discontinuation of digoxin and attempt to continue with beta-blocker alone.  Concerning anticoagulation as noted above  [id number] equals 4.  Anticoagulation in this patient is recommended.  She is currently on aspirin as well as ticagrelor.  Will stop oral aspirin and start the patient on rivaroxaban for stroke risk reduction.  Lung sounds have significantly improved with BiPAP overnight.  Overall improving.  Would like to follow 1 day further.  Will follow with you.     8/16: Remains in sinus rhythm.  Continue metoprolol.  Continue oral anticoagulation for stroke risk reduction.  Recent NSTEMI from first diagonal thromboembolism.  That being said coronary angiography does show some element of atherosclerotic disease.  I am switching her antiplatelet from Brilinta to Plavix.  Continue statin.  Reasonable to transfer to stepdown today.  Will follow.     8/17: 16 beat run of NSVT this morning asymptomatic. Will increase metoprolol. Continue GDMT. Getting IV lasix this AM, reasonable. Will follow     8/18: Would continue diuresis with IV Lasix, strict attention to intakes and outputs and daily weights.  Will continue to follow this patient with you.    10/19: Patient feeling good and denies any chest pain, SOB, palpitations. BP normotensive. SpO2 94% on 6L NC. Transitioned from HiFlow to NC yesterday. Labs today with sodium 134, potassium 4.6, creatinine 0.72, magnesium 2.46, WBC 16.0, hemoglobin 15.7. -200 mL fluid balance today so far. -3L total yesterday. Continue to diuresis; ancipitate transitioning to oral tomorrow. On Xarelto for stroke risk reduction. Remains in sinus rhythm on telemetry. Continue Plavix, Farxiga, metoprolol succ, statin, losartan. We will continue to follow.        I spent 30 minutes in the professional and overall care of this patient.      Kirti Morales, APRN-CNP

## 2024-10-19 NOTE — PROGRESS NOTES
Kay Pike is a 59 y.o. female on day 9 of admission presenting with Systolic heart failure secondary to coronary artery disease.      Subjective   Has been in the ICU since October 10 with acute respiratory failure attributed to CHF and COPD.  She has had an NSTEMI.  Has had a cardiac cath, no intervention.  Cardiology has been following.  She also had issues with A-fib.  She is on an appropriate cardiac regimen currently.  She has been diuresed.  Her EF is 40% .  She is also been on steroids and antibiotics.  Transferred from ICU on 10/15/24  10/17/24:  Patient did not use BiPAP last night, she did not think she needed it.  She was on high flow.  Blood gas done at 4 AM demonstrates pCO2 70.  Patient had a run of V. tach this morning, 17 beats   Patient denies shortness of breath or chest pain.  10/18/24:  Feels better today.  Used BiPAP last night.  Currently on 25 L high flow plan is to wean to nasal cannula today  10/19/24:  Feels better.  More active.  Currently on 6 L nasal cannula.  -3 L yesterday  When asked, admits to some cough or wheeze some thick sputum production    Objective   Alert oriented.  Looks better, more active  In no apparent distress at rest on 6 L of oxygen per nasal cannula  Heart regular rate and rhythm  Lungs clear to auscultation  Abdomen soft nontender nondistended  Extremities: Bilateral lymphedema, improved significantly. there is a superficial ulceration on the left shin.  No hyperthermia  Neuro cranial nerves intact good tone strength in arms and legs  Last Recorded Vitals  /53 (BP Location: Right arm, Patient Position: Lying)   Pulse 66   Temp 36.2 °C (97.2 °F) (Temporal)   Resp (!) 28   Wt 131 kg (289 lb 7.4 oz)   SpO2 97%   Intake/Output last 3 Shifts:    Intake/Output Summary (Last 24 hours) at 10/19/2024 0952  Last data filed at 10/19/2024 0424  Gross per 24 hour   Intake --   Output 3025 ml   Net -3025 ml       Admission Weight  Weight: 127 kg (279 lb) (10/10/24  1903)    Daily Weight  10/19/24 : 131 kg (289 lb 7.4 oz)    Image Results  XR chest 1 view  Narrative: Interpreted By:  Paula Lopez,   STUDY:  XR CHEST 1 VIEW 10/17/2024 9:04 am      INDICATION:  Signs/Symptoms:chf      COMPARISON:  10/15/2024      ACCESSION NUMBER(S):  [id number]      ORDERING CLINICIAN:  ASHANTI CANDELARIO      TECHNIQUE:  Single AP view chest      FINDINGS:  Cardiomediastinal silhouette is enlarged with moderate cardiomegaly.  There is mild generalized increased interstitial prominence which is  less conspicuous from the prior examination with improved  interstitial edema suggested. Hazy opacity at the right lung base  with component of layering effusion. Retrocardiac area obscured with  small layering effusion/compressive atelectasis                  Impression: 1. Improved interstitial edema with bilateral basilar effusions      Signed by: Paula Lopez 10/17/2024 11:32 AM  Dictation workstation:   [id number]      Physical Exam    Relevant Results               Assessment/Plan             Assessment & Plan  Shortness of breath  She has acute respiratory failure due to combination of COPD and CHF.  See below.  Now that she is coming out of the ICU I will consult pulmonary.  She is still requiring a lot of respiratory support  Systolic heart failure secondary to coronary artery disease  This seems to be her primary problem.  She is now being rate controlled terms of her A-fib.  She is on Lasix milligrams IV per day.  At some point we will switch her to p.o. Lasix and add spironolactone.  Will get another chest x-ray tomorrow.  Appreciate cardiology input.  COPD (chronic obstructive pulmonary disease) with acute bronchitis (Multi)  She does not use home oxygen.  I think we can cut back on the steroids.  Will lower the Solu-Medrol to 20 mg IV twice a day.  She has been getting Zosyn.  Is supposed to stop it 5 days so I will let that run out.  Hyperglycemia  Need to check hemoglobin A1c.  Does  not have a history of diabetes but she has been on high-dose steroids.    She is somewhat obese and has lymphedema.  She is got superficial ulcer of her buttocks and her shin.  Wound care as ordered.  Will ask for a specialty mattress.  Will keep the Mcnamara in for now.  It seems obvious to me that she will need some time in a rehab facility.  We can start talking about that although she is not ready for discharge.      10/17/24:      Patient still requires high flow oxygen, currently on 30 L.  Chest x-ray done this morning, reviewed: Demonstrates about the same vascular congestion and cardiomegaly.  Increase Lasix    Nonsustained V. tach.  Potassium and magnesium levels are good.  Patient is on metoprolol 25 mg twice a day.  Discussed with Dr. You: He will increase metoprolol.    Leukocytosis, stress related, more likely.  Will monitor off antibiotics.     Lymphedema, chronic.  Stasis dermatitis.  No evidence of cellulitis on exam    Paroxysmal A-fib, currently in sinus rhythm.  Xarelto, metoprolol    Will add Protonix since patient is on Plavix and Xarelto    CO2 retention.  Will encouraged to use BiPAP during nighttime    Eventual plan to discharge to rehab once stable.  Not stable for discharge yet.    10/18/24:  Patient continues to improve.  Hopefully will be able to wean off high flow oxygen and transition to nasal cannula.  Had significant negative fluid balance yesterday, decreased Lasix to 40 mg twice a day.  Leukocytosis persistent slightly worse today, continue monitoring off antibiotics.      10/19/24:    Doing much better from cardiac standpoint.  Looks stable on 6 L.  His good urine output with hydration.  Hopefully, can switch to oral soon.  Atrial fibrillation, paroxysmal.  Remains in sinus rhythm in sinus rhythm  Pneumonia.  Patient received Zosyn IV for 5 days from October 11 to October 16s.  White count is little bit worse and patient still has some productive cough.  Will put on oral Augmentin  for 5 days.  Check urinalysis  Hopefully, can be discharged to rehab in 48 hours.    Divya Boggs MD

## 2024-10-19 NOTE — CARE PLAN
Kay had a good night. She is able to make needs known. No issues at this time.   Kay said that she wanted to do her wound change in the morning, but then said to do it later. Notified day shift RN. Bedside report given to day shift RN. Pt stable at time of transfer.     The patient's goals for the shift include  pt wants to get  good nights rest.   The clinical goals for the shift include wean off oxygen.      Problem: Skin  Goal: Decreased wound size/increased tissue granulation at next dressing change  Outcome: Progressing  Flowsheets (Taken 10/18/2024 1945)  Decreased wound size/increased tissue granulation at next dressing change: Promote sleep for wound healing  Goal: Participates in plan/prevention/treatment measures  Outcome: Progressing  Flowsheets (Taken 10/18/2024 1945)  Participates in plan/prevention/treatment measures: Discuss with provider PT/OT consult  Goal: Prevent/manage excess moisture  Outcome: Progressing  Flowsheets (Taken 10/18/2024 1945)  Prevent/manage excess moisture: Moisturize dry skin  Goal: Prevent/minimize sheer/friction injuries  Outcome: Progressing  Flowsheets (Taken 10/18/2024 1945)  Prevent/minimize sheer/friction injuries: Increase activity/out of bed for meals  Goal: Promote/optimize nutrition  Outcome: Progressing  Flowsheets (Taken 10/18/2024 1945)  Promote/optimize nutrition: Assist with feeding  Goal: Promote skin healing  Outcome: Progressing  Flowsheets (Taken 10/18/2024 1945)  Promote skin healing: Turn/reposition every 2 hours/use positioning/transfer devices

## 2024-10-19 NOTE — NURSING NOTE
Assumed care of pt. Pt in bed with bipap in place. NSR on the monitor. Bed low with call bell in reach.

## 2024-10-20 VITALS
HEART RATE: 67 BPM | BODY MASS INDEX: 53 KG/M2 | WEIGHT: 288 LBS | TEMPERATURE: 97.1 F | HEIGHT: 62 IN | DIASTOLIC BLOOD PRESSURE: 49 MMHG | OXYGEN SATURATION: 96 % | SYSTOLIC BLOOD PRESSURE: 140 MMHG | RESPIRATION RATE: 18 BRPM

## 2024-10-20 LAB
ALBUMIN SERPL BCP-MCNC: 3.2 G/DL (ref 3.4–5)
ALP SERPL-CCNC: 60 U/L (ref 33–110)
ALT SERPL W P-5'-P-CCNC: 24 U/L (ref 7–45)
ANION GAP SERPL CALCULATED.3IONS-SCNC: 10 MMOL/L (ref 10–20)
AST SERPL W P-5'-P-CCNC: 12 U/L (ref 9–39)
BASOPHILS # BLD AUTO: 0.02 X10*3/UL (ref 0–0.1)
BASOPHILS NFR BLD AUTO: 0.2 %
BILIRUB SERPL-MCNC: 0.9 MG/DL (ref 0–1.2)
BUN SERPL-MCNC: 34 MG/DL (ref 6–23)
CALCIUM SERPL-MCNC: 8.9 MG/DL (ref 8.6–10.3)
CHLORIDE SERPL-SCNC: 92 MMOL/L (ref 98–107)
CO2 SERPL-SCNC: 37 MMOL/L (ref 21–32)
CREAT SERPL-MCNC: 0.72 MG/DL (ref 0.5–1.05)
EGFRCR SERPLBLD CKD-EPI 2021: >90 ML/MIN/1.73M*2
EOSINOPHIL # BLD AUTO: 0.02 X10*3/UL (ref 0–0.7)
EOSINOPHIL NFR BLD AUTO: 0.2 %
ERYTHROCYTE [DISTWIDTH] IN BLOOD BY AUTOMATED COUNT: 17.7 % (ref 11.5–14.5)
GLUCOSE BLD MANUAL STRIP-MCNC: 152 MG/DL (ref 74–99)
GLUCOSE BLD MANUAL STRIP-MCNC: 156 MG/DL (ref 74–99)
GLUCOSE BLD MANUAL STRIP-MCNC: 162 MG/DL (ref 74–99)
GLUCOSE BLD MANUAL STRIP-MCNC: 173 MG/DL (ref 74–99)
GLUCOSE SERPL-MCNC: 179 MG/DL (ref 74–99)
HCT VFR BLD AUTO: 48.3 % (ref 36–46)
HGB BLD-MCNC: 15.3 G/DL (ref 12–16)
IMM GRANULOCYTES # BLD AUTO: 0.09 X10*3/UL (ref 0–0.7)
IMM GRANULOCYTES NFR BLD AUTO: 0.7 % (ref 0–0.9)
LYMPHOCYTES # BLD AUTO: 0.85 X10*3/UL (ref 1.2–4.8)
LYMPHOCYTES NFR BLD AUTO: 6.5 %
MAGNESIUM SERPL-MCNC: 2.3 MG/DL (ref 1.6–2.4)
MCH RBC QN AUTO: 26.4 PG (ref 26–34)
MCHC RBC AUTO-ENTMCNC: 31.7 G/DL (ref 32–36)
MCV RBC AUTO: 83 FL (ref 80–100)
MONOCYTES # BLD AUTO: 0.39 X10*3/UL (ref 0.1–1)
MONOCYTES NFR BLD AUTO: 3 %
NEUTROPHILS # BLD AUTO: 11.7 X10*3/UL (ref 1.2–7.7)
NEUTROPHILS NFR BLD AUTO: 89.4 %
NRBC BLD-RTO: 0 /100 WBCS (ref 0–0)
PHOSPHATE SERPL-MCNC: 4 MG/DL (ref 2.5–4.9)
PLATELET # BLD AUTO: 201 X10*3/UL (ref 150–450)
POTASSIUM SERPL-SCNC: 4.5 MMOL/L (ref 3.5–5.3)
PROT SERPL-MCNC: 6.6 G/DL (ref 6.4–8.2)
RBC # BLD AUTO: 5.79 X10*6/UL (ref 4–5.2)
SODIUM SERPL-SCNC: 134 MMOL/L (ref 136–145)
WBC # BLD AUTO: 13.1 X10*3/UL (ref 4.4–11.3)

## 2024-10-20 PROCEDURE — 2500000001 HC RX 250 WO HCPCS SELF ADMINISTERED DRUGS (ALT 637 FOR MEDICARE OP): Performed by: INTERNAL MEDICINE

## 2024-10-20 PROCEDURE — 2500000002 HC RX 250 W HCPCS SELF ADMINISTERED DRUGS (ALT 637 FOR MEDICARE OP, ALT 636 FOR OP/ED): Performed by: INTERNAL MEDICINE

## 2024-10-20 PROCEDURE — 36415 COLL VENOUS BLD VENIPUNCTURE: CPT | Performed by: INTERNAL MEDICINE

## 2024-10-20 PROCEDURE — 2060000001 HC INTERMEDIATE ICU ROOM DAILY

## 2024-10-20 PROCEDURE — 94640 AIRWAY INHALATION TREATMENT: CPT

## 2024-10-20 PROCEDURE — 82947 ASSAY GLUCOSE BLOOD QUANT: CPT

## 2024-10-20 PROCEDURE — 2500000004 HC RX 250 GENERAL PHARMACY W/ HCPCS (ALT 636 FOR OP/ED): Performed by: INTERNAL MEDICINE

## 2024-10-20 PROCEDURE — 84100 ASSAY OF PHOSPHORUS: CPT | Performed by: INTERNAL MEDICINE

## 2024-10-20 PROCEDURE — 80053 COMPREHEN METABOLIC PANEL: CPT | Performed by: INTERNAL MEDICINE

## 2024-10-20 PROCEDURE — 99232 SBSQ HOSP IP/OBS MODERATE 35: CPT | Performed by: INTERNAL MEDICINE

## 2024-10-20 PROCEDURE — 85025 COMPLETE CBC W/AUTO DIFF WBC: CPT | Performed by: INTERNAL MEDICINE

## 2024-10-20 PROCEDURE — 83735 ASSAY OF MAGNESIUM: CPT | Performed by: INTERNAL MEDICINE

## 2024-10-20 PROCEDURE — 2500000005 HC RX 250 GENERAL PHARMACY W/O HCPCS: Performed by: INTERNAL MEDICINE

## 2024-10-20 PROCEDURE — 9420000001 HC RT PATIENT EDUCATION 5 MIN

## 2024-10-20 RX ORDER — IPRATROPIUM BROMIDE AND ALBUTEROL SULFATE 2.5; .5 MG/3ML; MG/3ML
3 SOLUTION RESPIRATORY (INHALATION) 4 TIMES DAILY
Status: DISCONTINUED | OUTPATIENT
Start: 2024-10-20 | End: 2024-10-21

## 2024-10-20 RX ADMIN — DAPAGLIFLOZIN 10 MG: 10 TABLET, FILM COATED ORAL at 09:56

## 2024-10-20 RX ADMIN — LOSARTAN POTASSIUM 50 MG: 50 TABLET, FILM COATED ORAL at 09:56

## 2024-10-20 RX ADMIN — INSULIN LISPRO 3 UNITS: 100 INJECTION, SOLUTION INTRAVENOUS; SUBCUTANEOUS at 20:45

## 2024-10-20 RX ADMIN — METOPROLOL SUCCINATE 50 MG: 50 TABLET, EXTENDED RELEASE ORAL at 09:56

## 2024-10-20 RX ADMIN — METHYLPREDNISOLONE SODIUM SUCCINATE 20 MG: 40 INJECTION, POWDER, FOR SOLUTION INTRAMUSCULAR; INTRAVENOUS at 12:17

## 2024-10-20 RX ADMIN — NYSTATIN 1 APPLICATION: 100000 POWDER TOPICAL at 21:39

## 2024-10-20 RX ADMIN — IPRATROPIUM BROMIDE AND ALBUTEROL SULFATE 3 ML: .5; 3 SOLUTION RESPIRATORY (INHALATION) at 12:53

## 2024-10-20 RX ADMIN — FUROSEMIDE 40 MG: 10 INJECTION, SOLUTION INTRAMUSCULAR; INTRAVENOUS at 23:13

## 2024-10-20 RX ADMIN — NYSTATIN 1 APPLICATION: 100000 POWDER TOPICAL at 09:58

## 2024-10-20 RX ADMIN — ESCITALOPRAM OXALATE 10 MG: 10 TABLET ORAL at 09:56

## 2024-10-20 RX ADMIN — Medication 6 L/MIN: at 19:23

## 2024-10-20 RX ADMIN — IPRATROPIUM BROMIDE AND ALBUTEROL SULFATE 3 ML: .5; 3 SOLUTION RESPIRATORY (INHALATION) at 19:23

## 2024-10-20 RX ADMIN — FUROSEMIDE 40 MG: 10 INJECTION, SOLUTION INTRAMUSCULAR; INTRAVENOUS at 12:20

## 2024-10-20 RX ADMIN — METOPROLOL SUCCINATE 50 MG: 50 TABLET, EXTENDED RELEASE ORAL at 20:37

## 2024-10-20 RX ADMIN — AMOXICILLIN AND CLAVULANATE POTASSIUM 1 TABLET: 875; 125 TABLET, FILM COATED ORAL at 20:04

## 2024-10-20 RX ADMIN — LEVOTHYROXINE SODIUM 100 MCG: 0.1 TABLET ORAL at 05:16

## 2024-10-20 RX ADMIN — PANTOPRAZOLE SODIUM 40 MG: 40 TABLET, DELAYED RELEASE ORAL at 06:09

## 2024-10-20 RX ADMIN — RIVAROXABAN 20 MG: 20 TABLET, FILM COATED ORAL at 16:50

## 2024-10-20 RX ADMIN — INSULIN LISPRO 3 UNITS: 100 INJECTION, SOLUTION INTRAVENOUS; SUBCUTANEOUS at 16:51

## 2024-10-20 RX ADMIN — SIMVASTATIN 40 MG: 40 TABLET, FILM COATED ORAL at 20:04

## 2024-10-20 RX ADMIN — INSULIN LISPRO 3 UNITS: 100 INJECTION, SOLUTION INTRAVENOUS; SUBCUTANEOUS at 12:19

## 2024-10-20 RX ADMIN — BUDESONIDE INHALATION 0.5 MG: 0.5 SUSPENSION RESPIRATORY (INHALATION) at 19:23

## 2024-10-20 RX ADMIN — AMOXICILLIN AND CLAVULANATE POTASSIUM 1 TABLET: 875; 125 TABLET, FILM COATED ORAL at 09:56

## 2024-10-20 RX ADMIN — METHYLPREDNISOLONE SODIUM SUCCINATE 20 MG: 40 INJECTION, POWDER, FOR SOLUTION INTRAMUSCULAR; INTRAVENOUS at 23:13

## 2024-10-20 RX ADMIN — CLOPIDOGREL BISULFATE 75 MG: 75 TABLET ORAL at 09:56

## 2024-10-20 RX ADMIN — INSULIN LISPRO 3 UNITS: 100 INJECTION, SOLUTION INTRAVENOUS; SUBCUTANEOUS at 10:03

## 2024-10-20 RX ADMIN — Medication 6 L/MIN: at 05:15

## 2024-10-20 ASSESSMENT — COGNITIVE AND FUNCTIONAL STATUS - GENERAL
STANDING UP FROM CHAIR USING ARMS: A LITTLE
TURNING FROM BACK TO SIDE WHILE IN FLAT BAD: A LITTLE
MOVING TO AND FROM BED TO CHAIR: A LITTLE
DRESSING REGULAR LOWER BODY CLOTHING: A LITTLE
MOBILITY SCORE: 18
TOILETING: A LITTLE
MOBILITY SCORE: 18
EATING MEALS: A LITTLE
DRESSING REGULAR LOWER BODY CLOTHING: A LITTLE
PERSONAL GROOMING: A LITTLE
DRESSING REGULAR UPPER BODY CLOTHING: A LITTLE
STANDING UP FROM CHAIR USING ARMS: A LITTLE
CLIMB 3 TO 5 STEPS WITH RAILING: A LITTLE
HELP NEEDED FOR BATHING: A LITTLE
WALKING IN HOSPITAL ROOM: A LITTLE
DRESSING REGULAR UPPER BODY CLOTHING: A LITTLE
TURNING FROM BACK TO SIDE WHILE IN FLAT BAD: A LITTLE
PERSONAL GROOMING: A LITTLE
CLIMB 3 TO 5 STEPS WITH RAILING: A LITTLE
DAILY ACTIVITIY SCORE: 19
WALKING IN HOSPITAL ROOM: A LITTLE
DAILY ACTIVITIY SCORE: 18
MOVING FROM LYING ON BACK TO SITTING ON SIDE OF FLAT BED WITH BEDRAILS: A LITTLE
MOVING FROM LYING ON BACK TO SITTING ON SIDE OF FLAT BED WITH BEDRAILS: A LITTLE
HELP NEEDED FOR BATHING: A LITTLE
TOILETING: A LITTLE
MOVING TO AND FROM BED TO CHAIR: A LITTLE

## 2024-10-20 ASSESSMENT — PAIN - FUNCTIONAL ASSESSMENT: PAIN_FUNCTIONAL_ASSESSMENT: 0-10

## 2024-10-20 ASSESSMENT — PAIN SCALES - GENERAL
PAINLEVEL_OUTOF10: 0 - NO PAIN
PAINLEVEL_OUTOF10: 0 - NO PAIN

## 2024-10-20 ASSESSMENT — PAIN SCALES - WONG BAKER: WONGBAKER_NUMERICALRESPONSE: NO HURT

## 2024-10-20 NOTE — PROGRESS NOTES
Kay Pike is a 59 y.o. female on day 10 of admission presenting with Systolic heart failure secondary to coronary artery disease.      Subjective   Has been in the ICU since October 10 with acute respiratory failure attributed to CHF and COPD.  She has had an NSTEMI.  Has had a cardiac cath, no intervention.  Cardiology has been following.  She also had issues with A-fib.  She is on an appropriate cardiac regimen currently.  She has been diuresed.  Her EF is 40% .  She is also been on steroids and antibiotics.  Transferred from ICU on 10/15/24  10/17/24:  Patient did not use BiPAP last night, she did not think she needed it.  She was on high flow.  Blood gas done at 4 AM demonstrates pCO2 70.  Patient had a run of V. tach this morning, 17 beats   Patient denies shortness of breath or chest pain.  10/18/24:  Feels better today.  Used BiPAP last night.  Currently on 25 L high flow plan is to wean to nasal cannula today  10/19/24:  Feels better.  More active.  Currently on 6 L nasal cannula.  -3 L yesterday  When asked, admits to some cough or wheeze some thick sputum production  10/20/24:  Continues to improve.  Good response to diuretics.  Currently on 5 L of oxygen.  Still he has some cough with some sputum production    Objective   Alert oriented.  Looks better, more active  In no apparent distress at rest on 5L of oxygen per nasal cannula  Heart regular rate and rhythm  Lungs clear to auscultation  Abdomen soft nontender nondistended  Extremities: Bilateral lymphedema, improved significantly. there is a superficial ulceration on the left shin.  No hyperthermia  Neuro cranial nerves intact good tone strength in arms and legs  Last Recorded Vitals  BP (!) 140/44 (BP Location: Right arm, Patient Position: Lying)   Pulse 70   Temp 36.3 °C (97.3 °F) (Temporal)   Resp 16   Wt 131 kg (288 lb)   SpO2 97%   Intake/Output last 3 Shifts:    Intake/Output Summary (Last 24 hours) at 10/20/2024 1341  Last data filed  at 10/20/2024 1259  Gross per 24 hour   Intake --   Output 2850 ml   Net -2850 ml       Admission Weight  Weight: 127 kg (279 lb) (10/10/24 1903)    Daily Weight  10/20/24 : 131 kg (288 lb)    Image Results  XR chest 1 view  Narrative: Interpreted By:  Paula Lopez,   STUDY:  XR CHEST 1 VIEW 10/17/2024 9:04 am      INDICATION:  Signs/Symptoms:chf      COMPARISON:  10/15/2024      ACCESSION NUMBER(S):  [id number]      ORDERING CLINICIAN:  ASHANTI CANDELARIO      TECHNIQUE:  Single AP view chest      FINDINGS:  Cardiomediastinal silhouette is enlarged with moderate cardiomegaly.  There is mild generalized increased interstitial prominence which is  less conspicuous from the prior examination with improved  interstitial edema suggested. Hazy opacity at the right lung base  with component of layering effusion. Retrocardiac area obscured with  small layering effusion/compressive atelectasis                  Impression: 1. Improved interstitial edema with bilateral basilar effusions      Signed by: Paula Lopez 10/17/2024 11:32 AM  Dictation workstation:   [id number]      Physical Exam  Pt is NAD at rest on 5 L.  Nasal cannula.  Eating lunch  Cooperative with exam.  In no distress at rest.  A, Ox3.  Face is symmetrical.  Skin - no lesions.  Lungs: Diminished with few rhonchi at bases  Heart: regular S1S2.  Abdomen: soft, NT, ND. BS positive.  Extr.:  Significantly improved edema  Moves all extr.   Relevant Results               Assessment/Plan             Assessment & Plan  Shortness of breath  She has acute respiratory failure due to combination of COPD and CHF.  See below.  Now that she is coming out of the ICU I will consult pulmonary.  She is still requiring a lot of respiratory support  Systolic heart failure secondary to coronary artery disease  This seems to be her primary problem.  She is now being rate controlled terms of her A-fib.  She is on Lasix milligrams IV per day.  At some point we will switch her to  p.o. Lasix and add spironolactone.  Will get another chest x-ray tomorrow.  Appreciate cardiology input.  COPD (chronic obstructive pulmonary disease) with acute bronchitis (Multi)  She does not use home oxygen.  I think we can cut back on the steroids.  Will lower the Solu-Medrol to 20 mg IV twice a day.  She has been getting Zosyn.  Is supposed to stop it 5 days so I will let that run out.  Hyperglycemia  Need to check hemoglobin A1c.  Does not have a history of diabetes but she has been on high-dose steroids.    She is somewhat obese and has lymphedema.  She is got superficial ulcer of her buttocks and her shin.  Wound care as ordered.  Will ask for a specialty mattress.  Will keep the Mcnamara in for now.  It seems obvious to me that she will need some time in a rehab facility.  We can start talking about that although she is not ready for discharge.      10/17/24:      Patient still requires high flow oxygen, currently on 30 L.  Chest x-ray done this morning, reviewed: Demonstrates about the same vascular congestion and cardiomegaly.  Increase Lasix    Nonsustained V. tach.  Potassium and magnesium levels are good.  Patient is on metoprolol 25 mg twice a day.  Discussed with Dr. You: He will increase metoprolol.    Leukocytosis, stress related, more likely.  Will monitor off antibiotics.     Lymphedema, chronic.  Stasis dermatitis.  No evidence of cellulitis on exam    Paroxysmal A-fib, currently in sinus rhythm.  Xarelto, metoprolol    Will add Protonix since patient is on Plavix and Xarelto    CO2 retention.  Will encouraged to use BiPAP during nighttime    Eventual plan to discharge to rehab once stable.  Not stable for discharge yet.    10/18/24:  Patient continues to improve.  Hopefully will be able to wean off high flow oxygen and transition to nasal cannula.  Had significant negative fluid balance yesterday, decreased Lasix to 40 mg twice a day.  Leukocytosis persistent slightly worse today, continue  monitoring off antibiotics.      10/19/24:    Doing much better from cardiac standpoint.  Looks stable on 6 L.  His good urine output with hydration.  Hopefully, can switch to oral soon.  Atrial fibrillation, paroxysmal.  Remains in sinus rhythm in sinus rhythm  Pneumonia.  Patient received Zosyn IV for 5 days from October 11 to October 16s.  White count is little bit worse and patient still has some productive cough.  Will put on oral Augmentin for 5 days.  Check urinalysis  Hopefully, can be discharged to rehab in 48 hours.    10/20/24:    Continues to improve.  Today on 5 L of oxygen.  Remains in sinus rhythm.  Still has some cough and some rhonchi at bases.  White count is better today.  Continue IV diuresis and oral antibiotics.  Urinalysis: No evidence for UTI.      Divya Boggs MD

## 2024-10-20 NOTE — CARE PLAN
Problem: Respiratory  Goal: Clear secretions with interventions this shift  10/19/2024 2252 by Kelsie Santiago, RRT  Outcome: Progressing  10/19/2024 2251 by Kelsie Santiago RRT  Outcome: Progressing  Goal: Minimize anxiety/maximize coping throughout shift  10/19/2024 2252 by Kelsie Santiago RRT  Outcome: Progressing  10/19/2024 2251 by Kelsie Santiago RRT  Outcome: Progressing  Goal: Minimal/no exertional discomfort or dyspnea this shift  10/19/2024 2252 by Kelsie Santiago RRT  Outcome: Progressing  10/19/2024 2251 by Kelsie Santiago RRT  Outcome: Progressing  Goal: No signs of respiratory distress (eg. Use of accessory muscles. Peds grunting)  10/19/2024 2252 by Kelsie Santiago RRT  Outcome: Progressing  10/19/2024 2251 by Kelsie Santiago RRT  Outcome: Progressing  Goal: Patent airway maintained this shift  Outcome: Progressing  Goal: Tolerate pulmonary toileting this shift  Outcome: Progressing  Goal: Verbalize decreased shortness of breath this shift  Outcome: Progressing  Goal: Wean oxygen to maintain O2 saturation per order/standard this shift  Outcome: Progressing  Goal: Increase self care and/or family involvement in next 24 hours  Outcome: Progressing

## 2024-10-20 NOTE — CARE PLAN
"The patient's goals for the shift include \"to leave the ICU\"    The clinical goals for the shift include ween oxygen      "

## 2024-10-20 NOTE — CARE PLAN
Kay had a good night. She is able to make needs known. No issues at this time.   Bedside report given to day shift RN. Pt stable at time of transfer.       The patient's goals for the shift include get a good nights sleep.     The clinical goals for the shift include wean down oxygen needs.       Problem: Skin  Goal: Decreased wound size/increased tissue granulation at next dressing change  Outcome: Progressing  Flowsheets (Taken 10/19/2024 2000)  Decreased wound size/increased tissue granulation at next dressing change: Promote sleep for wound healing  Goal: Participates in plan/prevention/treatment measures  Outcome: Progressing  Flowsheets (Taken 10/19/2024 2000)  Participates in plan/prevention/treatment measures: Discuss with provider PT/OT consult  Goal: Prevent/manage excess moisture  Outcome: Progressing  Flowsheets (Taken 10/19/2024 2000)  Prevent/manage excess moisture: Moisturize dry skin  Goal: Prevent/minimize sheer/friction injuries  Outcome: Progressing  Flowsheets (Taken 10/19/2024 2000)  Prevent/minimize sheer/friction injuries: Increase activity/out of bed for meals  Goal: Promote/optimize nutrition  Outcome: Progressing  Flowsheets (Taken 10/19/2024 2000)  Promote/optimize nutrition: Assist with feeding  Goal: Promote skin healing  Outcome: Progressing  Flowsheets (Taken 10/19/2024 2000)  Promote skin healing: Assess skin/pad under line(s)/device(s)     Problem: Respiratory  Goal: Clear secretions with interventions this shift  Outcome: Progressing  Goal: Minimize anxiety/maximize coping throughout shift  Outcome: Progressing  Goal: Minimal/no exertional discomfort or dyspnea this shift  Outcome: Progressing  Goal: No signs of respiratory distress (eg. Use of accessory muscles. Peds grunting)  Outcome: Progressing  Goal: Patent airway maintained this shift  Outcome: Progressing  Goal: Tolerate pulmonary toileting this shift  Outcome: Progressing  Goal: Verbalize decreased shortness of breath  this shift  Outcome: Progressing  Goal: Wean oxygen to maintain O2 saturation per order/standard this shift  Outcome: Progressing  Goal: Increase self care and/or family involvement in next 24 hours  Outcome: Progressing

## 2024-10-20 NOTE — PROGRESS NOTES
Kay Pike is a 59 y.o. female on day 10 of admission presenting with Systolic heart failure secondary to coronary artery disease.    Subjective   Patient seen. Resting in bed. She reports her breathing continues to improve. Denies any chest pain. Reports a short episode of palpitations yesterday that lasted a couple of seconds. She feels her legs and feet are still slightly swollen.        Objective     Physical Exam  Constitutional:       General: She is not in acute distress.     Appearance: Normal appearance. She is obese. She is not ill-appearing, toxic-appearing or diaphoretic.   HENT:      Head: Normocephalic and atraumatic.      Mouth/Throat:      Mouth: Mucous membranes are moist.   Eyes:      Extraocular Movements: Extraocular movements intact.   Cardiovascular:      Rate and Rhythm: Normal rate and regular rhythm.      Pulses: Normal pulses.      Heart sounds: Normal heart sounds.   Pulmonary:      Effort: Pulmonary effort is normal. No respiratory distress.      Comments: Diminished bibasilar  Abdominal:      General: Bowel sounds are normal. There is no distension.      Palpations: Abdomen is soft.      Tenderness: There is no abdominal tenderness. There is no guarding.   Genitourinary:     Comments: Mcnamara draining clear, yellow urine  Musculoskeletal:         General: Normal range of motion.      Cervical back: Normal range of motion and neck supple.      Right lower leg: Edema present.      Left lower leg: Edema present.      Comments: Bilateral lymphadema   Skin:     General: Skin is warm and dry.      Capillary Refill: Capillary refill takes less than 2 seconds.   Neurological:      General: No focal deficit present.      Mental Status: She is alert and oriented to person, place, and time.   Psychiatric:         Mood and Affect: Mood normal.         Behavior: Behavior normal.         Last Recorded Vitals  Blood pressure 111/78, pulse 70, temperature 36.7 °C (98.1 °F), temperature source  "Temporal, resp. rate 18, height 1.575 m (5' 2.01\"), weight 131 kg (288 lb), SpO2 97%.  Intake/Output last 3 Shifts:  I/O last 3 completed shifts:  In: - (0 mL/kg)   Out: 4000 (30.6 mL/kg) [Urine:4000 (0.9 mL/kg/hr)]  Weight: 130.6 kg     Relevant Results           Current Facility-Administered Medications:     acetaminophen (Tylenol) tablet 650 mg, 650 mg, oral, q6h PRN, Divya Boggs MD, 650 mg at 10/16/24 1437    amoxicillin-pot clavulanate (Augmentin) 875-125 mg per tablet 1 tablet, 1 tablet, oral, q12h PORTIA, Divya Boggs MD, 1 tablet at 10/19/24 2147    budesonide (Pulmicort) 0.5 mg/2 mL nebulizer solution 0.5 mg, 0.5 mg, nebulization, BID, Divya Boggs MD, 0.5 mg at 10/19/24 1917    clopidogrel (Plavix) tablet 75 mg, 75 mg, oral, Daily, Divya Boggs MD, 75 mg at 10/19/24 0859    dapagliflozin propanediol (Farxiga) tablet 10 mg, 10 mg, oral, Daily, Divya Boggs MD, 10 mg at 10/19/24 0859    dextrose 50 % injection 12.5 g, 12.5 g, intravenous, q15 min PRN, Divya Boggs MD    dextrose 50 % injection 25 g, 25 g, intravenous, q15 min PRN, Divya Boggs MD    escitalopram (Lexapro) tablet 10 mg, 10 mg, oral, Daily, Divya Boggs MD, 10 mg at 10/19/24 0859    furosemide (Lasix) injection 40 mg, 40 mg, intravenous, q12h, Divya Boggs MD, 40 mg at 10/19/24 2351    glucagon (Glucagen) injection 1 mg, 1 mg, intramuscular, q15 min PRN, Divya Boggs MD    glucagon (Glucagen) injection 1 mg, 1 mg, intramuscular, q15 min PRN, Divya Boggs MD    hydrOXYzine HCL (Atarax) tablet 25 mg, 25 mg, oral, q6h PRN, Divya Boggs MD, 25 mg at 10/13/24 2053    insulin lispro (HumaLOG) injection 0-15 Units, 0-15 Units, subcutaneous, Before meals & nightly, Divya Boggs MD, 3 Units at 10/19/24 2130    ipratropium-albuteroL (Duo-Neb) 0.5-2.5 mg/3 mL nebulizer solution 3 mL, 3 mL, nebulization, q2h PRN, Divya Boggs MD, 3 mL at 10/18/24 1210    " ipratropium-albuteroL (Duo-Neb) 0.5-2.5 mg/3 mL nebulizer solution 3 mL, 3 mL, nebulization, 4x daily, Divya Boggs MD    levothyroxine (Synthroid, Levoxyl) tablet 100 mcg, 100 mcg, oral, Daily, Divya Boggs MD, 100 mcg at 10/20/24 0516    lidocaine-epinephrine (Xylocaine W/EPI) 2 %-1:100,000 injection 3 mL, 3 mL, subcutaneous, Once PRN, Divya Boggs MD    losartan (Cozaar) tablet 50 mg, 50 mg, oral, Daily, Divya Boggs MD, 50 mg at 10/19/24 0859    methylPREDNISolone sod succinate (SOLU-Medrol) 40 mg/mL injection 20 mg, 20 mg, intravenous, q12h, Divya Boggs MD, 20 mg at 10/19/24 2351    metoprolol succinate XL (Toprol-XL) 24 hr tablet 50 mg, 50 mg, oral, BID, Sha You DO, 50 mg at 10/19/24 0859    morphine injection 2 mg, 2 mg, intravenous, q6h PRN, Divya Boggs MD, 2 mg at 10/10/24 2133    nitroglycerin (Nitrostat) SL tablet 0.4 mg, 0.4 mg, sublingual, q5 min PRN, Divya Boggs MD    nystatin (Mycostatin) 100,000 unit/gram powder 1 Application, 1 Application, Topical, BID, Divya Boggs MD, 1 Application at 10/19/24 2130    oxygen (O2) therapy, , inhalation, Continuous PRN - O2/gases, Divya Boggs MD, 6 L/min at 10/20/24 0515    oxygen (O2) therapy, , inhalation, Continuous PRN - O2/gases, Divya Boggs MD    pantoprazole (ProtoNix) EC tablet 40 mg, 40 mg, oral, Daily before breakfast, Divya Boggs MD, 40 mg at 10/20/24 0609    perflutren protein A microsphere (Optison) injection 0.5 mL, 0.5 mL, intravenous, Once in imaging, Divya Boggs MD    polyethylene glycol (Glycolax, Miralax) packet 17 g, 17 g, oral, Daily PRN, Divya Boggs MD, 17 g at 10/12/24 1327    rivaroxaban (Xarelto) tablet 20 mg, 20 mg, oral, Daily with evening meal, Divya Boggs MD, 20 mg at 10/19/24 1622    simvastatin (Zocor) tablet 40 mg, 40 mg, oral, Nightly, Divya Boggs MD, 40 mg at 10/19/24 3462    sulfur hexafluoride microsphr  (Lumason) injection 24.28 mg, 2 mL, intravenous, Once in imaging, Divya Boggs MD       Results for orders placed or performed during the hospital encounter of 10/10/24 (from the past 24 hours)   POCT GLUCOSE   Result Value Ref Range    POCT Glucose 203 (H) 74 - 99 mg/dL   Urinalysis with Reflex Culture and Microscopic   Result Value Ref Range    Color, Urine Light-Yellow Light-Yellow, Yellow, Dark-Yellow    Appearance, Urine Clear Clear    Specific Gravity, Urine 1.010 1.005 - 1.035    pH, Urine 6.5 5.0, 5.5, 6.0, 6.5, 7.0, 7.5, 8.0    Protein, Urine NEGATIVE NEGATIVE, 10 (TRACE), 20 (TRACE) mg/dL    Glucose, Urine 70 (1+) (A) Normal mg/dL    Blood, Urine 0.5 (2+) (A) NEGATIVE    Ketones, Urine NEGATIVE NEGATIVE mg/dL    Bilirubin, Urine NEGATIVE NEGATIVE    Urobilinogen, Urine Normal Normal mg/dL    Nitrite, Urine NEGATIVE NEGATIVE    Leukocyte Esterase, Urine NEGATIVE NEGATIVE   Urinalysis Microscopic   Result Value Ref Range    WBC, Urine 1-5 1-5, NONE /HPF    RBC, Urine 3-5 NONE, 1-2, 3-5 /HPF    Yeast Hyphae, Urine PRESENT (A) NONE /HPF   POCT GLUCOSE   Result Value Ref Range    POCT Glucose 163 (H) 74 - 99 mg/dL   POCT GLUCOSE   Result Value Ref Range    POCT Glucose 191 (H) 74 - 99 mg/dL   CBC and Auto Differential   Result Value Ref Range    WBC 13.1 (H) 4.4 - 11.3 x10*3/uL    nRBC 0.0 0.0 - 0.0 /100 WBCs    RBC 5.79 (H) 4.00 - 5.20 x10*6/uL    Hemoglobin 15.3 12.0 - 16.0 g/dL    Hematocrit 48.3 (H) 36.0 - 46.0 %    MCV 83 80 - 100 fL    MCH 26.4 26.0 - 34.0 pg    MCHC 31.7 (L) 32.0 - 36.0 g/dL    RDW 17.7 (H) 11.5 - 14.5 %    Platelets 201 150 - 450 x10*3/uL    Neutrophils % 89.4 40.0 - 80.0 %    Immature Granulocytes %, Automated 0.7 0.0 - 0.9 %    Lymphocytes % 6.5 13.0 - 44.0 %    Monocytes % 3.0 2.0 - 10.0 %    Eosinophils % 0.2 0.0 - 6.0 %    Basophils % 0.2 0.0 - 2.0 %    Neutrophils Absolute 11.70 (H) 1.20 - 7.70 x10*3/uL    Immature Granulocytes Absolute, Automated 0.09 0.00 - 0.70 x10*3/uL     Lymphocytes Absolute 0.85 (L) 1.20 - 4.80 x10*3/uL    Monocytes Absolute 0.39 0.10 - 1.00 x10*3/uL    Eosinophils Absolute 0.02 0.00 - 0.70 x10*3/uL    Basophils Absolute 0.02 0.00 - 0.10 x10*3/uL   Comprehensive Metabolic Panel   Result Value Ref Range    Glucose 179 (H) 74 - 99 mg/dL    Sodium 134 (L) 136 - 145 mmol/L    Potassium 4.5 3.5 - 5.3 mmol/L    Chloride 92 (L) 98 - 107 mmol/L    Bicarbonate 37 (H) 21 - 32 mmol/L    Anion Gap 10 10 - 20 mmol/L    Urea Nitrogen 34 (H) 6 - 23 mg/dL    Creatinine 0.72 0.50 - 1.05 mg/dL    eGFR >90 >60 mL/min/1.73m*2    Calcium 8.9 8.6 - 10.3 mg/dL    Albumin 3.2 (L) 3.4 - 5.0 g/dL    Alkaline Phosphatase 60 33 - 110 U/L    Total Protein 6.6 6.4 - 8.2 g/dL    AST 12 9 - 39 U/L    Bilirubin, Total 0.9 0.0 - 1.2 mg/dL    ALT 24 7 - 45 U/L   Magnesium   Result Value Ref Range    Magnesium 2.30 1.60 - 2.40 mg/dL   Phosphorus   Result Value Ref Range    Phosphorus 4.0 2.5 - 4.9 mg/dL   POCT GLUCOSE   Result Value Ref Range    POCT Glucose 173 (H) 74 - 99 mg/dL              Assessment/Plan   Assessment & Plan  Shortness of breath    Systolic heart failure secondary to coronary artery disease    COPD (chronic obstructive pulmonary disease) with acute bronchitis (Multi)    Hyperglycemia    10/14: Be consulted for rapid heart rates initially suspected to be SVT.  Review of telemetry data as well as review of EKG in my opinion reveals a rapid atrial fibrillation with heart rates in the 160s.  Patient does not have a history of atrial fibrillation.  Most recent echocardiogram shows reduced ejection fraction of 40 to 45% with no significant atrial dilatation or abnormal valvular function.  Patient is currently experiencing an exacerbation of COPD.  She did undergo cardiac catheterization which revealed:  thrombus seen on cardiac catheterization distal diagonal 1.  No indication for intervention since the lesion is very distally and the artery diameter is small overall for  intervention.  Since cardiac catheterization the intensivist team focused on fluid volume management as she appeared to be fluid overloaded on admission as well exacerbation of COPD.  On assessment today her lung sounds are rhonchorous throughout.  She has 2-3 word conversational dyspnea.  She remains with lymphedema to bilateral lower extremities.  At this point would continue with oral metoprolol, however would increase this to 25 mg p.o. twice daily.  Noting reduced ejection fraction of 40% would like to try to avoid diltiazem at this time.  Will utilize digoxin 500 mcg x 1 followed by 250 mcg x 1 4 hours later.  Would plan to start the patient on 125 mcg of digoxin tomorrow morning.  Concerning anticoagulation patient's [id number] equals 4.  Anticoagulation is generally recommended.  At this point she has been placed on aspirin as well as ticagrelor after her recent cardiac catheterization with no stent placement.  Generally triple therapy is not recommended long term.  For today we will continue with her oral aspirin and Brilinta.  Will reevaluate tomorrow to assess burden of atrial fibrillation and at that point if she remains in atrial fibrillation we will likely stop aspirin and continue with ticagrelor and apixaban.  Time my assessment she does have rhonchorous breath sounds throughout.  She continues to receive IV furosemide which I agree with.  Will follow with you.      10/15: Improved over the past 24 hours.  Patient was given IV digoxin yesterday for  first diagnosed rapid atrial fibrillation.  This was very effective and the patient spontaneously converted back to a sinus rhythm at approximately 6 PM yesterday and has remained in a sinus rhythm since that time.  Additionally her beta-blocker was increased.  At this point we will trial discontinuation of digoxin and attempt to continue with beta-blocker alone.  Concerning anticoagulation as noted above [id number] equals 4.  Anticoagulation in this  patient is recommended.  She is currently on aspirin as well as ticagrelor.  Will stop oral aspirin and start the patient on rivaroxaban for stroke risk reduction.  Lung sounds have significantly improved with BiPAP overnight.  Overall improving.  Would like to follow 1 day further.  Will follow with you.     8/16: Remains in sinus rhythm.  Continue metoprolol.  Continue oral anticoagulation for stroke risk reduction.  Recent NSTEMI from first diagonal thromboembolism.  That being said coronary angiography does show some element of atherosclerotic disease.  I am switching her antiplatelet from Brilinta to Plavix.  Continue statin.  Reasonable to transfer to stepdown today.  Will follow.     8/17: 16 beat run of NSVT this morning asymptomatic. Will increase metoprolol. Continue GDMT. Getting IV lasix this AM, reasonable. Will follow     8/18: Would continue diuresis with IV Lasix, strict attention to intakes and outputs and daily weights.  Will continue to follow this patient with you.     10/19: Patient feeling good and denies any chest pain, SOB, palpitations. BP normotensive. SpO2 94% on 6L NC. Transitioned from HiFlow to NC yesterday. Labs today with sodium 134, potassium 4.6, creatinine 0.72, magnesium 2.46, WBC 16.0, hemoglobin 15.7. -200 mL fluid balance today so far. -3L total yesterday. Continue to diuresis; ancipitate transitioning to oral tomorrow. On Xarelto for stroke risk reduction. Remains in sinus rhythm on telemetry. Continue Plavix, Farxiga, metoprolol succ, statin, losartan. We will continue to follow.      10/20: Patient reports she continues to improve. Denies any chest pain. Reports an episode of palpitations yesterday that lasted a couple of seconds. BP normotensive. SpO2 97% on 5L NC. Telemetry with sinus rhythm and occasional runs of nonsustained SVT that last 3-5 beats. Labs today with sodium 134, potassium 4.5, creatinine 0.72, WBC 13.1, hemoglobin 15.3, magnesium 2.3. -2400mL fluid  balance. She continues to improve with diuresis; will hold off on transitioning to orals until tomorrow. Likely will then transition to torsemide. We will continue to follow.       I spent 30 minutes in the professional and overall care of this patient.      Kirti Morales, APRN-CNP

## 2024-10-21 ENCOUNTER — APPOINTMENT (OUTPATIENT)
Dept: RADIOLOGY | Facility: HOSPITAL | Age: 59
DRG: 280 | End: 2024-10-21
Payer: COMMERCIAL

## 2024-10-21 PROBLEM — I48.91 ATRIAL FIBRILLATION (MULTI): Status: ACTIVE | Noted: 2024-10-21

## 2024-10-21 PROBLEM — I50.21 ACUTE SYSTOLIC HEART FAILURE: Status: ACTIVE | Noted: 2024-10-16

## 2024-10-21 PROBLEM — I21.3 STEMI (ST ELEVATION MYOCARDIAL INFARCTION) (MULTI): Status: ACTIVE | Noted: 2024-10-21

## 2024-10-21 PROBLEM — J18.9 PNEUMONIA DUE TO INFECTIOUS ORGANISM: Status: ACTIVE | Noted: 2024-10-21

## 2024-10-21 PROBLEM — J96.01 ACUTE HYPOXIC RESPIRATORY FAILURE (MULTI): Status: ACTIVE | Noted: 2024-10-21

## 2024-10-21 PROBLEM — J96.22 ACUTE ON CHRONIC RESPIRATORY FAILURE WITH HYPERCAPNIA (MULTI): Status: ACTIVE | Noted: 2024-10-21

## 2024-10-21 LAB
ALBUMIN SERPL BCP-MCNC: 3.2 G/DL (ref 3.4–5)
ALP SERPL-CCNC: 59 U/L (ref 33–110)
ALT SERPL W P-5'-P-CCNC: 24 U/L (ref 7–45)
ANION GAP SERPL CALCULATED.3IONS-SCNC: 9 MMOL/L (ref 10–20)
AST SERPL W P-5'-P-CCNC: 11 U/L (ref 9–39)
BASOPHILS # BLD AUTO: 0.02 X10*3/UL (ref 0–0.1)
BASOPHILS NFR BLD AUTO: 0.2 %
BILIRUB SERPL-MCNC: 0.8 MG/DL (ref 0–1.2)
BUN SERPL-MCNC: 36 MG/DL (ref 6–23)
CALCIUM SERPL-MCNC: 8.7 MG/DL (ref 8.6–10.3)
CHLORIDE SERPL-SCNC: 93 MMOL/L (ref 98–107)
CO2 SERPL-SCNC: 36 MMOL/L (ref 21–32)
CREAT SERPL-MCNC: 0.71 MG/DL (ref 0.5–1.05)
EGFRCR SERPLBLD CKD-EPI 2021: >90 ML/MIN/1.73M*2
EOSINOPHIL # BLD AUTO: 0.03 X10*3/UL (ref 0–0.7)
EOSINOPHIL NFR BLD AUTO: 0.2 %
ERYTHROCYTE [DISTWIDTH] IN BLOOD BY AUTOMATED COUNT: 16.8 % (ref 11.5–14.5)
GLUCOSE BLD MANUAL STRIP-MCNC: 140 MG/DL (ref 74–99)
GLUCOSE BLD MANUAL STRIP-MCNC: 144 MG/DL (ref 74–99)
GLUCOSE BLD MANUAL STRIP-MCNC: 175 MG/DL (ref 74–99)
GLUCOSE SERPL-MCNC: 164 MG/DL (ref 74–99)
HCT VFR BLD AUTO: 45.4 % (ref 36–46)
HGB BLD-MCNC: 14.8 G/DL (ref 12–16)
IMM GRANULOCYTES # BLD AUTO: 0.1 X10*3/UL (ref 0–0.7)
IMM GRANULOCYTES NFR BLD AUTO: 0.8 % (ref 0–0.9)
LYMPHOCYTES # BLD AUTO: 1.03 X10*3/UL (ref 1.2–4.8)
LYMPHOCYTES NFR BLD AUTO: 8.5 %
MAGNESIUM SERPL-MCNC: 2.23 MG/DL (ref 1.6–2.4)
MCH RBC QN AUTO: 27.1 PG (ref 26–34)
MCHC RBC AUTO-ENTMCNC: 32.6 G/DL (ref 32–36)
MCV RBC AUTO: 83 FL (ref 80–100)
MONOCYTES # BLD AUTO: 0.63 X10*3/UL (ref 0.1–1)
MONOCYTES NFR BLD AUTO: 5.2 %
NEUTROPHILS # BLD AUTO: 10.25 X10*3/UL (ref 1.2–7.7)
NEUTROPHILS NFR BLD AUTO: 85.1 %
NRBC BLD-RTO: 0 /100 WBCS (ref 0–0)
PHOSPHATE SERPL-MCNC: 3.9 MG/DL (ref 2.5–4.9)
PLATELET # BLD AUTO: 200 X10*3/UL (ref 150–450)
POTASSIUM SERPL-SCNC: 3.7 MMOL/L (ref 3.5–5.3)
PROT SERPL-MCNC: 6.3 G/DL (ref 6.4–8.2)
RBC # BLD AUTO: 5.46 X10*6/UL (ref 4–5.2)
SODIUM SERPL-SCNC: 134 MMOL/L (ref 136–145)
WBC # BLD AUTO: 12.1 X10*3/UL (ref 4.4–11.3)

## 2024-10-21 PROCEDURE — 94660 CPAP INITIATION&MGMT: CPT

## 2024-10-21 PROCEDURE — 83735 ASSAY OF MAGNESIUM: CPT | Performed by: INTERNAL MEDICINE

## 2024-10-21 PROCEDURE — 2500000001 HC RX 250 WO HCPCS SELF ADMINISTERED DRUGS (ALT 637 FOR MEDICARE OP): Performed by: INTERNAL MEDICINE

## 2024-10-21 PROCEDURE — 2500000002 HC RX 250 W HCPCS SELF ADMINISTERED DRUGS (ALT 637 FOR MEDICARE OP, ALT 636 FOR OP/ED): Performed by: INTERNAL MEDICINE

## 2024-10-21 PROCEDURE — 84100 ASSAY OF PHOSPHORUS: CPT | Performed by: INTERNAL MEDICINE

## 2024-10-21 PROCEDURE — 71045 X-RAY EXAM CHEST 1 VIEW: CPT | Performed by: RADIOLOGY

## 2024-10-21 PROCEDURE — 97530 THERAPEUTIC ACTIVITIES: CPT | Mod: GO,CO

## 2024-10-21 PROCEDURE — 97535 SELF CARE MNGMENT TRAINING: CPT | Mod: GO,CO

## 2024-10-21 PROCEDURE — 82947 ASSAY GLUCOSE BLOOD QUANT: CPT

## 2024-10-21 PROCEDURE — 9420000001 HC RT PATIENT EDUCATION 5 MIN

## 2024-10-21 PROCEDURE — 2500000001 HC RX 250 WO HCPCS SELF ADMINISTERED DRUGS (ALT 637 FOR MEDICARE OP): Performed by: STUDENT IN AN ORGANIZED HEALTH CARE EDUCATION/TRAINING PROGRAM

## 2024-10-21 PROCEDURE — 2060000001 HC INTERMEDIATE ICU ROOM DAILY

## 2024-10-21 PROCEDURE — 36415 COLL VENOUS BLD VENIPUNCTURE: CPT | Performed by: INTERNAL MEDICINE

## 2024-10-21 PROCEDURE — 94640 AIRWAY INHALATION TREATMENT: CPT

## 2024-10-21 PROCEDURE — 99232 SBSQ HOSP IP/OBS MODERATE 35: CPT

## 2024-10-21 PROCEDURE — 71045 X-RAY EXAM CHEST 1 VIEW: CPT

## 2024-10-21 PROCEDURE — 80053 COMPREHEN METABOLIC PANEL: CPT | Performed by: INTERNAL MEDICINE

## 2024-10-21 PROCEDURE — 2500000002 HC RX 250 W HCPCS SELF ADMINISTERED DRUGS (ALT 637 FOR MEDICARE OP, ALT 636 FOR OP/ED): Performed by: STUDENT IN AN ORGANIZED HEALTH CARE EDUCATION/TRAINING PROGRAM

## 2024-10-21 PROCEDURE — 97530 THERAPEUTIC ACTIVITIES: CPT | Mod: GP

## 2024-10-21 PROCEDURE — 2500000004 HC RX 250 GENERAL PHARMACY W/ HCPCS (ALT 636 FOR OP/ED): Performed by: STUDENT IN AN ORGANIZED HEALTH CARE EDUCATION/TRAINING PROGRAM

## 2024-10-21 PROCEDURE — 85025 COMPLETE CBC W/AUTO DIFF WBC: CPT | Performed by: INTERNAL MEDICINE

## 2024-10-21 PROCEDURE — 97110 THERAPEUTIC EXERCISES: CPT | Mod: GP

## 2024-10-21 PROCEDURE — 99233 SBSQ HOSP IP/OBS HIGH 50: CPT | Performed by: STUDENT IN AN ORGANIZED HEALTH CARE EDUCATION/TRAINING PROGRAM

## 2024-10-21 PROCEDURE — 99232 SBSQ HOSP IP/OBS MODERATE 35: CPT | Performed by: STUDENT IN AN ORGANIZED HEALTH CARE EDUCATION/TRAINING PROGRAM

## 2024-10-21 RX ORDER — POLYETHYLENE GLYCOL 3350 17 G/17G
17 POWDER, FOR SOLUTION ORAL DAILY
Status: DISCONTINUED | OUTPATIENT
Start: 2024-10-21 | End: 2024-10-24 | Stop reason: HOSPADM

## 2024-10-21 RX ORDER — PREDNISONE 20 MG/1
20 TABLET ORAL DAILY
Status: COMPLETED | OUTPATIENT
Start: 2024-10-23 | End: 2024-10-24

## 2024-10-21 RX ORDER — PREDNISONE 10 MG/1
10 TABLET ORAL DAILY
Status: DISCONTINUED | OUTPATIENT
Start: 2024-10-25 | End: 2024-10-24 | Stop reason: HOSPADM

## 2024-10-21 RX ORDER — ALBUTEROL SULFATE 0.83 MG/ML
2.5 SOLUTION RESPIRATORY (INHALATION) EVERY 4 HOURS PRN
Status: DISCONTINUED | OUTPATIENT
Start: 2024-10-21 | End: 2024-10-24 | Stop reason: HOSPADM

## 2024-10-21 RX ORDER — FORMOTEROL FUMARATE DIHYDRATE 20 UG/2ML
20 SOLUTION RESPIRATORY (INHALATION)
Status: DISCONTINUED | OUTPATIENT
Start: 2024-10-21 | End: 2024-10-23

## 2024-10-21 RX ORDER — PREDNISONE 20 MG/1
20 TABLET ORAL
Status: COMPLETED | OUTPATIENT
Start: 2024-10-21 | End: 2024-10-22

## 2024-10-21 RX ADMIN — NYSTATIN 1 APPLICATION: 100000 POWDER TOPICAL at 20:11

## 2024-10-21 RX ADMIN — AMOXICILLIN AND CLAVULANATE POTASSIUM 1 TABLET: 875; 125 TABLET, FILM COATED ORAL at 09:27

## 2024-10-21 RX ADMIN — RIVAROXABAN 20 MG: 20 TABLET, FILM COATED ORAL at 18:16

## 2024-10-21 RX ADMIN — AMOXICILLIN AND CLAVULANATE POTASSIUM 1 TABLET: 875; 125 TABLET, FILM COATED ORAL at 20:11

## 2024-10-21 RX ADMIN — LOSARTAN POTASSIUM 50 MG: 50 TABLET, FILM COATED ORAL at 09:27

## 2024-10-21 RX ADMIN — FORMOTEROL FUMARATE DIHYDRATE 20 MCG: 20 SOLUTION RESPIRATORY (INHALATION) at 19:32

## 2024-10-21 RX ADMIN — BUDESONIDE INHALATION 0.5 MG: 0.5 SUSPENSION RESPIRATORY (INHALATION) at 08:17

## 2024-10-21 RX ADMIN — IPRATROPIUM BROMIDE AND ALBUTEROL SULFATE 3 ML: .5; 3 SOLUTION RESPIRATORY (INHALATION) at 08:17

## 2024-10-21 RX ADMIN — PANTOPRAZOLE SODIUM 40 MG: 40 TABLET, DELAYED RELEASE ORAL at 06:01

## 2024-10-21 RX ADMIN — IPRATROPIUM BROMIDE AND ALBUTEROL SULFATE 3 ML: .5; 3 SOLUTION RESPIRATORY (INHALATION) at 12:19

## 2024-10-21 RX ADMIN — METOPROLOL SUCCINATE 50 MG: 50 TABLET, EXTENDED RELEASE ORAL at 20:12

## 2024-10-21 RX ADMIN — BUDESONIDE INHALATION 0.5 MG: 0.5 SUSPENSION RESPIRATORY (INHALATION) at 19:32

## 2024-10-21 RX ADMIN — CLOPIDOGREL BISULFATE 75 MG: 75 TABLET ORAL at 09:28

## 2024-10-21 RX ADMIN — METOPROLOL SUCCINATE 50 MG: 50 TABLET, EXTENDED RELEASE ORAL at 09:27

## 2024-10-21 RX ADMIN — NYSTATIN 1 APPLICATION: 100000 POWDER TOPICAL at 09:28

## 2024-10-21 RX ADMIN — INSULIN LISPRO 3 UNITS: 100 INJECTION, SOLUTION INTRAVENOUS; SUBCUTANEOUS at 13:24

## 2024-10-21 RX ADMIN — SIMVASTATIN 40 MG: 40 TABLET, FILM COATED ORAL at 20:12

## 2024-10-21 RX ADMIN — POLYETHYLENE GLYCOL 3350 17 G: 17 POWDER, FOR SOLUTION ORAL at 14:05

## 2024-10-21 RX ADMIN — LEVOTHYROXINE SODIUM 100 MCG: 0.1 TABLET ORAL at 05:58

## 2024-10-21 RX ADMIN — PREDNISONE 20 MG: 20 TABLET ORAL at 18:16

## 2024-10-21 RX ADMIN — ACETAMINOPHEN 325MG 650 MG: 325 TABLET ORAL at 20:11

## 2024-10-21 RX ADMIN — DAPAGLIFLOZIN 10 MG: 10 TABLET, FILM COATED ORAL at 09:28

## 2024-10-21 RX ADMIN — ESCITALOPRAM OXALATE 10 MG: 10 TABLET ORAL at 09:28

## 2024-10-21 ASSESSMENT — ACTIVITIES OF DAILY LIVING (ADL)
BATHING_WHERE_ASSESSED: STANDING SINKSIDE
HOME_MANAGEMENT_TIME_ENTRY: 13
BATHING_LEVEL_OF_ASSISTANCE: SETUP;CLOSE SUPERVISION

## 2024-10-21 ASSESSMENT — COGNITIVE AND FUNCTIONAL STATUS - GENERAL
CLIMB 3 TO 5 STEPS WITH RAILING: TOTAL
DRESSING REGULAR UPPER BODY CLOTHING: A LITTLE
TOILETING: A LITTLE
PERSONAL GROOMING: A LITTLE
TURNING FROM BACK TO SIDE WHILE IN FLAT BAD: A LITTLE
MOBILITY SCORE: 16
MOVING TO AND FROM BED TO CHAIR: A LITTLE
DRESSING REGULAR LOWER BODY CLOTHING: A LOT
WALKING IN HOSPITAL ROOM: A LITTLE
STANDING UP FROM CHAIR USING ARMS: A LITTLE
DAILY ACTIVITIY SCORE: 18
HELP NEEDED FOR BATHING: A LITTLE
MOVING FROM LYING ON BACK TO SITTING ON SIDE OF FLAT BED WITH BEDRAILS: A LITTLE

## 2024-10-21 ASSESSMENT — PAIN - FUNCTIONAL ASSESSMENT
PAIN_FUNCTIONAL_ASSESSMENT: 0-10

## 2024-10-21 ASSESSMENT — PAIN SCALES - GENERAL
PAINLEVEL_OUTOF10: 0 - NO PAIN
PAINLEVEL_OUTOF10: 3
PAINLEVEL_OUTOF10: 0 - NO PAIN
PAINLEVEL_OUTOF10: 0 - NO PAIN

## 2024-10-21 ASSESSMENT — PAIN DESCRIPTION - DESCRIPTORS
DESCRIPTORS: ACHING
DESCRIPTORS: ACHING

## 2024-10-21 ASSESSMENT — PAIN SCALES - PAIN ASSESSMENT IN ADVANCED DEMENTIA (PAINAD)
FACIALEXPRESSION: SMILING OR INEXPRESSIVE
BREATHING: NORMAL
BODYLANGUAGE: RELAXED

## 2024-10-21 ASSESSMENT — PAIN DESCRIPTION - LOCATION: LOCATION: BACK

## 2024-10-21 ASSESSMENT — PAIN SCALES - WONG BAKER: WONGBAKER_NUMERICALRESPONSE: NO HURT

## 2024-10-21 ASSESSMENT — PAIN DESCRIPTION - ORIENTATION: ORIENTATION: MID

## 2024-10-21 NOTE — CARE PLAN
The patient's goals for the shift include wean off O2 as tolerated, work with PT    The clinical goals for the shift include wean off oxygen . up to chair    Over the shift, the patient did not make progress toward the following goals. Barriers to progression include none. Recommendations to address these barriers include continue to diurese.

## 2024-10-21 NOTE — ASSESSMENT & PLAN NOTE
Continue ICS, LABA, LAMA.   When closer to discharge will change ICS/LABA to MDI  Albuterol nebulizer PRN  Prednisone taper ordered (20mg BID for two days, 20 daily to 2 days, 10 daily for 2 days, then stop).   Medication list updated to reflect reported changes. Patient notified via portal.

## 2024-10-21 NOTE — PROGRESS NOTES
Physical Therapy    Physical Therapy Treatment    Patient Name: Kay Pike  MRN: [id number]  Department: [address]  Room: 16/16  Today's Date: 10/21/2024  Time Calculation  Start Time: 1047  Stop Time: 1114  Time Calculation (min): 27 min         Assessment/Plan   PT Assessment  End of Session Communication: Bedside nurse  Assessment Comment: pt demonstrating slowly improving overall functional mobility tolerance and ease , now on 4 liters o2. Endurance still limited, + fatigues easily  End of Session Patient Position: Up in chair, Alarm on (call button in reach)  PT Plan  Inpatient/Swing Bed or Outpatient: Inpatient  PT Plan  Treatment/Interventions: Bed mobility, Transfer training, Gait training, Strengthening, Therapeutic exercise, Therapeutic activity, Balance training, Endurance training  PT Plan: Ongoing PT  PT Frequency: 6 times per week  PT Discharge Recommendations: Moderate intensity level of continued care  Equipment Recommended upon Discharge: Wheeled walker  PT Recommended Transfer Status: Contact guard, Assistive device (FWW)  PT - OK to Discharge: Yes      General Visit Information:   PT  Visit  PT Received On: 10/21/24  General  Family/Caregiver Present: No  Prior to Session Communication: Bedside nurse  Patient Position Received: Bed, 2 rail up, Alarm off, not on at start of session  Preferred Learning Style: verbal, visual  General Comment: cleared by nurse for therapy; pt agreeable to therapy; + TELEMETRY, Mcnamara cath    Subjective   Precautions:  Precautions  Hearing/Visual Limitations: reading glasses  Medical Precautions: Fall precautions, Oxygen therapy device and L/min (4 liters o2 via nc)    Vital Signs (Past 2hrs)        Date/Time Vitals Session Patient Position Pulse Resp SpO2 BP MAP (mmHg)    10/21/24 1047 Pre PT  Lying  71  --  92 %  --  --     10/21/24 1124 --  --  --  18  92 %  127/36  60                   Vital Signs Comment: O2 sat 92% with HR 78 bpm after amb trial     Objective    Pain:  Pain Assessment  Pain Assessment: 0-10  0-10 (Numeric) Pain Score: 0 - No pain  Cognition:  Cognition  Overall Cognitive Status: Within Functional Limits  Orientation Level: Oriented X4  Safety/Judgement:  (decreased safety insight during functional mobility)  Insight: Mild  Coordination:  Movements are Fluid and Coordinated: Yes  Postural Control:  Postural Control  Posture Comment: obese with mild forward head  Extremity/Trunk Assessments:    Activity Tolerance:  Activity Tolerance  Endurance: Decreased tolerance for upright activites  Activity Tolerance Comments: fair +  Treatments:  Therapeutic Exercise  Therapeutic Exercise Performed: Yes  Therapeutic Exercise Activity 1: supine bilateral AP x 30 reps  Therapeutic Exercise Activity 2: supine vinay LE QS x 15 reps  Therapeutic Exercise Activity 3: supine vinay hip abd x 20 reps  Therapeutic Exercise Activity 4: supine vinay heel slides x 12 reps  Therapeutic Exercise Activity 5: seated vinay LAQ's x 20 reps  Therapeutic Exercise Activity 6: seated vinay marching x 20 reps    Therapeutic Activity  Therapeutic Activity Performed: Yes (see bed mobility, transfers, amb with FWw comments)    Bed Mobility  Bed Mobility: Yes  Bed Mobility 1  Bed Mobility 1: Supine to sitting  Level of Assistance 1: Close supervision, Minimal verbal cues  Bed Mobility Comments 1: head of bed elevated; verbal cues for ease of transition, use of bedrail    Ambulation/Gait Training  Ambulation/Gait Training Performed: Yes  Ambulation/Gait Training 1  Surface 1: Level tile  Device 1: Rolling walker  Assistance 1: Contact guard, Minimal verbal cues  Quality of Gait 1: Decreased step length, Diminished heel strike, Inconsistent stride length, Soft knee(s)  Comments/Distance (ft) 1: pt amb 14 ft x 2, + turns, verbal cues for step through sequencing focusing on heel to toe gait, pursed lip breathing. Leyda somewhat slow.  Ambulation/Gait Training 2  Surface 2: Level tile  Device 2: Rolling  walker  Assistance 2: Contact guard, Minimal verbal cues  Quality of Gait 2: Diminished heel strike, Inconsistent stride length, Decreased step length  Comments/Distance (ft) 2: After seated rest break of 2-3 min, pt again amb 14 ft x 2, + turns, verbal cues as on prior amb session. Overall balance fair +.  Transfers  Transfer: Yes  Transfer 1  Transfer From 1: Sit to  Transfer to 1: Stand  Technique 1: Sit to stand  Transfer Device 1: Walker  Transfer Level of Assistance 1: Contact guard, Minimal verbal cues  Trials/Comments 1: verbal cues for proper vinay hand placement on bed/arms of chair---x 3 trials during session  Transfers 2  Transfer From 2: Stand to  Transfer to 2: Sit  Technique 2: Stand to sit  Transfer Device 2: Walker  Transfer Level of Assistance 2: Contact guard, Minimal verbal cues  Trials/Comments 2: verbal cies for backing fully into chair, reaching back with both hands for arms of chair---x 3 trials during session    Stairs  Stairs: No    Outcome Measures:  [facility] Basic Mobility  Turning from your back to your side while in a flat bed without using bedrails: A little  Moving from lying on your back to sitting on the side of a flat bed without using bedrails: A little  Moving to and from bed to chair (including a wheelchair): A little  Standing up from a chair using your arms (e.g. wheelchair or bedside chair): A little  To walk in hospital room: A little  Climbing 3-5 steps with railing: Total  Basic Mobility - Total Score: 16    Education Documentation  Mobility Training, taught by Coni Fair, PT at 10/21/2024 11:30 AM.  Learner: Patient  Readiness: Acceptance  Method: Explanation  Response: Verbalizes Understanding, Needs Reinforcement    Education Comments  No comments found.        OP EDUCATION:       Encounter Problems       Encounter Problems (Active)       PT Problem       Pt will transition supine<>sit using hospital bed with mod I.  (Progressing)       Start:  10/14/24    Expected End:   11/10/24            Pt will transfer sit<>stand with FWW & mod I.  (Progressing)       Start:  10/14/24    Expected End:  11/10/24            Pt will ambulate >/=50 ft with FWW & mod I.  (Progressing)       Start:  10/14/24    Expected End:  11/10/24            Pt will demonstrate oxygen conservation strategies (eg, pursed lip breathing technique, no verbalization, etc) during exertive functional mobility/activities with at most 1 verbal cue (distant S). (Progressing)       Start:  10/14/24    Expected End:  11/10/24

## 2024-10-21 NOTE — ASSESSMENT & PLAN NOTE
Presumed bacterial pneumonia  S/p zosyn x5 days but continued to have cough, now on augmentin - plan for total of 5 days

## 2024-10-21 NOTE — PROGRESS NOTES
Occupational Therapy    OT Treatment    Patient Name: Kay Pike  MRN: [id number]  Department: [facility]  Room: 16/16  Today's Date: 10/21/2024  Time Calculation  Start Time: 1324  Stop Time: 1348  Time Calculation (min): 24 min        Assessment:  OT Assessment: Tolerated session well, demonstrating continued progression towards POC with cues for pacing and use of pursed lip breathing in effort to increase functional tolerance. Pt would benefit from continued skilled OT services to improve strength, balance, and functional tolerance to increase independence with ADL tasks  End of Session Communication: Bedside nurse  End of Session Patient Position: Up in chair, Alarm on  OT Assessment Results: Decreased ADL status, Decreased endurance, Decreased functional mobility  Plan:  Treatment Interventions: ADL retraining, Functional transfer training, Endurance training, Patient/family training, Equipment evaluation/education, Compensatory technique education, Continued evaluation  OT Frequency: 4 times per week  OT Discharge Recommendations: Moderate intensity level of continued care  OT Recommended Transfer Status: Minimal assist, Assist of 1  OT - OK to Discharge: Yes  Treatment Interventions: ADL retraining, Functional transfer training, Endurance training, Patient/family training, Equipment evaluation/education, Compensatory technique education, Continued evaluation    Subjective   Previous Visit Info:  OT Last Visit  OT Received On: 10/21/24  General:  General  Prior to Session Communication: Bedside nurse  Patient Position Received: Up in chair, Alarm on  General Comment: Cleared for therapy per RN. Pt seated in chair upon arrival and agreeable to tx  Precautions:  Hearing/Visual Limitations: reading glasses  Medical Precautions: Fall precautions, Oxygen therapy device and L/min (4L O2 nc)    Vital Signs (Past 2hrs)        Date/Time Vitals Session Patient Position Pulse Resp SpO2 BP MAP (mmHg)    10/21/24 1324 --   --  --  --  93 %  --  --                   Vital Signs Comment: desat 89% during functional tasks, increased at rest     Pain:  Pain Assessment  Pain Assessment: 0-10  0-10 (Numeric) Pain Score: 0 - No pain    Objective    Cognition:  Cognition  Overall Cognitive Status: Within Functional Limits  Insight: Mild    Activities of Daily Living:    Grooming  Grooming Level of Assistance: Setup, Close supervision  Grooming Where Assessed: Standing sinkside  Grooming Comments: face washing standing at sink    UE Bathing  UE Bathing Level of Assistance: Setup, Close supervision  UE Bathing Where Assessed: Standing sinkside  UE Bathing Comments: increased time + effort required for UB bathing. Supplied with deodorant wipes to utilize at later time    LE Bathing  LE Bathing Level of Assistance: Setup, Close supervision  LE Bathing Where Assessed: Standing sinkside  LE Bathing Comments: periarea cleanse standing at sink with increased effort required    UE Dressing  UE Dressing Level of Assistance: Minimum assistance  UE Dressing Comments: don/doff gown    Toileting  Toileting Level of Assistance: Close supervision  Where Assessed: Toilet  Toileting Comments: increased time required for toileting task with pt completing pericare hygiene seated. Blood noticed in toilet with RN made aware  Functional Standing Tolerance:  Time: 12 min  Functional Standing Tolerance Comments: tolerated standing sinkside during ADL tasks  Bed Mobility/Transfers:    Transfers  Transfer: Yes  Transfer 1  Technique 1: Sit to stand, Stand to sit  Transfer Device 1: Walker  Transfer Level of Assistance 1: Contact guard, Minimal verbal cues  Trials/Comments 1: assist for balance with cues for proper hand placement and eccentric lowering to chair    Toilet Transfers  Toilet Transfer Type: To and from  Toilet Transfer to: Standard toilet  Toilet Transfers: Contact guard  Toilet Transfers Comments: assist for trunk elevation and forward weightshifting with  cues for use of grab bar for UE support    Functional Mobility:  Functional Mobility  Functional Mobility Performed: Yes  Functional Mobility 1  Device 1: Rolling walker  Assistance 1: Contact guard, Minimal verbal cues  Comments 1: functional mobility short household distance x2 at FWW with cues for pacing and use of pursed lip breathing, demonstrating fair balance    Standing Balance:  Dynamic Standing Balance  Dynamic Standing-Level of Assistance: Close supervision  Dynamic Standing-Balance: Forward lean, Reaching for objects, Reaching across midline  Dynamic Standing-Comments: fair balance during LB bathing at sink    Outcome Measures:[facility] Daily Activity  Putting on and taking off regular lower body clothing: A lot  Bathing (including washing, rinsing, drying): A little  Putting on and taking off regular upper body clothing: A little  Toileting, which includes using toilet, bedpan or urinal: A little  Taking care of personal grooming such as brushing teeth: A little  Eating Meals: None  Daily Activity - Total Score: 18    Education Documentation  Body Mechanics, taught by TOMMY Flores at 10/21/2024  2:42 PM.  Learner: Patient  Readiness: Acceptance  Method: Explanation  Response: Verbalizes Understanding    Home Exercise Program, taught by TOMMY Flores at 10/21/2024  2:42 PM.  Learner: Patient  Readiness: Acceptance  Method: Explanation  Response: Verbalizes Understanding    ADL Training, taught by TOMMY Flores at 10/21/2024  2:42 PM.  Learner: Patient  Readiness: Acceptance  Method: Explanation  Response: Verbalizes Understanding    Education Comments  No comments found.        Problem: Bathing  Goal: LTG - Patient will utilize adaptive techniques to bathe body Min A  Outcome: Progressing     Problem: Dressings Lower Extremities  Goal: LTG - Patient will dress lower body Min A  Outcome: Progressing     Problem: Dressing Upper Extremities  Goal: LTG - Patient will  complete upper body dressing Mod I  Outcome: Progressing     Problem: Functional Mobility  Goal: Pt will demonstrate sitting/standing tolerance x 4 min, without significant inc in RR to complete ADL routine.   Outcome: Progressing     Problem: Toileting  Goal: LTG - Patient will complete daily toileting tasks Min A  Outcome: Progressing

## 2024-10-21 NOTE — NURSING NOTE
Patient resting quietly in bed.  4L O2 via n/c intact and functioning.  Offers no complaints of pain/discomfort.  Appetite good.  Sat up in chair for majority of shift.  Tolerated well.  Continent of BM.  Did note that twice patient had BM and urine observed in BSC.  Message to Dr Schwab about removing randolph catheter tomorrow AM for trial void.  Call light in reach.  Bed locked and in low position, encouraged to make needs known.

## 2024-10-21 NOTE — ASSESSMENT & PLAN NOTE
Continue nocturnal BiPap  Pt will need sleep study as an outpatient  May be able to qualify for bipap based on COPD and chronic CO2 retention (hypoventilation). Will need AM ABG after a night without bipap when closer to discharge.

## 2024-10-21 NOTE — PROGRESS NOTES
"Kay Pike is a 59 y.o. female on day 11 of admission presenting with Acute systolic heart failure.    Subjective   Down to 4L NC  Continuing to diurese  Breathing feels much better  Has never had a sleep study  Does not have a known diagnosis of VLADIMIR       Objective     GENERAL APPEARANCE: Healthy appearing, in no acute distress  SKIN: no rashes  HEAD, EYES, EARS, NOSE, THROAT: Without sinus tenderness. Conjunctiva and sclera clear. T Oropharynx unremarkable  NECK: No lymphadenopathy  CHEST: AP Diameter normal. No retractions. Auscultation reveals good air exchange without crackles or wheeze. Dry cough  HEART: Normal rhythm, without murmur  ABDOMEN: Not distended. Normal bowel sounds. Soft and non-tender to palpation, without hepatomegaly or splenomegaly. No masses  EXTREMITIES: Without cyanosis. No clubbing. Still has significant LE edema with skin changes due to chronic venous stasis and chronic edema  LYMPHATIC: No lymphadenopathy  MUSCULOSKELETAL: Unremarkable   NEUROLOGIC: Grossly intact      Last Recorded Vitals  Blood pressure (!) 113/33, pulse 70, temperature 36.4 °C (97.5 °F), temperature source Temporal, resp. rate 16, height 1.575 m (5' 2.01\"), weight 132 kg (290 lb 9.6 oz), SpO2 95%.  Intake/Output last 3 Shifts:  I/O last 3 completed shifts:  In: - (0 mL/kg)   Out: 4225 (32.1 mL/kg) [Urine:4225 (0.9 mL/kg/hr)]  Weight: 131.8 kg     Relevant Results  Results for orders placed or performed during the hospital encounter of 10/10/24 (from the past 24 hours)   POCT GLUCOSE   Result Value Ref Range    POCT Glucose 162 (H) 74 - 99 mg/dL   CBC and Auto Differential   Result Value Ref Range    WBC 12.1 (H) 4.4 - 11.3 x10*3/uL    nRBC 0.0 0.0 - 0.0 /100 WBCs    RBC 5.46 (H) 4.00 - 5.20 x10*6/uL    Hemoglobin 14.8 12.0 - 16.0 g/dL    Hematocrit 45.4 36.0 - 46.0 %    MCV 83 80 - 100 fL    MCH 27.1 26.0 - 34.0 pg    MCHC 32.6 32.0 - 36.0 g/dL    RDW 16.8 (H) 11.5 - 14.5 %    Platelets 200 150 - 450 x10*3/uL    " Neutrophils % 85.1 40.0 - 80.0 %    Immature Granulocytes %, Automated 0.8 0.0 - 0.9 %    Lymphocytes % 8.5 13.0 - 44.0 %    Monocytes % 5.2 2.0 - 10.0 %    Eosinophils % 0.2 0.0 - 6.0 %    Basophils % 0.2 0.0 - 2.0 %    Neutrophils Absolute 10.25 (H) 1.20 - 7.70 x10*3/uL    Immature Granulocytes Absolute, Automated 0.10 0.00 - 0.70 x10*3/uL    Lymphocytes Absolute 1.03 (L) 1.20 - 4.80 x10*3/uL    Monocytes Absolute 0.63 0.10 - 1.00 x10*3/uL    Eosinophils Absolute 0.03 0.00 - 0.70 x10*3/uL    Basophils Absolute 0.02 0.00 - 0.10 x10*3/uL   Comprehensive Metabolic Panel   Result Value Ref Range    Glucose 164 (H) 74 - 99 mg/dL    Sodium 134 (L) 136 - 145 mmol/L    Potassium 3.7 3.5 - 5.3 mmol/L    Chloride 93 (L) 98 - 107 mmol/L    Bicarbonate 36 (H) 21 - 32 mmol/L    Anion Gap 9 (L) 10 - 20 mmol/L    Urea Nitrogen 36 (H) 6 - 23 mg/dL    Creatinine 0.71 0.50 - 1.05 mg/dL    eGFR >90 >60 mL/min/1.73m*2    Calcium 8.7 8.6 - 10.3 mg/dL    Albumin 3.2 (L) 3.4 - 5.0 g/dL    Alkaline Phosphatase 59 33 - 110 U/L    Total Protein 6.3 (L) 6.4 - 8.2 g/dL    AST 11 9 - 39 U/L    Bilirubin, Total 0.8 0.0 - 1.2 mg/dL    ALT 24 7 - 45 U/L   Magnesium   Result Value Ref Range    Magnesium 2.23 1.60 - 2.40 mg/dL   Phosphorus   Result Value Ref Range    Phosphorus 3.9 2.5 - 4.9 mg/dL   POCT GLUCOSE   Result Value Ref Range    POCT Glucose 140 (H) 74 - 99 mg/dL   POCT GLUCOSE   Result Value Ref Range    POCT Glucose 175 (H) 74 - 99 mg/dL           Assessment/Plan   Assessment & Plan  Acute systolic heart failure  Continuing to diurese  Cardiology managing  COPD (chronic obstructive pulmonary disease) with acute bronchitis (Multi)  Continue ICS, LABA, LAMA.   When closer to discharge will change ICS/LABA to MDI  Albuterol nebulizer PRN  Prednisone taper ordered (20mg BID for two days, 20 daily to 2 days, 10 daily for 2 days, then stop).  Acute hypoxic respiratory failure (Multi)  Continue to wean O2 as able  Incentive spirometer  ordered    Pneumonia due to infectious organism  Continue antibiotics per ID  Acute on chronic respiratory failure with hypercapnia (Multi)  Continue nocturnal BiPap  Pt will need sleep study as an outpatient  May be able to qualify for bipap based on COPD and chronic CO2 retention (hypoventilation). Will need AM ABG after a night without bipap when closer to discharge.     Pt requested help with medication schedule as well as help with figuring out medication copays when she is closer to discharge. If available a pharmacy education consult may be beneficial.        I spent 35 minutes in the professional and overall care of this patient.      Yenny Garsia MD PhD

## 2024-10-21 NOTE — PROGRESS NOTES
Kay Pike is a 59 y.o. female on day 11 of admission presenting with Acute systolic heart failure.      Subjective   Sitting up in chair eating lunch. States she feels well today. Denies SOB and chest pain. Does not wear oxygen at home but has significant smoking history. Had a BM this morning but endorses some constipation, requesting miralax. [place]arting PO.        Objective     Last Recorded Vitals  BP (!) 127/36 (BP Location: Right arm, Patient Position: Sitting)   Pulse 71   Temp 36.1 °C (97 °F) (Temporal)   Resp 18   Wt 132 kg (290 lb 9.6 oz)   SpO2 92%   Intake/Output last 3 Shifts:    Intake/Output Summary (Last 24 hours) at 10/21/2024 1323  Last data filed at 10/21/2024 1027  Gross per 24 hour   Intake 360 ml   Output 2175 ml   Net -1815 ml       Admission Weight  Weight: 127 kg (279 lb) (10/10/24 1903)    Daily Weight  10/21/24 : 132 kg (290 lb 9.6 oz)    Image Results  XR chest 1 view  Narrative: Interpreted By:  Salvador Hickman,   STUDY:  XR CHEST 1 VIEW; 10/21/2024 9:07 am      INDICATION:  Signs/Symptoms:CHF      COMPARISON:  10/17/2024.      ACCESSION NUMBER(S):  [id number]      ORDERING CLINICIAN:  JAYRO ALEMAN      FINDINGS:  The study is limited due to rotation, respiratory motion and poor  inspiratory effort, with resultant crowding of the pulmonary  vasculature. The cardiomediastinal silhouette is within normal limits  for the technique. Bilateral effusions and bibasilar  infiltrates/atelectatic changes are again present, similar or  slightly improved since the prior exam. There is no pneumothorax.  The osseous structures are unremarkable.      Impression: Limited study. Bilateral pleural effusions and  infiltrates/atelectatic changes, similar or slightly improved since  the prior exam. Correlate clinically and further follow-up as needed.      Signed by: Salvador Hickman 10/21/2024 10:11 AM  Dictation workstation:   [id number]      Physical Exam  Constitutional:       General: She is  not in acute distress.     Appearance: She is not toxic-appearing.   HENT:      Head: Normocephalic.      Mouth/Throat:      Pharynx: Oropharynx is clear.   Eyes:      General: No scleral icterus.  Cardiovascular:      Rate and Rhythm: Normal rate.   Pulmonary:      Effort: No respiratory distress.      Breath sounds: No wheezing.      Comments: 4L NC  Abdominal:      General: There is no distension.      Palpations: Abdomen is soft.      Tenderness: There is no abdominal tenderness.   Musculoskeletal:      Right lower leg: Edema present.      Left lower leg: Edema present.   Skin:     Comments: Hyperkeratosis and chronic venous stasis of B/L lower extremities   Neurological:      Mental Status: She is alert and oriented to person, place, and time.         Relevant Results               Assessment/Plan          This patient has a urinary catheter   Reason for the urinary catheter remaining today? critically ill patient who need accurate urinary output measurements          Assessment & Plan  Acute hypoxic respiratory failure (Multi)  Due to flash pulmonary edema and COPD exacerbation  Continue supplemental oxygen, wean as able  COPD (chronic obstructive pulmonary disease) with acute bronchitis (Multi)  Taper steroids  Continue aerosols PRN  Will need outpatient follow up with pulmonology   Shortness of breath  2/2 above  Acute systolic heart failure  EF 40-45%  Cardiology following  Continue diuresis per cardiology  Continue GDMT  Hyperglycemia  HbA1c 6.7%  Suspect hyperglycemia is due to steroid use  Continue SSI  Hypoglycemia protocol   STEMI (ST elevation myocardial infarction) (Multi)  S/p cardiac cath 10/10 - no occlusions  Cardiology following  Continue plavix and statin  Pneumonia due to infectious organism  Presumed bacterial pneumonia  S/p zosyn x5 days but continued to have cough, now on augmentin - plan for total of 5 days  Atrial fibrillation (Multi)  Management per cardiology   Continue xarelto and  metoprolol     Plan:  Continue IV lasix per cardiology  Taper steroids  Wean O2 as able  PT/OT  Anticipate discharge to SNF when cleared by cardiology               Vonda Schwab MD

## 2024-10-21 NOTE — CARE PLAN
Kay overall has good night. No issues at this time. She is able to make her needs known. Bedside report given to day shift RN. Pt stable at time of change of shift     The patient's goals for the shift include get  good night sleep.   The clinical goals for the shift include Wean off oxygen depands        Problem: Safety - Adult  Goal: Free from fall injury  Outcome: Progressing     Problem: Skin  Goal: Decreased wound size/increased tissue granulation at next dressing change  Outcome: Progressing  Flowsheets (Taken 10/20/2024 2000)  Decreased wound size/increased tissue granulation at next dressing change: Promote sleep for wound healing  Goal: Participates in plan/prevention/treatment measures  Outcome: Progressing  Flowsheets (Taken 10/20/2024 2000)  Participates in plan/prevention/treatment measures: Discuss with provider PT/OT consult  Goal: Prevent/manage excess moisture  Outcome: Progressing  Flowsheets (Taken 10/20/2024 2000)  Prevent/manage excess moisture: Moisturize dry skin  Goal: Prevent/minimize sheer/friction injuries  Outcome: Progressing  Flowsheets (Taken 10/20/2024 2000)  Prevent/minimize sheer/friction injuries: Increase activity/out of bed for meals  Goal: Promote/optimize nutrition  Outcome: Progressing  Flowsheets (Taken 10/20/2024 2000)  Promote/optimize nutrition: Monitor/record intake including meals  Goal: Promote skin healing  Outcome: Progressing  Flowsheets (Taken 10/20/2024 2000)  Promote skin healing: Assess skin/pad under line(s)/device(s)

## 2024-10-21 NOTE — NURSING NOTE
Assumed care of patient.  Resting quietly in bed with eyes closed.  Respirations even and unlabored on 4L O2 via n/c.  No visible distress observed.  Mcnamara to CD draining clear yellow urine.  Call light and commonly used items in reach.  Bed locked and in low position.

## 2024-10-21 NOTE — PROGRESS NOTES
Spiritual Care Visit    Clinical Encounter Type  Visited With: Patient not available  Routine Visit: Follow-up  Continue Visiting: Yes         Values/Beliefs  Spiritual Requests During Hospitalization: Kay was having Patient Care when I tried to visit her.     Stephane Felder

## 2024-10-21 NOTE — PROGRESS NOTES
"Kay Pike is a 59 y.o. female on day 11 of admission presenting with Systolic heart failure secondary to coronary artery disease.    Subjective   Patient resting comfortably in bed. Denies chest pain or pressure.        Objective     Physical Exam  Vitals reviewed.   Constitutional:       General: She is not in acute distress.     Appearance: She is obese.   Cardiovascular:      Rate and Rhythm: Normal rate and regular rhythm.      Heart sounds: No murmur heard.     No friction rub. No gallop.   Pulmonary:      Breath sounds: No wheezing, rhonchi or rales.      Comments: Diminished breath sounds throughout  Abdominal:      General: Bowel sounds are normal.      Palpations: Abdomen is soft.   Musculoskeletal:      Right lower leg: Edema present.      Left lower leg: Edema present.      Comments: Lymphedema to bilateral lower extremities.   Skin:     General: Skin is warm and dry.   Neurological:      Mental Status: She is alert and oriented to person, place, and time.   Psychiatric:         Mood and Affect: Mood normal.         Behavior: Behavior normal.         Last Recorded Vitals  Blood pressure (!) 111/47, pulse 61, temperature 36.4 °C (97.5 °F), temperature source Temporal, resp. rate 18, height 1.575 m (5' 2.01\"), weight 132 kg (290 lb 9.6 oz), SpO2 95%.  Intake/Output last 3 Shifts:    Intake/Output Summary (Last 24 hours) at 10/21/2024 0833  Last data filed at 10/21/2024 0334  Gross per 24 hour   Intake --   Output 2825 ml   Net -2825 ml        Relevant Results  Results for orders placed or performed during the hospital encounter of 10/10/24 (from the past 24 hours)   POCT GLUCOSE   Result Value Ref Range    POCT Glucose 156 (H) 74 - 99 mg/dL   POCT GLUCOSE   Result Value Ref Range    POCT Glucose 152 (H) 74 - 99 mg/dL   POCT GLUCOSE   Result Value Ref Range    POCT Glucose 162 (H) 74 - 99 mg/dL   CBC and Auto Differential   Result Value Ref Range    WBC 12.1 (H) 4.4 - 11.3 x10*3/uL    nRBC 0.0 0.0 - 0.0 " /100 WBCs    RBC 5.46 (H) 4.00 - 5.20 x10*6/uL    Hemoglobin 14.8 12.0 - 16.0 g/dL    Hematocrit 45.4 36.0 - 46.0 %    MCV 83 80 - 100 fL    MCH 27.1 26.0 - 34.0 pg    MCHC 32.6 32.0 - 36.0 g/dL    RDW 16.8 (H) 11.5 - 14.5 %    Platelets 200 150 - 450 x10*3/uL    Neutrophils % 85.1 40.0 - 80.0 %    Immature Granulocytes %, Automated 0.8 0.0 - 0.9 %    Lymphocytes % 8.5 13.0 - 44.0 %    Monocytes % 5.2 2.0 - 10.0 %    Eosinophils % 0.2 0.0 - 6.0 %    Basophils % 0.2 0.0 - 2.0 %    Neutrophils Absolute 10.25 (H) 1.20 - 7.70 x10*3/uL    Immature Granulocytes Absolute, Automated 0.10 0.00 - 0.70 x10*3/uL    Lymphocytes Absolute 1.03 (L) 1.20 - 4.80 x10*3/uL    Monocytes Absolute 0.63 0.10 - 1.00 x10*3/uL    Eosinophils Absolute 0.03 0.00 - 0.70 x10*3/uL    Basophils Absolute 0.02 0.00 - 0.10 x10*3/uL   Comprehensive Metabolic Panel   Result Value Ref Range    Glucose 164 (H) 74 - 99 mg/dL    Sodium 134 (L) 136 - 145 mmol/L    Potassium 3.7 3.5 - 5.3 mmol/L    Chloride 93 (L) 98 - 107 mmol/L    Bicarbonate 36 (H) 21 - 32 mmol/L    Anion Gap 9 (L) 10 - 20 mmol/L    Urea Nitrogen 36 (H) 6 - 23 mg/dL    Creatinine 0.71 0.50 - 1.05 mg/dL    eGFR >90 >60 mL/min/1.73m*2    Calcium 8.7 8.6 - 10.3 mg/dL    Albumin 3.2 (L) 3.4 - 5.0 g/dL    Alkaline Phosphatase 59 33 - 110 U/L    Total Protein 6.3 (L) 6.4 - 8.2 g/dL    AST 11 9 - 39 U/L    Bilirubin, Total 0.8 0.0 - 1.2 mg/dL    ALT 24 7 - 45 U/L   Magnesium   Result Value Ref Range    Magnesium 2.23 1.60 - 2.40 mg/dL   Phosphorus   Result Value Ref Range    Phosphorus 3.9 2.5 - 4.9 mg/dL          Assessment/Plan   Assessment & Plan  Shortness of breath    Systolic heart failure secondary to coronary artery disease    COPD (chronic obstructive pulmonary disease) with acute bronchitis (Multi)    Hyperglycemia    10/14: Be consulted for rapid heart rates initially suspected to be SVT.  Review of telemetry data as well as review of EKG in my opinion reveals a rapid atrial  fibrillation with heart rates in the 160s.  Patient does not have a history of atrial fibrillation.  Most recent echocardiogram shows reduced ejection fraction of 40 to 45% with no significant atrial dilatation or abnormal valvular function.  Patient is currently experiencing an exacerbation of COPD.  She did undergo cardiac catheterization which revealed:  thrombus seen on cardiac catheterization distal diagonal 1.  No indication for intervention since the lesion is very distally and the artery diameter is small overall for intervention.  Since cardiac catheterization the intensivist team focused on fluid volume management as she appeared to be fluid overloaded on admission as well exacerbation of COPD.  On assessment today her lung sounds are rhonchorous throughout.  She has 2-3 word conversational dyspnea.  She remains with lymphedema to bilateral lower extremities.  At this point would continue with oral metoprolol, however would increase this to 25 mg p.o. twice daily.  Noting reduced ejection fraction of 40% would like to try to avoid diltiazem at this time.  Will utilize digoxin 500 mcg x 1 followed by 250 mcg x 1 4 hours later.  Would plan to start the patient on 125 mcg of digoxin tomorrow morning.  Concerning anticoagulation patient's [id number] equals 4.  Anticoagulation is generally recommended.  At this point she has been placed on aspirin as well as ticagrelor after her recent cardiac catheterization with no stent placement.  Generally triple therapy is not recommended long term.  For today we will continue with her oral aspirin and Brilinta.  Will reevaluate tomorrow to assess burden of atrial fibrillation and at that point if she remains in atrial fibrillation we will likely stop aspirin and continue with ticagrelor and apixaban.  Time my assessment she does have rhonchorous breath sounds throughout.  She continues to receive IV furosemide which I agree with.  Will follow with you.      10/15:  Improved over the past 24 hours.  Patient was given IV digoxin yesterday for  first diagnosed rapid atrial fibrillation.  This was very effective and the patient spontaneously converted back to a sinus rhythm at approximately 6 PM yesterday and has remained in a sinus rhythm since that time.  Additionally her beta-blocker was increased.  At this point we will trial discontinuation of digoxin and attempt to continue with beta-blocker alone.  Concerning anticoagulation as noted above [id number] equals 4.  Anticoagulation in this patient is recommended.  She is currently on aspirin as well as ticagrelor.  Will stop oral aspirin and start the patient on rivaroxaban for stroke risk reduction.  Lung sounds have significantly improved with BiPAP overnight.  Overall improving.  Would like to follow 1 day further.  Will follow with you.     8/16: Remains in sinus rhythm.  Continue metoprolol.  Continue oral anticoagulation for stroke risk reduction.  Recent NSTEMI from first diagonal thromboembolism.  That being said coronary angiography does show some element of atherosclerotic disease.  I am switching her antiplatelet from Brilinta to Plavix.  Continue statin.  Reasonable to transfer to stepdown today.  Will follow.     8/17: 16 beat run of NSVT this morning asymptomatic. Will increase metoprolol. Continue GDMT. Getting IV lasix this AM, reasonable. Will follow     8/18: Would continue diuresis with IV Lasix, strict attention to intakes and outputs and daily weights.  Will continue to follow this patient with you.     10/19: Patient feeling good and denies any chest pain, SOB, palpitations. BP normotensive. SpO2 94% on 6L NC. Transitioned from HiFlow to NC yesterday. Labs today with sodium 134, potassium 4.6, creatinine 0.72, magnesium 2.46, WBC 16.0, hemoglobin 15.7. -200 mL fluid balance today so far. -3L total yesterday. Continue to diuresis; ancipitate transitioning to oral tomorrow. On Xarelto for stroke risk  reduction. Remains in sinus rhythm on telemetry. Continue Plavix, Farxiga, metoprolol succ, statin, losartan. We will continue to follow.      10/20: Patient reports she continues to improve. Denies any chest pain. Reports an episode of palpitations yesterday that lasted a couple of seconds. BP normotensive. SpO2 97% on 5L NC. Telemetry with sinus rhythm and occasional runs of nonsustained SVT that last 3-5 beats. Labs today with sodium 134, potassium 4.5, creatinine 0.72, WBC 13.1, hemoglobin 15.3, magnesium 2.3. -2400mL fluid balance. She continues to improve with diuresis; will hold off on transitioning to orals until tomorrow. Likely will then transition to torsemide. We will continue to follow.    10/21: As above.  Patient resting comfortably in bed.  Denies chest pain, pressure or palpitations.  Remains on nasal cannula oxygen at 4 L adequate pulse oximetries.  The patient is in sinus rhythm on telemetry with few brief runs of nonsustained supraventricular tachycardia lasting no more than 6 beats.  Lab work this morning revealed sodium 134, potassium 3.7, creatinine 0.71 and hemoglobin of 14.8.  Patient with a -2.8L fluid balance over the last 24 hours.  Patient remains on guideline directed medical therapy with metoprolol succinate, losartan and Farxiga.  Will continue furosemide 40 mg IV push every 12 hours.  I am going to repeat a chest x-ray this morning to assess for pulmonary vascular congestion.  We will continue to follow this patient with you.        Yenny Pastrana, APRN-CNP

## 2024-10-21 NOTE — PROGRESS NOTES
10/21/24 1314   Discharge Planning   Expected Discharge Disposition SNF   Does the patient need discharge transport arranged? Yes   RoundTrip coordination needed? Yes     Updated notes attached to referral and sent, awaiting final review and acceptance.      UPDATE 1530:  Updated PT OT notes sent to [facility], they are able to accept.  Facility to initiate precert at this time.     DC Plan NOT secure  Do NOT DC without speaking to care coordination, PRECERT PENDING FOR SNF

## 2024-10-21 NOTE — NURSING NOTE
Assumed care of patient she is alert and oriented . Patient is lying in bed and is on 4L of oxygen.Patient denies pain at this time , call light in reach.

## 2024-10-22 PROBLEM — K60.40 RECTAL FISTULA: Status: ACTIVE | Noted: 2024-10-22

## 2024-10-22 LAB
ALBUMIN SERPL BCP-MCNC: 3.1 G/DL (ref 3.4–5)
ALP SERPL-CCNC: 62 U/L (ref 33–110)
ALT SERPL W P-5'-P-CCNC: 27 U/L (ref 7–45)
ANION GAP SERPL CALCULATED.3IONS-SCNC: 10 MMOL/L (ref 10–20)
AST SERPL W P-5'-P-CCNC: 13 U/L (ref 9–39)
BASOPHILS # BLD AUTO: 0.02 X10*3/UL (ref 0–0.1)
BASOPHILS NFR BLD AUTO: 0.2 %
BILIRUB SERPL-MCNC: 0.9 MG/DL (ref 0–1.2)
BUN SERPL-MCNC: 38 MG/DL (ref 6–23)
CALCIUM SERPL-MCNC: 8.5 MG/DL (ref 8.6–10.3)
CHLORIDE SERPL-SCNC: 94 MMOL/L (ref 98–107)
CO2 SERPL-SCNC: 34 MMOL/L (ref 21–32)
CREAT SERPL-MCNC: 0.67 MG/DL (ref 0.5–1.05)
EGFRCR SERPLBLD CKD-EPI 2021: >90 ML/MIN/1.73M*2
EOSINOPHIL # BLD AUTO: 0.03 X10*3/UL (ref 0–0.7)
EOSINOPHIL NFR BLD AUTO: 0.2 %
ERYTHROCYTE [DISTWIDTH] IN BLOOD BY AUTOMATED COUNT: 16.6 % (ref 11.5–14.5)
GLUCOSE BLD MANUAL STRIP-MCNC: 117 MG/DL (ref 74–99)
GLUCOSE BLD MANUAL STRIP-MCNC: 166 MG/DL (ref 74–99)
GLUCOSE BLD MANUAL STRIP-MCNC: 173 MG/DL (ref 74–99)
GLUCOSE BLD MANUAL STRIP-MCNC: 208 MG/DL (ref 74–99)
GLUCOSE SERPL-MCNC: 148 MG/DL (ref 74–99)
HCT VFR BLD AUTO: 43.5 % (ref 36–46)
HGB BLD-MCNC: 14.5 G/DL (ref 12–16)
IMM GRANULOCYTES # BLD AUTO: 0.09 X10*3/UL (ref 0–0.7)
IMM GRANULOCYTES NFR BLD AUTO: 0.7 % (ref 0–0.9)
LYMPHOCYTES # BLD AUTO: 1.39 X10*3/UL (ref 1.2–4.8)
LYMPHOCYTES NFR BLD AUTO: 11.5 %
MAGNESIUM SERPL-MCNC: 2.31 MG/DL (ref 1.6–2.4)
MCH RBC QN AUTO: 27 PG (ref 26–34)
MCHC RBC AUTO-ENTMCNC: 33.3 G/DL (ref 32–36)
MCV RBC AUTO: 81 FL (ref 80–100)
MONOCYTES # BLD AUTO: 0.92 X10*3/UL (ref 0.1–1)
MONOCYTES NFR BLD AUTO: 7.6 %
NEUTROPHILS # BLD AUTO: 9.63 X10*3/UL (ref 1.2–7.7)
NEUTROPHILS NFR BLD AUTO: 79.8 %
NRBC BLD-RTO: 0 /100 WBCS (ref 0–0)
PHOSPHATE SERPL-MCNC: 3.8 MG/DL (ref 2.5–4.9)
PLATELET # BLD AUTO: 192 X10*3/UL (ref 150–450)
POTASSIUM SERPL-SCNC: 3.6 MMOL/L (ref 3.5–5.3)
PROT SERPL-MCNC: 6.1 G/DL (ref 6.4–8.2)
RBC # BLD AUTO: 5.38 X10*6/UL (ref 4–5.2)
SODIUM SERPL-SCNC: 134 MMOL/L (ref 136–145)
WBC # BLD AUTO: 12.1 X10*3/UL (ref 4.4–11.3)

## 2024-10-22 PROCEDURE — 94660 CPAP INITIATION&MGMT: CPT

## 2024-10-22 PROCEDURE — 2060000001 HC INTERMEDIATE ICU ROOM DAILY

## 2024-10-22 PROCEDURE — 97535 SELF CARE MNGMENT TRAINING: CPT | Mod: GO,CO

## 2024-10-22 PROCEDURE — 36415 COLL VENOUS BLD VENIPUNCTURE: CPT | Performed by: INTERNAL MEDICINE

## 2024-10-22 PROCEDURE — 94640 AIRWAY INHALATION TREATMENT: CPT

## 2024-10-22 PROCEDURE — 9420000001 HC RT PATIENT EDUCATION 5 MIN

## 2024-10-22 PROCEDURE — 2500000002 HC RX 250 W HCPCS SELF ADMINISTERED DRUGS (ALT 637 FOR MEDICARE OP, ALT 636 FOR OP/ED): Performed by: INTERNAL MEDICINE

## 2024-10-22 PROCEDURE — 97116 GAIT TRAINING THERAPY: CPT | Mod: GP,CQ

## 2024-10-22 PROCEDURE — 99233 SBSQ HOSP IP/OBS HIGH 50: CPT | Performed by: STUDENT IN AN ORGANIZED HEALTH CARE EDUCATION/TRAINING PROGRAM

## 2024-10-22 PROCEDURE — 2500000001 HC RX 250 WO HCPCS SELF ADMINISTERED DRUGS (ALT 637 FOR MEDICARE OP): Performed by: STUDENT IN AN ORGANIZED HEALTH CARE EDUCATION/TRAINING PROGRAM

## 2024-10-22 PROCEDURE — 2500000001 HC RX 250 WO HCPCS SELF ADMINISTERED DRUGS (ALT 637 FOR MEDICARE OP): Performed by: INTERNAL MEDICINE

## 2024-10-22 PROCEDURE — 97110 THERAPEUTIC EXERCISES: CPT | Mod: GP,CQ

## 2024-10-22 PROCEDURE — 2500000004 HC RX 250 GENERAL PHARMACY W/ HCPCS (ALT 636 FOR OP/ED): Performed by: STUDENT IN AN ORGANIZED HEALTH CARE EDUCATION/TRAINING PROGRAM

## 2024-10-22 PROCEDURE — 2500000002 HC RX 250 W HCPCS SELF ADMINISTERED DRUGS (ALT 637 FOR MEDICARE OP, ALT 636 FOR OP/ED): Performed by: STUDENT IN AN ORGANIZED HEALTH CARE EDUCATION/TRAINING PROGRAM

## 2024-10-22 PROCEDURE — 99222 1ST HOSP IP/OBS MODERATE 55: CPT

## 2024-10-22 PROCEDURE — 84100 ASSAY OF PHOSPHORUS: CPT | Performed by: INTERNAL MEDICINE

## 2024-10-22 PROCEDURE — 97530 THERAPEUTIC ACTIVITIES: CPT | Mod: GO,CO

## 2024-10-22 PROCEDURE — 82947 ASSAY GLUCOSE BLOOD QUANT: CPT

## 2024-10-22 PROCEDURE — 2500000004 HC RX 250 GENERAL PHARMACY W/ HCPCS (ALT 636 FOR OP/ED): Performed by: INTERNAL MEDICINE

## 2024-10-22 PROCEDURE — 80053 COMPREHEN METABOLIC PANEL: CPT | Performed by: INTERNAL MEDICINE

## 2024-10-22 PROCEDURE — 85025 COMPLETE CBC W/AUTO DIFF WBC: CPT | Performed by: INTERNAL MEDICINE

## 2024-10-22 PROCEDURE — 99232 SBSQ HOSP IP/OBS MODERATE 35: CPT

## 2024-10-22 PROCEDURE — 83735 ASSAY OF MAGNESIUM: CPT | Performed by: INTERNAL MEDICINE

## 2024-10-22 RX ADMIN — METOPROLOL SUCCINATE 50 MG: 50 TABLET, EXTENDED RELEASE ORAL at 08:20

## 2024-10-22 RX ADMIN — INSULIN LISPRO 6 UNITS: 100 INJECTION, SOLUTION INTRAVENOUS; SUBCUTANEOUS at 20:57

## 2024-10-22 RX ADMIN — FORMOTEROL FUMARATE DIHYDRATE 20 MCG: 20 SOLUTION RESPIRATORY (INHALATION) at 08:53

## 2024-10-22 RX ADMIN — LOSARTAN POTASSIUM 50 MG: 50 TABLET, FILM COATED ORAL at 08:20

## 2024-10-22 RX ADMIN — PREDNISONE 20 MG: 20 TABLET ORAL at 17:31

## 2024-10-22 RX ADMIN — INSULIN LISPRO 3 UNITS: 100 INJECTION, SOLUTION INTRAVENOUS; SUBCUTANEOUS at 13:00

## 2024-10-22 RX ADMIN — FORMOTEROL FUMARATE DIHYDRATE 20 MCG: 20 SOLUTION RESPIRATORY (INHALATION) at 19:18

## 2024-10-22 RX ADMIN — PREDNISONE 20 MG: 20 TABLET ORAL at 08:20

## 2024-10-22 RX ADMIN — NYSTATIN 1 APPLICATION: 100000 POWDER TOPICAL at 08:20

## 2024-10-22 RX ADMIN — BUDESONIDE INHALATION 0.5 MG: 0.5 SUSPENSION RESPIRATORY (INHALATION) at 08:53

## 2024-10-22 RX ADMIN — CLOPIDOGREL BISULFATE 75 MG: 75 TABLET ORAL at 08:20

## 2024-10-22 RX ADMIN — INSULIN LISPRO 3 UNITS: 100 INJECTION, SOLUTION INTRAVENOUS; SUBCUTANEOUS at 17:30

## 2024-10-22 RX ADMIN — RIVAROXABAN 20 MG: 20 TABLET, FILM COATED ORAL at 17:31

## 2024-10-22 RX ADMIN — FUROSEMIDE 40 MG: 10 INJECTION, SOLUTION INTRAMUSCULAR; INTRAVENOUS at 00:32

## 2024-10-22 RX ADMIN — PANTOPRAZOLE SODIUM 40 MG: 40 TABLET, DELAYED RELEASE ORAL at 06:53

## 2024-10-22 RX ADMIN — AMOXICILLIN AND CLAVULANATE POTASSIUM 1 TABLET: 875; 125 TABLET, FILM COATED ORAL at 20:48

## 2024-10-22 RX ADMIN — LEVOTHYROXINE SODIUM 100 MCG: 0.1 TABLET ORAL at 06:53

## 2024-10-22 RX ADMIN — SIMVASTATIN 40 MG: 40 TABLET, FILM COATED ORAL at 20:48

## 2024-10-22 RX ADMIN — BUDESONIDE INHALATION 0.5 MG: 0.5 SUSPENSION RESPIRATORY (INHALATION) at 19:24

## 2024-10-22 RX ADMIN — ESCITALOPRAM OXALATE 10 MG: 10 TABLET ORAL at 08:20

## 2024-10-22 RX ADMIN — DAPAGLIFLOZIN 10 MG: 10 TABLET, FILM COATED ORAL at 08:20

## 2024-10-22 RX ADMIN — AMOXICILLIN AND CLAVULANATE POTASSIUM 1 TABLET: 875; 125 TABLET, FILM COATED ORAL at 08:20

## 2024-10-22 RX ADMIN — FUROSEMIDE 40 MG: 10 INJECTION, SOLUTION INTRAMUSCULAR; INTRAVENOUS at 11:51

## 2024-10-22 RX ADMIN — METOPROLOL SUCCINATE 50 MG: 50 TABLET, EXTENDED RELEASE ORAL at 20:48

## 2024-10-22 RX ADMIN — ACETAMINOPHEN 325MG 650 MG: 325 TABLET ORAL at 20:48

## 2024-10-22 ASSESSMENT — COGNITIVE AND FUNCTIONAL STATUS - GENERAL
PERSONAL GROOMING: A LITTLE
MOVING FROM LYING ON BACK TO SITTING ON SIDE OF FLAT BED WITH BEDRAILS: A LITTLE
TOILETING: A LITTLE
STANDING UP FROM CHAIR USING ARMS: A LITTLE
CLIMB 3 TO 5 STEPS WITH RAILING: TOTAL
DRESSING REGULAR LOWER BODY CLOTHING: A LITTLE
PERSONAL GROOMING: A LITTLE
MOVING FROM LYING ON BACK TO SITTING ON SIDE OF FLAT BED WITH BEDRAILS: A LITTLE
MOBILITY SCORE: 16
TOILETING: A LITTLE
MOVING TO AND FROM BED TO CHAIR: A LITTLE
TURNING FROM BACK TO SIDE WHILE IN FLAT BAD: A LITTLE
HELP NEEDED FOR BATHING: A LITTLE
MOBILITY SCORE: 17
STANDING UP FROM CHAIR USING ARMS: A LITTLE
STANDING UP FROM CHAIR USING ARMS: A LITTLE
DRESSING REGULAR UPPER BODY CLOTHING: A LITTLE
DRESSING REGULAR LOWER BODY CLOTHING: A LITTLE
DRESSING REGULAR LOWER BODY CLOTHING: A LOT
DAILY ACTIVITIY SCORE: 19
DRESSING REGULAR UPPER BODY CLOTHING: A LITTLE
CLIMB 3 TO 5 STEPS WITH RAILING: A LOT
DAILY ACTIVITIY SCORE: 18
DRESSING REGULAR UPPER BODY CLOTHING: A LITTLE
WALKING IN HOSPITAL ROOM: A LITTLE
TURNING FROM BACK TO SIDE WHILE IN FLAT BAD: A LITTLE
MOVING TO AND FROM BED TO CHAIR: A LITTLE
HELP NEEDED FOR BATHING: A LITTLE
TURNING FROM BACK TO SIDE WHILE IN FLAT BAD: A LITTLE
MOVING TO AND FROM BED TO CHAIR: A LITTLE
WALKING IN HOSPITAL ROOM: A LITTLE
HELP NEEDED FOR BATHING: A LITTLE
CLIMB 3 TO 5 STEPS WITH RAILING: TOTAL
DAILY ACTIVITIY SCORE: 19
MOVING FROM LYING ON BACK TO SITTING ON SIDE OF FLAT BED WITH BEDRAILS: A LITTLE
WALKING IN HOSPITAL ROOM: A LITTLE
PERSONAL GROOMING: A LITTLE
TOILETING: A LITTLE
MOBILITY SCORE: 16

## 2024-10-22 ASSESSMENT — PAIN DESCRIPTION - DESCRIPTORS
DESCRIPTORS: ACHING
DESCRIPTORS: ACHING

## 2024-10-22 ASSESSMENT — PAIN SCALES - GENERAL
PAINLEVEL_OUTOF10: 3
PAINLEVEL_OUTOF10: 0 - NO PAIN

## 2024-10-22 ASSESSMENT — ACTIVITIES OF DAILY LIVING (ADL): HOME_MANAGEMENT_TIME_ENTRY: 14

## 2024-10-22 ASSESSMENT — ENCOUNTER SYMPTOMS
COUGH: 0
NAUSEA: 0
TROUBLE SWALLOWING: 0
MUSCULOSKELETAL NEGATIVE: 1
CONSTITUTIONAL NEGATIVE: 1
CHILLS: 0
PSYCHIATRIC NEGATIVE: 1
WOUND: 1
SHORTNESS OF BREATH: 1
ABDOMINAL DISTENTION: 0
WEAKNESS: 0
FATIGUE: 0
LIGHT-HEADEDNESS: 0
DIFFICULTY URINATING: 0
ABDOMINAL PAIN: 0
MYALGIAS: 0
FEVER: 0
VOMITING: 0
NEUROLOGICAL NEGATIVE: 1
CONSTIPATION: 0
DIARRHEA: 1
RECTAL PAIN: 1

## 2024-10-22 ASSESSMENT — PAIN - FUNCTIONAL ASSESSMENT
PAIN_FUNCTIONAL_ASSESSMENT: 0-10

## 2024-10-22 ASSESSMENT — PAIN DESCRIPTION - ORIENTATION: ORIENTATION: MID

## 2024-10-22 ASSESSMENT — PAIN SCALES - PAIN ASSESSMENT IN ADVANCED DEMENTIA (PAINAD): TOTALSCORE: MEDICATION (SEE MAR)

## 2024-10-22 NOTE — PROGRESS NOTES
Spiritual Care Visit    Clinical Encounter Type  Visited With: Patient  Routine Visit: Follow-up  Continue Visiting: Yes         Values/Beliefs  Spiritual Requests During Hospitalization: Kay receivd Communion today.    Sacramental Encounters  Communion: Patient wants communion  Communion Given Indicator: Yes     Stephane Felder

## 2024-10-22 NOTE — NURSING NOTE
Breakfast at bedside, no void yet.  Encouraged fluids.  Fresh ice water at bedside.  BSC near patient, encouraged to call after eating to try and urinate.

## 2024-10-22 NOTE — NURSING NOTE
Assumed care of patient.  Resting quietly in bed, awake and alert.  Respirations even and unlabored on 4L O2.  No complaints of pain/discomfort.  Mcnamara has been removed.  Awaiting first void.  Call light and commonly used items in reach.  Bed locked and in low position.

## 2024-10-22 NOTE — CONSULTS
Assessment/Plan     Inpatient consult to Acute Care Surgery  Consult performed by: JORGE L Molina-CNP  Consult ordered by: Vonda Schwab MD  Reason for consult: rectal fistula  Assessment/Recommendations: 59 year old female with Hidradenitis, chronic wounds presents with chronic rectal fistula with intermittent drainage. No lumps or hardened areas felt around the area of fistula, and there does not seem to be an abscess pocket present. No new or increased pain in that area, although she reports it does hurt when she has a bowel movement. There is no indication for a surgical intervention presently but Patient will follow up with Dr. Hernandez in the clinic after discharge for further care regarding fistula. If she requires a future surgical intervention, will need cardiology clearance since she was recently placed on Plavix and Xarelto for cardiac thrombus.     D/W Dr. Hernandez    Please contact the APPs first for any issues or questions during work hours Monday-Friday from 8am-5pm via Secure Messaging. For help after hours, please contact the Surgeon on call. In the event of an emergency, please contact the surgeon directly.          Subjective     This is a 59 year old female patient with a past medical history of hidradenitis suppurativa, diabetes, chronic rectal fistula, new onset A-fib, COPD who presented to the hospital with complaints of shortness of breath.  She was taken for a heart catheterization due to concerns for an NSTEMI on EKG.  She was found to have thrombus in the distal portion of the first diagonal branch.  She was placed on Xarelto and Plavix.  We have been asked to see this patient for her rectal fistula per patient request.  She was seen in the past by general surgery in 2020 for rectal fistula and cyst removal.  Currently she states that the fistula has some intermittent drainage.  Sometimes it is purulent, sometimes has stool.  No recent changes with pain in the area or  output.    Shortness of Breath  Pertinent negatives include no abdominal pain, chest pain, fever, rash or vomiting.       Review of Systems  Review of Systems   Constitutional: Negative.  Negative for chills, fatigue and fever.   HENT: Negative.  Negative for trouble swallowing.    Respiratory:  Positive for shortness of breath. Negative for cough.    Cardiovascular:  Negative for chest pain.   Gastrointestinal:  Positive for diarrhea and rectal pain. Negative for abdominal distention, abdominal pain, constipation, nausea and vomiting.   Genitourinary:  Negative for difficulty urinating.   Musculoskeletal: Negative.  Negative for myalgias.   Skin:  Positive for wound. Negative for rash.   Neurological: Negative.  Negative for weakness and light-headedness.   Psychiatric/Behavioral: Negative.         Objective     Vital signs for last 24 hours:  Temp:  [36 °C (96.8 °F)-36.4 °C (97.5 °F)] 36.1 °C (97 °F)  Heart Rate:  [66-70] 67  Resp:  [16-24] 18  BP: (110-139)/(33-46) 113/40  FiO2 (%):  [36 %] 36 %    Intake/Output this shift:  I/O this shift:  In: 240 [P.O.:240]  Out: 500 [Urine:500]    Physical Exam  Physical Exam  Vitals and nursing note reviewed.   Constitutional:       Appearance: She is morbidly obese.   HENT:      Head: Normocephalic and atraumatic.   Cardiovascular:      Rate and Rhythm: Normal rate.   Pulmonary:      Comments: Supplemental oxygen via nasal cannula  Abdominal:      General: Abdomen is protuberant. There is no distension.      Palpations: Abdomen is soft.   Genitourinary:     Comments: Right buttock crease with healed surgical scar  Left buttock crease with 3 open areas, no active drainage    Musculoskeletal:         General: No swelling or tenderness.   Skin:     General: Skin is warm and dry.      Comments: Open wound right buttock with possible DTI   Neurological:      Mental Status: She is alert and oriented to person, place, and time.         Labs  Lab Results   Component Value Date     WBC 12.1 (H) 10/22/2024    HGB 14.5 10/22/2024    HCT 43.5 10/22/2024    MCV 81 10/22/2024     10/22/2024     Lab Results   Component Value Date    GLUCOSE 148 (H) 10/22/2024    CALCIUM 8.5 (L) 10/22/2024     (L) 10/22/2024    K 3.6 10/22/2024    CO2 34 (H) 10/22/2024    CL 94 (L) 10/22/2024    BUN 38 (H) 10/22/2024    CREATININE 0.67 10/22/2024

## 2024-10-22 NOTE — NURSING NOTE
UPDATE 0026: Notified [facility] of Sepsis alert.   Contributing Factors    Respiration Rate: 24 (6h max) (>20) 10/21/24 5137     UPDATE 0634: Mcnamara removed for trial void

## 2024-10-22 NOTE — NURSING NOTE
Patient O2 turned down to 2L.  Cont POX in place to monitor.  Up in chair.  States washed up with therapy.  Using BSC with no difficulties  call light in reach.

## 2024-10-22 NOTE — PROGRESS NOTES
Physical Therapy    Physical Therapy Treatment    Patient Name: Kay Pike  MRN: [id number]  Department: [address]  Room: 16/16  Today's Date: 10/22/2024  Time Calculation  Start Time: 0731  Stop Time: 0754  Time Calculation (min): 23 min         Assessment/Plan   PT Assessment  End of Session Communication: Bedside nurse  End of Session Patient Position: Up in chair, Alarm on  PT Plan  Inpatient/Swing Bed or Outpatient: Inpatient  PT Plan  Treatment/Interventions: Bed mobility, Transfer training, Gait training, Strengthening, Therapeutic exercise, Therapeutic activity, Balance training, Endurance training  PT Plan: Ongoing PT  PT Frequency: 6 times per week  PT Discharge Recommendations: Moderate intensity level of continued care  Equipment Recommended upon Discharge: Wheeled walker  PT Recommended Transfer Status: Contact guard, Assistive device (FWW)  PT - OK to Discharge: Yes      General Visit Information:   PT  Visit  PT Received On: 10/22/24  General  Prior to Session Communication: Bedside nurse  Patient Position Received: Bed, 3 rail up, Alarm off, not on at start of session  General Comment: Cleared by nursing to be seen for therapy, pt agreeable with tx, supine in bed upon arrival.    Subjective   Precautions:  Precautions  Medical Precautions: Fall precautions, Oxygen therapy device and L/min (4L oxygen)    Objective   Pain:  Pain Assessment  Pain Assessment: 0-10  0-10 (Numeric) Pain Score: 0 - No pain    Cognition:  Cognition  Orientation Level: Oriented X4     Postural Control:  Static Sitting Balance  Static Sitting-Balance Support: Bilateral upper extremity supported, Feet supported  Static Sitting-Level of Assistance: Close supervision  Static Standing Balance  Static Standing-Balance Support: Bilateral upper extremity supported  Static Standing-Level of Assistance: Close supervision    Treatments:  Therapeutic Exercise  Therapeutic Exercise Performed: Yes  Therapeutic Exercise Activity 1:  Bilateral ankle pumps x15  Therapeutic Exercise Activity 2: Bilateral hip flexion x15  Therapeutic Exercise Activity 3: Bilateral knee extension x15  Therapeutic Exercise Activity 4: Resisted hip abd/add 15    Bed Mobility  Bed Mobility: Yes  Bed Mobility 1  Bed Mobility 1: Supine to sitting  Level of Assistance 1: Close supervision  Bed Mobility Comments 1: Increased time and effort to complete bed mobility.    Ambulation/Gait Training  Ambulation/Gait Training Performed: Yes  Ambulation/Gait Training 1  Surface 1: Level tile  Device 1: Rolling walker  Assistance 1: Close supervision  Quality of Gait 1: Wide base of support, Diminished heel strike, Decreased step length, Forward flexed posture  Comments/Distance (ft) 1: 40' with wheeled walker, requires cues for upright posture/cues to remain in HOMERO of walker, supervision for balance.    Transfers  Transfer: Yes  Transfer 1  Transfer From 1: Sit to  Transfer to 1: Stand  Technique 1: Sit to stand, Stand to sit  Transfer Device 1: Walker  Transfer Level of Assistance 1: Close supervision  Trials/Comments 1: Cues for proper hand placement, cues for safety during eccentric control in sitting.    Outcome Measures:  [facility] Basic Mobility  Turning from your back to your side while in a flat bed without using bedrails: A little  Moving from lying on your back to sitting on the side of a flat bed without using bedrails: A little  Moving to and from bed to chair (including a wheelchair): A little  Standing up from a chair using your arms (e.g. wheelchair or bedside chair): A little  To walk in hospital room: A little  Climbing 3-5 steps with railing: A lot  Basic Mobility - Total Score: 17      Encounter Problems       Encounter Problems (Active)       PT Problem       Pt will transition supine<>sit using hospital bed with mod I.  (Progressing)       Start:  10/14/24    Expected End:  11/10/24            Pt will transfer sit<>stand with FWW & mod I.  (Progressing)        Start:  10/14/24    Expected End:  11/10/24            Pt will ambulate >/=50 ft with FWW & mod I.  (Progressing)       Start:  10/14/24    Expected End:  11/10/24            Pt will demonstrate oxygen conservation strategies (eg, pursed lip breathing technique, no verbalization, etc) during exertive functional mobility/activities with at most 1 verbal cue (distant S). (Progressing)       Start:  10/14/24    Expected End:  11/10/24

## 2024-10-22 NOTE — PROGRESS NOTES
"Kay Pike is a 59 y.o. female on day 12 of admission presenting with Acute systolic heart failure.    Subjective   Patient sitting up in chair. Denies chest pain, pressure or palpitations.        Objective     Physical Exam  Constitutional:       Appearance: She is obese.   Cardiovascular:      Rate and Rhythm: Normal rate and regular rhythm.      Heart sounds: No murmur heard.     No friction rub. No gallop.   Pulmonary:      Breath sounds: No wheezing, rhonchi or rales.      Comments: Diminished breath sounds throughout  Abdominal:      General: Bowel sounds are normal.      Palpations: Abdomen is soft.   Musculoskeletal:      Right lower leg: Edema present.      Left lower leg: Edema present.      Comments: Evidence of chronic lymphedema to the bilateral lower extremities   Skin:     General: Skin is warm and dry.      Capillary Refill: Capillary refill takes less than 2 seconds.   Neurological:      Mental Status: She is alert and oriented to person, place, and time.   Psychiatric:         Mood and Affect: Mood normal.         Behavior: Behavior normal.         Last Recorded Vitals  Blood pressure (!) 130/36, pulse 66, temperature 36 °C (96.8 °F), temperature source Temporal, resp. rate 18, height 1.575 m (5' 2.01\"), weight 132 kg (290 lb 8 oz), SpO2 95%.  Intake/Output last 3 Shifts:    Intake/Output Summary (Last 24 hours) at 10/22/2024 0825  Last data filed at 10/22/2024 0634  Gross per 24 hour   Intake 1220 ml   Output 2225 ml   Net -1005 ml        Relevant Results  Results for orders placed or performed during the hospital encounter of 10/10/24 (from the past 24 hours)   POCT GLUCOSE   Result Value Ref Range    POCT Glucose 175 (H) 74 - 99 mg/dL   POCT GLUCOSE   Result Value Ref Range    POCT Glucose 144 (H) 74 - 99 mg/dL   CBC and Auto Differential   Result Value Ref Range    WBC 12.1 (H) 4.4 - 11.3 x10*3/uL    nRBC 0.0 0.0 - 0.0 /100 WBCs    RBC 5.38 (H) 4.00 - 5.20 x10*6/uL    Hemoglobin 14.5 12.0 - " 16.0 g/dL    Hematocrit 43.5 36.0 - 46.0 %    MCV 81 80 - 100 fL    MCH 27.0 26.0 - 34.0 pg    MCHC 33.3 32.0 - 36.0 g/dL    RDW 16.6 (H) 11.5 - 14.5 %    Platelets 192 150 - 450 x10*3/uL    Neutrophils % 79.8 40.0 - 80.0 %    Immature Granulocytes %, Automated 0.7 0.0 - 0.9 %    Lymphocytes % 11.5 13.0 - 44.0 %    Monocytes % 7.6 2.0 - 10.0 %    Eosinophils % 0.2 0.0 - 6.0 %    Basophils % 0.2 0.0 - 2.0 %    Neutrophils Absolute 9.63 (H) 1.20 - 7.70 x10*3/uL    Immature Granulocytes Absolute, Automated 0.09 0.00 - 0.70 x10*3/uL    Lymphocytes Absolute 1.39 1.20 - 4.80 x10*3/uL    Monocytes Absolute 0.92 0.10 - 1.00 x10*3/uL    Eosinophils Absolute 0.03 0.00 - 0.70 x10*3/uL    Basophils Absolute 0.02 0.00 - 0.10 x10*3/uL   Comprehensive Metabolic Panel   Result Value Ref Range    Glucose 148 (H) 74 - 99 mg/dL    Sodium 134 (L) 136 - 145 mmol/L    Potassium 3.6 3.5 - 5.3 mmol/L    Chloride 94 (L) 98 - 107 mmol/L    Bicarbonate 34 (H) 21 - 32 mmol/L    Anion Gap 10 10 - 20 mmol/L    Urea Nitrogen 38 (H) 6 - 23 mg/dL    Creatinine 0.67 0.50 - 1.05 mg/dL    eGFR >90 >60 mL/min/1.73m*2    Calcium 8.5 (L) 8.6 - 10.3 mg/dL    Albumin 3.1 (L) 3.4 - 5.0 g/dL    Alkaline Phosphatase 62 33 - 110 U/L    Total Protein 6.1 (L) 6.4 - 8.2 g/dL    AST 13 9 - 39 U/L    Bilirubin, Total 0.9 0.0 - 1.2 mg/dL    ALT 27 7 - 45 U/L   Magnesium   Result Value Ref Range    Magnesium 2.31 1.60 - 2.40 mg/dL   Phosphorus   Result Value Ref Range    Phosphorus 3.8 2.5 - 4.9 mg/dL   POCT GLUCOSE   Result Value Ref Range    POCT Glucose 117 (H) 74 - 99 mg/dL                   Assessment/Plan   Assessment & Plan  Shortness of breath    Acute systolic heart failure    COPD (chronic obstructive pulmonary disease) with acute bronchitis (Multi)    Hyperglycemia    Acute hypoxic respiratory failure (Multi)    STEMI (ST elevation myocardial infarction) (Multi)    Pneumonia due to infectious organism    Atrial fibrillation (Multi)    Acute on chronic  respiratory failure with hypercapnia (Multi)      10/14: Be consulted for rapid heart rates initially suspected to be SVT.  Review of telemetry data as well as review of EKG in my opinion reveals a rapid atrial fibrillation with heart rates in the 160s.  Patient does not have a history of atrial fibrillation.  Most recent echocardiogram shows reduced ejection fraction of 40 to 45% with no significant atrial dilatation or abnormal valvular function.  Patient is currently experiencing an exacerbation of COPD.  She did undergo cardiac catheterization which revealed:  thrombus seen on cardiac catheterization distal diagonal 1.  No indication for intervention since the lesion is very distally and the artery diameter is small overall for intervention.  Since cardiac catheterization the intensivist team focused on fluid volume management as she appeared to be fluid overloaded on admission as well exacerbation of COPD.  On assessment today her lung sounds are rhonchorous throughout.  She has 2-3 word conversational dyspnea.  She remains with lymphedema to bilateral lower extremities.  At this point would continue with oral metoprolol, however would increase this to 25 mg p.o. twice daily.  Noting reduced ejection fraction of 40% would like to try to avoid diltiazem at this time.  Will utilize digoxin 500 mcg x 1 followed by 250 mcg x 1 4 hours later.  Would plan to start the patient on 125 mcg of digoxin tomorrow morning.  Concerning anticoagulation patient's [id number] equals 4.  Anticoagulation is generally recommended.  At this point she has been placed on aspirin as well as ticagrelor after her recent cardiac catheterization with no stent placement.  Generally triple therapy is not recommended long term.  For today we will continue with her oral aspirin and Brilinta.  Will reevaluate tomorrow to assess burden of atrial fibrillation and at that point if she remains in atrial fibrillation we will likely stop aspirin and  continue with ticagrelor and apixaban.  Time my assessment she does have rhonchorous breath sounds throughout.  She continues to receive IV furosemide which I agree with.  Will follow with you.      10/15: Improved over the past 24 hours.  Patient was given IV digoxin yesterday for  first diagnosed rapid atrial fibrillation.  This was very effective and the patient spontaneously converted back to a sinus rhythm at approximately 6 PM yesterday and has remained in a sinus rhythm since that time.  Additionally her beta-blocker was increased.  At this point we will trial discontinuation of digoxin and attempt to continue with beta-blocker alone.  Concerning anticoagulation as noted above [id number] equals 4.  Anticoagulation in this patient is recommended.  She is currently on aspirin as well as ticagrelor.  Will stop oral aspirin and start the patient on rivaroxaban for stroke risk reduction.  Lung sounds have significantly improved with BiPAP overnight.  Overall improving.  Would like to follow 1 day further.  Will follow with you.     8/16: Remains in sinus rhythm.  Continue metoprolol.  Continue oral anticoagulation for stroke risk reduction.  Recent NSTEMI from first diagonal thromboembolism.  That being said coronary angiography does show some element of atherosclerotic disease.  I am switching her antiplatelet from Brilinta to Plavix.  Continue statin.  Reasonable to transfer to stepdown today.  Will follow.     8/17: 16 beat run of NSVT this morning asymptomatic. Will increase metoprolol. Continue GDMT. Getting IV lasix this AM, reasonable. Will follow     8/18: Would continue diuresis with IV Lasix, strict attention to intakes and outputs and daily weights.  Will continue to follow this patient with you.     10/19: Patient feeling good and denies any chest pain, SOB, palpitations. BP normotensive. SpO2 94% on 6L NC. Transitioned from HiFlow to NC yesterday. Labs today with sodium 134, potassium 4.6,  creatinine 0.72, magnesium 2.46, WBC 16.0, hemoglobin 15.7. -200 mL fluid balance today so far. -3L total yesterday. Continue to diuresis; ancipitate transitioning to oral tomorrow. On Xarelto for stroke risk reduction. Remains in sinus rhythm on telemetry. Continue Plavix, Farxiga, metoprolol succ, statin, losartan. We will continue to follow.      10/20: Patient reports she continues to improve. Denies any chest pain. Reports an episode of palpitations yesterday that lasted a couple of seconds. BP normotensive. SpO2 97% on 5L NC. Telemetry with sinus rhythm and occasional runs of nonsustained SVT that last 3-5 beats. Labs today with sodium 134, potassium 4.5, creatinine 0.72, WBC 13.1, hemoglobin 15.3, magnesium 2.3. -2400mL fluid balance. She continues to improve with diuresis; will hold off on transitioning to orals until tomorrow. Likely will then transition to torsemide. We will continue to follow.     10/21: As above.  Patient resting comfortably in bed.  Denies chest pain, pressure or palpitations.  Remains on nasal cannula oxygen at 4 L adequate pulse oximetries.  The patient is in sinus rhythm on telemetry with few brief runs of nonsustained supraventricular tachycardia lasting no more than 6 beats.  Lab work this morning revealed sodium 134, potassium 3.7, creatinine 0.71 and hemoglobin of 14.8.  Patient with a -2.8L fluid balance over the last 24 hours.  Patient remains on guideline directed medical therapy with metoprolol succinate, losartan and Farxiga.  Will continue furosemide 40 mg IV push every 12 hours.  I am going to repeat a chest x-ray this morning to assess for pulmonary vascular congestion.  We will continue to follow this patient with you.    10/22: Patient continues to improve.  This morning she is sitting up in her chair eating breakfast.  She denies chest pain or pressure.  She has an approximate -1 L fluid balance over the last 24 hours.  Remains on 4 L nasal cannula with adequate pulse  oximetry's.  Blood pressures normotensive overall with last recorded 130/36.  Overall believe she is reaching euvolemia.  Tentatively will transition her to oral diuretics tomorrow.  Continue Plavix and Xarelto.  Continue guideline directed medical therapy with beta-blocker, ARB and ARNI.  She is on a steroid taper per pulmonology.  Plan is to discharge patient to a skilled nursing facility.  We will continue to follow with you.      Yenny Pastrana, APRN-CNP

## 2024-10-22 NOTE — ASSESSMENT & PLAN NOTE
HbA1c 6.7%  Suspect hyperglycemia is due to steroid use  Continue SSI  Hypoglycemia protocol    History and Physical       Fabienne Velazquez  YOB: 1958    Date of Service:  2022    Chief Complaint:   Fabienne Velazquez is a 66 y.o. female who presents for complete physical examination. Chief Complaint   Patient presents with    Annual Exam     ANNUAL PHYSICAL EXAM        HPI:     Patient is currently living with her brother who has end stage esophageal cancer and had a massive heart attack, wife  of COVID 2022.  lives down the street. Trying to add an addition onto the house, has kids their helping with her  who is disabled. Pt is stressed and busy. 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 Here for med refills, chronic condition check in. Needs labs but has drawn at hospital for financial reasons. Says allergies have improved since living at her brother's house. Wt Readings from Last 3 Encounters:   22 175 lb (79.4 kg)   12/10/21 184 lb (83.5 kg)   21 175 lb (79.4 kg)     BP Readings from Last 3 Encounters:   22 134/82   12/10/21 130/82   21 126/72       Patient Active Problem List   Diagnosis    Encounter for long-term (current) use of other medications    Abnormal weight gain    Allergic rhinitis    Pain in limb    Abnormal findings on  screening    Elevated blood pressure reading without diagnosis of hypertension    Asthma    Contact with or exposure to other communicable diseases(V01.89)    Malaise and fatigue    Hypoglycemia    Pure hypercholesterolemia    Hypothyroidism       Allergies   Allergen Reactions    Latex Hives    Kenalog [Triamcinolone Acetonide] Swelling and Rash    Bactrim [Sulfamethoxazole-Trimethoprim]      Vertigo and vomitting do not prescribe     Neomycin     Penicillins      Outpatient Medications Marked as Taking for the 22 encounter (Office Visit) with RIYA Head CNP   Medication Sig Dispense Refill    levothyroxine (EUTHYROX) 75 MCG tablet TAKE 1 TABLET BY MOUTH ONCE DAILY . APPOINTMENT REQUIRED FOR FUTURE REFILLS 30 tablet 0    simvastatin (ZOCOR) 80 MG tablet TAKE 1 TABLET BY MOUTH ONCE DAILY IN THE EVENING APPOINTMENT  REQUIRED  FOR  FUTURE  REFILLS 30 tablet 0    Probiotic Product (PROBIOTIC-10 PO) Take by mouth      GARLIC OIL PO Take by mouth      Misc Natural Products (APPLE CIDER VINEGAR DIET PO) Take by mouth      vitamin D (CHOLECALCIFEROL) 1000 UNIT TABS tablet Take 1,000 Units by mouth daily      omeprazole (PRILOSEC) 20 MG capsule Take 1 capsule by mouth daily (with breakfast) 30 capsule 2    Pyridoxine HCl (B-6) 250 MG TABS Take 1 tablet by mouth daily       vitamin B-12 (CYANOCOBALAMIN) 1000 MCG tablet Take 1,000 mcg by mouth daily. aspirin 81 MG tablet Take 81 mg by mouth daily. albuterol (PROVENTIL;VENTOLIN) 90 MCG/ACT inhaler Inhale 1-2 puffs into the lungs every 4 hours as needed. Loratadine (CLARITIN PO) Take 10 mg by mouth daily. Omega-3 Fatty Acids (OMEGA 3 PO) Take 1 tablet by mouth daily. diphenhydrAMINE (BENADRYL) 25 MG capsule Take 25 mg by mouth daily.            Past Medical History:   Diagnosis Date    Allergic rhinitis     Carpal tunnel syndrome on left     Hypercholesteremia     Hyperthyroidism     Ruptured lumbar disc     Thigh pain      Past Surgical History:   Procedure Laterality Date    CARPAL TUNNEL RELEASE  1992    R    Brock Barragan     Family History   Problem Relation Age of Onset    Coronary Art Dis Mother     Diabetes Mother     Diabetes Brother     Asthma Brother     Diabetes Maternal Grandmother      Social History     Socioeconomic History    Marital status:      Spouse name: Not on file    Number of children: Not on file    Years of education: Not on file    Highest education level: Not on file   Occupational History    Not on file   Tobacco Use    Smoking status: Former     Packs/day: 0.50     Years: 38.00     Pack years: 19.00     Types: Cigarettes     Quit date: 1/1/2010     Years since quittin.6    Smokeless tobacco: Never   Substance and Sexual Activity    Alcohol use: Yes     Comment: SOCIALLY    Drug use: No     Types: Marijuana Mar Adam)    Sexual activity: Not on file   Other Topics Concern    Not on file   Social History Narrative    Not on file     Social Determinants of Health     Financial Resource Strain: Low Risk     Difficulty of Paying Living Expenses: Not hard at all   Food Insecurity: No Food Insecurity    Worried About [address] in the Last Year: Never true    [address] in the Last Year: Never true   Transportation Needs: No Transportation Needs    Lack of Transportation (Medical): No    Lack of Transportation (Non-Medical): No   Physical Activity: Not on file   Stress: Not on file   Social Connections: Not on file   Intimate Partner Violence: Not on file   Housing Stability: Not on file       Review of Systems:  Review of Systems   Constitutional:  Negative for activity change, appetite change, fatigue, fever and unexpected weight change. HENT:  Negative for congestion, ear pain, sinus pressure and sinus pain. Eyes:  Negative for discharge and visual disturbance. Respiratory:  Negative for cough, chest tightness and shortness of breath. Cardiovascular:  Negative for chest pain, palpitations and leg swelling. Gastrointestinal:  Negative for abdominal distention, abdominal pain, constipation, diarrhea and nausea. Endocrine: Negative for cold intolerance, heat intolerance, polydipsia, polyphagia and polyuria. Genitourinary:  Negative for decreased urine volume, difficulty urinating, dysuria, flank pain, frequency and urgency. Musculoskeletal:  Negative for arthralgias, back pain, gait problem, joint swelling, myalgias and neck pain. Skin:  Negative for color change, rash and wound. Allergic/Immunologic: Negative for food allergies and immunocompromised state.    Neurological:  Negative for dizziness, tremors, speech difficulty, weakness, light-headedness, numbness and headaches. Hematological:  Negative for adenopathy. Does not bruise/bleed easily. Psychiatric/Behavioral:  Negative for confusion, decreased concentration, self-injury, sleep disturbance and suicidal ideas. The patient is not nervous/anxious. Physical Exam:   Vitals:    08/19/22 1120   BP: 134/82   Site: Right Upper Arm   Position: Sitting   Cuff Size: Medium Adult   Pulse: 81   SpO2: 97%   Weight: 175 lb (79.4 kg)   Height: 5' 2\" (1.575 m)     Body mass index is 32.01 kg/m². Physical Exam  Vitals reviewed. Constitutional:       General: She is awake. Appearance: Normal appearance. She is well-developed and well-groomed. She is obese. She is not ill-appearing. HENT:      Head: Normocephalic and atraumatic. Right Ear: Hearing, tympanic membrane, ear canal and external ear normal.      Left Ear: Hearing, tympanic membrane, ear canal and external ear normal.      Nose: Nose normal.      Mouth/Throat:      Lips: Pink. Mouth: Mucous membranes are moist.      Pharynx: Oropharynx is clear. Eyes:      General: Lids are normal.      Extraocular Movements: Extraocular movements intact. Conjunctiva/sclera: Conjunctivae normal.      Pupils: Pupils are equal, round, and reactive to light. Neck:      Thyroid: No thyromegaly. Vascular: No carotid bruit. Cardiovascular:      Rate and Rhythm: Normal rate. Pulses:           Carotid pulses are 2+ on the right side and 2+ on the left side. Radial pulses are 2+ on the right side and 2+ on the left side. Posterior tibial pulses are 2+ on the right side and 2+ on the left side. Heart sounds: Normal heart sounds, S1 normal and S2 normal. No murmur heard. Pulmonary:      Effort: Pulmonary effort is normal.      Breath sounds: Normal breath sounds. Chest:   Breasts:     Right: No supraclavicular adenopathy. Left: No supraclavicular adenopathy.    Abdominal:      General: Bowel sounds are normal. There is no abdominal bruit. Palpations: Abdomen is soft. Tenderness: There is no abdominal tenderness. Genitourinary:     Comments: Deferred  Musculoskeletal:         General: Normal range of motion. Cervical back: Full passive range of motion without pain, normal range of motion and neck supple. Right lower leg: No edema. Left lower leg: No edema. Lymphadenopathy:      Head:      Right side of head: No submental, submandibular, tonsillar, preauricular, posterior auricular or occipital adenopathy. Left side of head: No submental, submandibular, tonsillar, preauricular, posterior auricular or occipital adenopathy. Cervical: No cervical adenopathy. Right cervical: No superficial, deep or posterior cervical adenopathy. Left cervical: No superficial, deep or posterior cervical adenopathy. Upper Body:      Right upper body: No supraclavicular adenopathy. Left upper body: No supraclavicular adenopathy. Skin:     General: Skin is warm and dry. Capillary Refill: Capillary refill takes less than 2 seconds. Neurological:      General: No focal deficit present. Mental Status: She is alert and oriented to person, place, and time. Mental status is at baseline. Sensory: Sensation is intact. Motor: Motor function is intact. Coordination: Coordination is intact. Gait: Gait is intact. Psychiatric:         Attention and Perception: Attention and perception normal.         Mood and Affect: Mood and affect normal.         Speech: Speech normal.         Behavior: Behavior normal. Behavior is cooperative. Thought Content:  Thought content normal.         Cognition and Memory: Cognition and memory normal.         Judgment: Judgment normal.        Preventive Care:  Health Maintenance Due   Topic Date Due    Pneumococcal 0-64 years Vaccine (1 - PCV) Never done    Hepatitis C screen  Never done    DTaP/Tdap/Td vaccine (1 - Tdap) Never done    Cervical cancer screen  Never done    Diabetes screen  Never done    Breast cancer screen  Never done    Shingles vaccine (1 of 2) Never done    Colorectal Cancer Screen  11/19/2015    COVID-19 Vaccine (3 - Booster for Pfizer series) 09/22/2021    Lipids  03/15/2022       Hx abnormal PAP: no  Sexual activity: none   Self-breast exams: yes  Last eye exam: 2021,normal   Exercise: cleaning houses, swimming, walking  Seatbelt use: Yes       Preventive plan of care for Tian Sánchez        8/19/2022           Preventive Measures Status       Recommendations for screening   Colon Cancer Screen   Last colonoscopy: NA, + fam hx colon cancer, esophageal cancer, colon polyps Recommended, but declined   Breast Cancer Screen  Last mammogram: never, discussed with pt, no fam hx breast cancer  Test recommended and ordered   Cervical Cancer Screen   Last PAP smear: long ago recommended   Osteoporosis Screen   Last DXA scan: NA This test is not clinically indicated   Diabetes Screen  Glucose (mg/dL)   Date Value   03/15/2021 109 (H)    Test recommended and ordered   Cholesterol Screen  Lab Results   Component Value Date    CHOL 193 03/15/2021    TRIG 131 03/15/2021    HDL 50 03/15/2021    LDLCALC 117 (H) 03/15/2021    Test recommended and ordered   Aspirin for Cardiovascular Prevention   Yes Continue daily aspirin   Weight: Body mass index is 32.01 kg/m².   5' 2\" (1.575 m)175 lb (79.4 kg)    Your BMI is 25 or greater, which indicates that you are overweight   Living Will: No   Additional information provided    Recommended Immunizations    Immunization History   Administered Date(s) Administered    COVID-19, PFIZER PURPLE top, DILUTE for use, (age 15 y+), 30mcg/0.3mL 04/01/2021, 04/22/2021    Influenza Virus Vaccine 10/12/2010    Influenza Whole 10/12/2010    Influenza, Intradermal, Preservative free 11/19/2014        See care gaps addressed below              Other Recommendations   Follow up in this office in 1 year(s) for further evaluation and treatment of health and labs  See an eye specialist every 1 years  See a dentist every 6 months  You should limit alcohol use to no more than 2 drinks/day (beer included) or abstain completely  Try to get at least 30 minutes of exercise 3-4 days per week  Always wear a seat belt when traveling in a car  Always wear a helmet when riding a bicycle or motorcycle  When exposed to the sun, use a sunscreen that protects against both UVA and UVB radiation with an SPF of 30 or greater- reapply every 2 to 3 hours or after sweating, drying off with a towel, or swimming  You need 1174-3744 mg of calcium and 0451-8214 IU of vitamin D per day- it is possible to meet your calcium requirement with diet alone, but a vitamin D supplement is usually necessary  Have your blood pressure checked at least once every year                 Assessment/Plan:    1. Well adult exam  2. Encounter for screening mammogram for malignant neoplasm of breast  -     ALEJO DIGITAL SCREEN W OR WO CAD BILATERAL; Future   Please call [address] [place] [address] to schedule your mammogram or [phone] for mammogram Debbie Offer   Discussed, no interest  3. Encounter for hepatitis C screening test for low risk patient  -     Hepatitis C Antibody; Future  4. Impaired fasting glucose  -     Hemoglobin A1C; Future   Elevated for years, recommend A1C check  5. Acquired hypothyroidism  -     Comprehensive Metabolic Panel; Future  -     TSH with Reflex; Future   Levothyroxine 75 mcg daily on empty stomach   Information printed and reviewed   Aware it has been almost 18 months since labs, needs labs before refill, declined today- wants at hospital for cost purposes  6. Pure hypercholesterolemia  -     Comprehensive Metabolic Panel; Future  -     Lipid Panel;  Future   The 10-year ASCVD risk score (Alex Cee, et al., 2013) is: 4.1%    Values used to calculate the score:      Age: 66 years      Sex: Female      Is Non- : No      Diabetic: No      Tobacco smoker: No      Systolic Blood Pressure: 389 mmHg      Is BP treated: No      HDL Cholesterol: 50 mg/dL      Total Cholesterol: 193 mg/dL   Low 10 year cardiac risk   Continue simvastatin   Work on limiting saturated fats in diet, and eating a healthy balance of fruits, vegetables, lean proteins, and multigrains. Physical activity 150 minutes weekly recommended    Weight loss, initial goal 10% of body weight recommended   Daily ASA 81 mg- continue  7. Vitamin D deficiency  -     Vitamin D 25 Hydroxy; Future   Vitamin 1000 IU daily   Vitamin D helps with immune support, bone health, and energy levels. Care Gaps Addressed  PNA vaccine recommended  HIV screen not needed  Hep C screen recommended with next blood draw   TDAP vaccine recommended- call insurance to discuss coverage  PAP smear recommended- declined  Diabetes screen recommended- ifg  Mammo overdue- discussed, pt aware  Call insurance company to discuss coverage for shingles vaccine (Shingrix) 2 dose series   Colon cancer screening discussed- discussed, declined referral, will see Dr Lazarus Chukyree brother's doctor after brother passes away, says she has no time right now  COVID booster recommended  Lipids overdue- ordered  PHQ UTD 2022          I have reviewed patient's pertinent medical history, relevant laboratory and imaging studies, and past/future health maintenance. Discussed with the patient the importance of adhering to their current medication regimen as directed. Advised the patient that they should continue to work on eating a healthy balanced diet and staying active by exercising within their personal limits. Orders as listed above. Patient was advised to keep future appointments with their respective specialty care team(s). Patient had the opportunity to ask questions, all of which were answered to the best of my ability and with patient satisfaction.  Patient understands and is agreeable with the care plan following today's visit. Patient is to schedule an appointment for any new or worsening symptoms. Go to ER for significant shortness of breath, chest pain, or uncontrolled pain or fever. I discussed with patient the risk and benefits of any medications that were prescribed today. I verified that the patient understands their medications, labs, and/or procedures. The patient is doing well with current medication regimen and does not have any barriers to adherence. The patient's self-management abilities are good.        Follow Up Complete Annual Physical with Fasting Labs Yearly

## 2024-10-22 NOTE — NURSING NOTE
CHF education/book given to patient. Discussed low sodium diet, importance of daily weights, following up with physician appointments, taking medications as prescribed. COPD education/book given to patient. Discussed triggers of COPD (smoking, stress/anxiety, weather, perfumes, pet dander..). Smoking cessation education/handout given to patient. Discussed cardiac/pulmonary rehab with patient. Patient declined interest in cardiac/pulmonary rehab.

## 2024-10-22 NOTE — NURSING NOTE
Patient voided in BSC.  No difficulties reported.   Sitting upright in chair.  Education provided on smoking cessation, patient receptive.

## 2024-10-22 NOTE — PROGRESS NOTES
Kay Pike is a 59 y.o. female on day 12 of admission presenting with Acute systolic heart failure.      Subjective   States she feels well. Denies any complaints currently. Had a BM this afternoon. Tolerating PO. Currently on 2L NC.        Objective     Last Recorded Vitals  BP (!) 113/40 (BP Location: Right arm, Patient Position: Sitting)   Pulse 67   Temp 36.1 °C (97 °F) (Temporal)   Resp 18   Wt 132 kg (290 lb 8 oz)   SpO2 94%   Intake/Output last 3 Shifts:    Intake/Output Summary (Last 24 hours) at 10/22/2024 1420  Last data filed at 10/22/2024 1247  Gross per 24 hour   Intake 1220 ml   Output 2725 ml   Net -1505 ml       Admission Weight  Weight: 127 kg (279 lb) (10/10/24 1903)    Daily Weight  10/22/24 : 132 kg (290 lb 8 oz)    Image Results  Cardiac Catheterization Procedure  Recommendations:      Physical Exam  Constitutional:       General: She is not in acute distress.     Appearance: She is not toxic-appearing.   HENT:      Mouth/Throat:      Pharynx: Oropharynx is clear.   Eyes:      General: No scleral icterus.  Cardiovascular:      Rate and Rhythm: Normal rate.   Pulmonary:      Effort: No respiratory distress.      Breath sounds: No wheezing.      Comments: 2L NC  Abdominal:      General: There is no distension.      Palpations: Abdomen is soft.      Tenderness: There is no abdominal tenderness.   Musculoskeletal:      Right lower leg: Edema (trace, improving) present.      Left lower leg: Edema (trace, improving) present.   Skin:     Comments: Chronic hyperkeratosis and venous stasis of the B/L lower extremities   Neurological:      Mental Status: She is alert and oriented to person, place, and time.         Relevant Results               Assessment/Plan        Assessment & Plan  Acute hypoxic respiratory failure (Multi)  Due to flash pulmonary edema and COPD exacerbation  Continue supplemental oxygen, wean as able  COPD (chronic obstructive pulmonary disease) with acute bronchitis  (Multi)  Taper steroids  Continue aerosols PRN  Will need outpatient follow up with pulmonology   Shortness of breath  2/2 above  Acute systolic heart failure  EF 40-45%  Cardiology following  Continue diuresis per cardiology  Continue GDMT  Hyperglycemia  HbA1c 6.7%  Suspect hyperglycemia is due to steroid use  Continue SSI  Hypoglycemia protocol   STEMI (ST elevation myocardial infarction) (Multi)  S/p cardiac cath 10/10 - no occlusions  Cardiology following  Continue plavix and statin  Pneumonia due to infectious organism  Presumed bacterial pneumonia  S/p zosyn x5 days but continued to have cough, now on augmentin - plan for total of 5 days  Atrial fibrillation (Multi)  Management per cardiology   Continue xarelto and metoprolol   Rectal fistula  Reports having a rectal fistula and is concerned about drainage from it  Discussed outpatient general surgery referral vs inpatient consult, patient requesting inpatient general surgery eval.     Case discussed with Yenny Pastrana NP.     Plan:  General surgery consulted for evaluation of patient's rectal fistula - epic chat message sent to Dr. Alejandre to notify her of consult  Continue diuresis per cardiology  Continue prednisone taper per pulmonology  Wean O2 as tolerated  PT/OT  Anticipate discharge to SNF pending precert               Vonda Schwab MD

## 2024-10-22 NOTE — PROGRESS NOTES
Occupational Therapy    OT Treatment    Patient Name: Kay Pike  MRN: [id number]  Department: [facility]  Room: 16/16  Today's Date: 10/22/2024  Time Calculation  Start Time: 0917  Stop Time: 0941  Time Calculation (min): 24 min        Assessment:  OT Assessment: Tolerated session well, demonstrating continued progression towards POC with improved overall functional tolerance noted. Pt would benefit from continued skilled OT services to improve strength, balance, and functional tolerance to increase independence with ADL tasks  End of Session Communication: Bedside nurse  End of Session Patient Position: Up in chair, Alarm on  OT Assessment Results: Decreased ADL status, Decreased endurance, Decreased functional mobility  Plan:  Treatment Interventions: ADL retraining, Functional transfer training, Endurance training, Patient/family training, Equipment evaluation/education, Compensatory technique education, Continued evaluation  OT Frequency: 4 times per week  OT Discharge Recommendations: Moderate intensity level of continued care  OT Recommended Transfer Status: Minimal assist, Assist of 1  OT - OK to Discharge: Yes  Treatment Interventions: ADL retraining, Functional transfer training, Endurance training, Patient/family training, Equipment evaluation/education, Compensatory technique education, Continued evaluation    Subjective   Previous Visit Info:  OT Last Visit  OT Received On: 10/22/24  General:  General  Prior to Session Communication: Bedside nurse  Patient Position Received: Up in chair (BSC)  General Comment: Cleared for therapy per RN. Pt seated on BSC with call light on, requesting assist to get cleaned up  Precautions:  Hearing/Visual Limitations: reading glasses  Medical Precautions: Fall precautions, Oxygen therapy device and L/min (4L O2 nc)    Vital Signs (Past 2hrs)        Date/Time Vitals Session Patient Position Pulse Resp SpO2 BP MAP (mmHg)    10/22/24 1159 --  --  67  --  94 %  113/40  61                    Vital Signs Comment: desat 89% during functional tasks, increased at rest     Pain:  Pain Assessment  Pain Assessment: 0-10  0-10 (Numeric) Pain Score: 0 - No pain    Objective    Cognition:  Cognition  Overall Cognitive Status: Within Functional Limits  Orientation Level: Oriented X4  Safety/Judgement: Exceptions to WFL  Insight: Mild    Activities of Daily Living:    Grooming  Grooming Level of Assistance: Setup  Grooming Where Assessed: Chair  Grooming Comments: face washing, hair brushing, and oral hygiene tasks. Assist to out don/doff hair in pony    UE Dressing  UE Dressing Level of Assistance: Minimum assistance  UE Dressing Where Assessed: Chair  UE Dressing Comments: don/doff gown    Toileting  Toileting Level of Assistance: Minimum assistance  Where Assessed: Bedside commode  Toileting Comments: pt able to initiate pericare hygiene with min A for task completion/efficiency  Functional Standing Tolerance:  Time: 4 min  Functional Standing Tolerance Comments: tolerated standing during pericare hygiene and functional transfers  Bed Mobility/Transfers:    Transfers  Transfer: Yes  Transfer 1  Transfer From 1: Commode-standard to  Transfer to 1: Chair with arms  Technique 1: Sit to stand, Stand to sit  Transfer Device 1: Walker  Transfer Level of Assistance 1: Close supervision  Trials/Comments 1: cues for proper hand placement, demonstrating decreased eccentric lowering to chair    Functional Mobility:  Functional Mobility  Functional Mobility Performed: Yes  Functional Mobility 1  Device 1: Rolling walker  Assistance 1: Contact guard  Comments 1: tolerated taking steps to chair with cues for pacing and postural alignment, demonstrating fair balance    Standing Balance:  Dynamic Standing Balance  Dynamic Standing-Level of Assistance: Contact guard, Close supervision  Dynamic Standing-Balance: Forward lean, Reaching for objects, Reaching across midline  Dynamic Standing-Comments: fair+ balance  during rear pericare hygiene    Outcome Measures:[facility] Daily Activity  Putting on and taking off regular lower body clothing: A lot  Bathing (including washing, rinsing, drying): A little  Putting on and taking off regular upper body clothing: A little  Toileting, which includes using toilet, bedpan or urinal: A little  Taking care of personal grooming such as brushing teeth: A little  Eating Meals: None  Daily Activity - Total Score: 18    Education Documentation  Body Mechanics, taught by TOMMY Flores at 10/22/2024 12:16 PM.  Learner: Patient  Readiness: Acceptance  Method: Explanation  Response: Verbalizes Understanding, Demonstrated Understanding    Home Exercise Program, taught by TOMMY Flores at 10/22/2024 12:16 PM.  Learner: Patient  Readiness: Acceptance  Method: Explanation  Response: Verbalizes Understanding, Demonstrated Understanding    ADL Training, taught by TOMMY Flores at 10/22/2024 12:16 PM.  Learner: Patient  Readiness: Acceptance  Method: Explanation  Response: Verbalizes Understanding, Demonstrated Understanding

## 2024-10-22 NOTE — ASSESSMENT & PLAN NOTE
Reports having a rectal fistula and is concerned about drainage from it  Discussed outpatient general surgery referral vs inpatient consult, patient requesting inpatient general surgery eval.

## 2024-10-23 LAB
ALBUMIN SERPL BCP-MCNC: 3.1 G/DL (ref 3.4–5)
ALP SERPL-CCNC: 59 U/L (ref 33–110)
ALT SERPL W P-5'-P-CCNC: 25 U/L (ref 7–45)
ANION GAP SERPL CALCULATED.3IONS-SCNC: 10 MMOL/L (ref 10–20)
AST SERPL W P-5'-P-CCNC: 12 U/L (ref 9–39)
BASOPHILS # BLD AUTO: 0.01 X10*3/UL (ref 0–0.1)
BASOPHILS NFR BLD AUTO: 0.1 %
BILIRUB SERPL-MCNC: 0.7 MG/DL (ref 0–1.2)
BUN SERPL-MCNC: 38 MG/DL (ref 6–23)
CALCIUM SERPL-MCNC: 8.5 MG/DL (ref 8.6–10.3)
CHLORIDE SERPL-SCNC: 96 MMOL/L (ref 98–107)
CO2 SERPL-SCNC: 32 MMOL/L (ref 21–32)
CREAT SERPL-MCNC: 0.64 MG/DL (ref 0.5–1.05)
EGFRCR SERPLBLD CKD-EPI 2021: >90 ML/MIN/1.73M*2
EOSINOPHIL # BLD AUTO: 0.03 X10*3/UL (ref 0–0.7)
EOSINOPHIL NFR BLD AUTO: 0.3 %
ERYTHROCYTE [DISTWIDTH] IN BLOOD BY AUTOMATED COUNT: 16.3 % (ref 11.5–14.5)
GLUCOSE BLD MANUAL STRIP-MCNC: 112 MG/DL (ref 74–99)
GLUCOSE BLD MANUAL STRIP-MCNC: 164 MG/DL (ref 74–99)
GLUCOSE BLD MANUAL STRIP-MCNC: 167 MG/DL (ref 74–99)
GLUCOSE BLD MANUAL STRIP-MCNC: 203 MG/DL (ref 74–99)
GLUCOSE SERPL-MCNC: 140 MG/DL (ref 74–99)
HCT VFR BLD AUTO: 42.6 % (ref 36–46)
HGB BLD-MCNC: 14 G/DL (ref 12–16)
IMM GRANULOCYTES # BLD AUTO: 0.09 X10*3/UL (ref 0–0.7)
IMM GRANULOCYTES NFR BLD AUTO: 0.8 % (ref 0–0.9)
LYMPHOCYTES # BLD AUTO: 1.46 X10*3/UL (ref 1.2–4.8)
LYMPHOCYTES NFR BLD AUTO: 12.6 %
MAGNESIUM SERPL-MCNC: 2.51 MG/DL (ref 1.6–2.4)
MCH RBC QN AUTO: 26.7 PG (ref 26–34)
MCHC RBC AUTO-ENTMCNC: 32.9 G/DL (ref 32–36)
MCV RBC AUTO: 81 FL (ref 80–100)
MONOCYTES # BLD AUTO: 0.86 X10*3/UL (ref 0.1–1)
MONOCYTES NFR BLD AUTO: 7.4 %
NEUTROPHILS # BLD AUTO: 9.1 X10*3/UL (ref 1.2–7.7)
NEUTROPHILS NFR BLD AUTO: 78.8 %
NRBC BLD-RTO: 0 /100 WBCS (ref 0–0)
PHOSPHATE SERPL-MCNC: 3.2 MG/DL (ref 2.5–4.9)
PLATELET # BLD AUTO: 193 X10*3/UL (ref 150–450)
POTASSIUM SERPL-SCNC: 3.5 MMOL/L (ref 3.5–5.3)
PROT SERPL-MCNC: 5.9 G/DL (ref 6.4–8.2)
RBC # BLD AUTO: 5.24 X10*6/UL (ref 4–5.2)
SODIUM SERPL-SCNC: 134 MMOL/L (ref 136–145)
WBC # BLD AUTO: 11.6 X10*3/UL (ref 4.4–11.3)

## 2024-10-23 PROCEDURE — 83735 ASSAY OF MAGNESIUM: CPT | Performed by: INTERNAL MEDICINE

## 2024-10-23 PROCEDURE — 99233 SBSQ HOSP IP/OBS HIGH 50: CPT | Performed by: STUDENT IN AN ORGANIZED HEALTH CARE EDUCATION/TRAINING PROGRAM

## 2024-10-23 PROCEDURE — 2500000002 HC RX 250 W HCPCS SELF ADMINISTERED DRUGS (ALT 637 FOR MEDICARE OP, ALT 636 FOR OP/ED): Performed by: INTERNAL MEDICINE

## 2024-10-23 PROCEDURE — 36415 COLL VENOUS BLD VENIPUNCTURE: CPT | Performed by: INTERNAL MEDICINE

## 2024-10-23 PROCEDURE — 2500000001 HC RX 250 WO HCPCS SELF ADMINISTERED DRUGS (ALT 637 FOR MEDICARE OP): Performed by: INTERNAL MEDICINE

## 2024-10-23 PROCEDURE — 82947 ASSAY GLUCOSE BLOOD QUANT: CPT

## 2024-10-23 PROCEDURE — 2500000001 HC RX 250 WO HCPCS SELF ADMINISTERED DRUGS (ALT 637 FOR MEDICARE OP): Performed by: STUDENT IN AN ORGANIZED HEALTH CARE EDUCATION/TRAINING PROGRAM

## 2024-10-23 PROCEDURE — 9420000001 HC RT PATIENT EDUCATION 5 MIN

## 2024-10-23 PROCEDURE — 99232 SBSQ HOSP IP/OBS MODERATE 35: CPT | Performed by: STUDENT IN AN ORGANIZED HEALTH CARE EDUCATION/TRAINING PROGRAM

## 2024-10-23 PROCEDURE — 2500000001 HC RX 250 WO HCPCS SELF ADMINISTERED DRUGS (ALT 637 FOR MEDICARE OP): Performed by: NURSE PRACTITIONER

## 2024-10-23 PROCEDURE — 94640 AIRWAY INHALATION TREATMENT: CPT

## 2024-10-23 PROCEDURE — 97116 GAIT TRAINING THERAPY: CPT | Mod: GP,CQ

## 2024-10-23 PROCEDURE — 84100 ASSAY OF PHOSPHORUS: CPT | Performed by: INTERNAL MEDICINE

## 2024-10-23 PROCEDURE — 99232 SBSQ HOSP IP/OBS MODERATE 35: CPT | Performed by: NURSE PRACTITIONER

## 2024-10-23 PROCEDURE — 2500000002 HC RX 250 W HCPCS SELF ADMINISTERED DRUGS (ALT 637 FOR MEDICARE OP, ALT 636 FOR OP/ED): Performed by: STUDENT IN AN ORGANIZED HEALTH CARE EDUCATION/TRAINING PROGRAM

## 2024-10-23 PROCEDURE — 85025 COMPLETE CBC W/AUTO DIFF WBC: CPT | Performed by: INTERNAL MEDICINE

## 2024-10-23 PROCEDURE — 94660 CPAP INITIATION&MGMT: CPT

## 2024-10-23 PROCEDURE — 2060000001 HC INTERMEDIATE ICU ROOM DAILY

## 2024-10-23 PROCEDURE — 2500000005 HC RX 250 GENERAL PHARMACY W/O HCPCS: Performed by: INTERNAL MEDICINE

## 2024-10-23 PROCEDURE — 80053 COMPREHEN METABOLIC PANEL: CPT | Performed by: INTERNAL MEDICINE

## 2024-10-23 PROCEDURE — 2500000004 HC RX 250 GENERAL PHARMACY W/ HCPCS (ALT 636 FOR OP/ED): Performed by: STUDENT IN AN ORGANIZED HEALTH CARE EDUCATION/TRAINING PROGRAM

## 2024-10-23 RX ORDER — FLUTICASONE FUROATE AND VILANTEROL 200; 25 UG/1; UG/1
1 POWDER RESPIRATORY (INHALATION)
Status: DISCONTINUED | OUTPATIENT
Start: 2024-10-24 | End: 2024-10-24 | Stop reason: HOSPADM

## 2024-10-23 RX ORDER — FUROSEMIDE 40 MG/1
40 TABLET ORAL
Status: DISCONTINUED | OUTPATIENT
Start: 2024-10-23 | End: 2024-10-24 | Stop reason: HOSPADM

## 2024-10-23 RX ADMIN — FORMOTEROL FUMARATE DIHYDRATE 20 MCG: 20 SOLUTION RESPIRATORY (INHALATION) at 07:38

## 2024-10-23 RX ADMIN — METOPROLOL SUCCINATE 50 MG: 50 TABLET, EXTENDED RELEASE ORAL at 08:43

## 2024-10-23 RX ADMIN — LOSARTAN POTASSIUM 50 MG: 50 TABLET, FILM COATED ORAL at 08:43

## 2024-10-23 RX ADMIN — METOPROLOL SUCCINATE 50 MG: 50 TABLET, EXTENDED RELEASE ORAL at 21:03

## 2024-10-23 RX ADMIN — BUDESONIDE INHALATION 0.5 MG: 0.5 SUSPENSION RESPIRATORY (INHALATION) at 07:38

## 2024-10-23 RX ADMIN — LEVOTHYROXINE SODIUM 100 MCG: 0.1 TABLET ORAL at 06:38

## 2024-10-23 RX ADMIN — INSULIN LISPRO 3 UNITS: 100 INJECTION, SOLUTION INTRAVENOUS; SUBCUTANEOUS at 21:53

## 2024-10-23 RX ADMIN — PANTOPRAZOLE SODIUM 40 MG: 40 TABLET, DELAYED RELEASE ORAL at 06:38

## 2024-10-23 RX ADMIN — CLOPIDOGREL BISULFATE 75 MG: 75 TABLET ORAL at 08:43

## 2024-10-23 RX ADMIN — AMOXICILLIN AND CLAVULANATE POTASSIUM 1 TABLET: 875; 125 TABLET, FILM COATED ORAL at 08:43

## 2024-10-23 RX ADMIN — ESCITALOPRAM OXALATE 10 MG: 10 TABLET ORAL at 08:43

## 2024-10-23 RX ADMIN — INSULIN LISPRO 3 UNITS: 100 INJECTION, SOLUTION INTRAVENOUS; SUBCUTANEOUS at 16:14

## 2024-10-23 RX ADMIN — Medication 2 L/MIN: at 07:40

## 2024-10-23 RX ADMIN — DAPAGLIFLOZIN 10 MG: 10 TABLET, FILM COATED ORAL at 08:43

## 2024-10-23 RX ADMIN — SIMVASTATIN 40 MG: 40 TABLET, FILM COATED ORAL at 21:03

## 2024-10-23 RX ADMIN — AMOXICILLIN AND CLAVULANATE POTASSIUM 1 TABLET: 875; 125 TABLET, FILM COATED ORAL at 21:03

## 2024-10-23 RX ADMIN — INSULIN LISPRO 3 UNITS: 100 INJECTION, SOLUTION INTRAVENOUS; SUBCUTANEOUS at 11:30

## 2024-10-23 RX ADMIN — BUDESONIDE INHALATION 0.5 MG: 0.5 SUSPENSION RESPIRATORY (INHALATION) at 19:41

## 2024-10-23 RX ADMIN — FUROSEMIDE 40 MG: 40 TABLET ORAL at 16:14

## 2024-10-23 RX ADMIN — NYSTATIN 1 APPLICATION: 100000 POWDER TOPICAL at 08:47

## 2024-10-23 RX ADMIN — PREDNISONE 20 MG: 20 TABLET ORAL at 08:43

## 2024-10-23 RX ADMIN — RIVAROXABAN 20 MG: 20 TABLET, FILM COATED ORAL at 16:14

## 2024-10-23 RX ADMIN — FORMOTEROL FUMARATE DIHYDRATE 20 MCG: 20 SOLUTION RESPIRATORY (INHALATION) at 19:44

## 2024-10-23 ASSESSMENT — COGNITIVE AND FUNCTIONAL STATUS - GENERAL
STANDING UP FROM CHAIR USING ARMS: A LITTLE
DRESSING REGULAR LOWER BODY CLOTHING: A LITTLE
MOBILITY SCORE: 16
WALKING IN HOSPITAL ROOM: A LITTLE
CLIMB 3 TO 5 STEPS WITH RAILING: TOTAL
DRESSING REGULAR LOWER BODY CLOTHING: A LITTLE
PERSONAL GROOMING: A LITTLE
CLIMB 3 TO 5 STEPS WITH RAILING: A LOT
HELP NEEDED FOR BATHING: A LITTLE
MOBILITY SCORE: 17
MOVING FROM LYING ON BACK TO SITTING ON SIDE OF FLAT BED WITH BEDRAILS: A LITTLE
STANDING UP FROM CHAIR USING ARMS: A LITTLE
HELP NEEDED FOR BATHING: A LITTLE
MOVING TO AND FROM BED TO CHAIR: A LITTLE
TURNING FROM BACK TO SIDE WHILE IN FLAT BAD: A LITTLE
MOBILITY SCORE: 16
DAILY ACTIVITIY SCORE: 19
TURNING FROM BACK TO SIDE WHILE IN FLAT BAD: A LITTLE
DRESSING REGULAR UPPER BODY CLOTHING: A LITTLE
MOVING TO AND FROM BED TO CHAIR: A LITTLE
MOVING FROM LYING ON BACK TO SITTING ON SIDE OF FLAT BED WITH BEDRAILS: A LITTLE
TOILETING: A LITTLE
PERSONAL GROOMING: A LITTLE
MOVING FROM LYING ON BACK TO SITTING ON SIDE OF FLAT BED WITH BEDRAILS: A LITTLE
DAILY ACTIVITIY SCORE: 19
MOVING TO AND FROM BED TO CHAIR: A LITTLE
CLIMB 3 TO 5 STEPS WITH RAILING: TOTAL
STANDING UP FROM CHAIR USING ARMS: A LITTLE
WALKING IN HOSPITAL ROOM: A LITTLE
WALKING IN HOSPITAL ROOM: A LITTLE
TURNING FROM BACK TO SIDE WHILE IN FLAT BAD: A LITTLE
TOILETING: A LITTLE
DRESSING REGULAR UPPER BODY CLOTHING: A LITTLE

## 2024-10-23 ASSESSMENT — PAIN - FUNCTIONAL ASSESSMENT: PAIN_FUNCTIONAL_ASSESSMENT: 0-10

## 2024-10-23 ASSESSMENT — PAIN SCALES - GENERAL
PAINLEVEL_OUTOF10: 0 - NO PAIN

## 2024-10-23 NOTE — PROGRESS NOTES
"Kay Pike is a 59 y.o. female on day 13 of admission presenting with Acute systolic heart failure.    Subjective   Alert and oriented x 3.  Denies complaints chest pain or pressure palpitations or feeling rapid heart rate, states her respiratory status is improved.       Objective     Physical Exam  Vitals and nursing note reviewed.   Constitutional:       General: She is not in acute distress.     Appearance: She is obese. She is not ill-appearing or toxic-appearing.   HENT:      Head: Normocephalic and atraumatic.      Nose: Nose normal.      Mouth/Throat:      Mouth: Mucous membranes are moist.      Pharynx: Oropharynx is clear.   Cardiovascular:      Rate and Rhythm: Normal rate and regular rhythm.   Pulmonary:      Effort: Pulmonary effort is normal.      Breath sounds: Normal breath sounds.   Abdominal:      General: Bowel sounds are normal.      Palpations: Abdomen is soft.   Musculoskeletal:         General: Normal range of motion.      Cervical back: Normal range of motion.      Right lower leg: Edema present.      Left lower leg: Edema present.      Comments: Chronic lymphedema to bilateral lower extremities.  With appearance of chronic cellulitis   Skin:     General: Skin is warm and dry.      Capillary Refill: Capillary refill takes less than 2 seconds.   Neurological:      Mental Status: She is alert and oriented to person, place, and time. Mental status is at baseline.   Psychiatric:         Mood and Affect: Mood normal.         Behavior: Behavior normal.         Thought Content: Thought content normal.         Judgment: Judgment normal.         Last Recorded Vitals  Blood pressure (!) 110/39, pulse 63, temperature 36.2 °C (97.2 °F), temperature source Temporal, resp. rate 25, height 1.575 m (5' 2.01\"), weight 131 kg (289 lb 12.8 oz), SpO2 94%.  Intake/Output last 3 Shifts:  I/O last 3 completed shifts:  In: 1220 (9.3 mL/kg) [P.O.:1220]  Out: 2775 (21.1 mL/kg) [Urine:2775 (0.6 mL/kg/hr)]  Weight: " 131.5 kg     Relevant Results  Results for orders placed or performed during the hospital encounter of 10/10/24 (from the past 24 hours)   POCT GLUCOSE   Result Value Ref Range    POCT Glucose 117 (H) 74 - 99 mg/dL   POCT GLUCOSE   Result Value Ref Range    POCT Glucose 166 (H) 74 - 99 mg/dL   POCT GLUCOSE   Result Value Ref Range    POCT Glucose 173 (H) 74 - 99 mg/dL   POCT GLUCOSE   Result Value Ref Range    POCT Glucose 208 (H) 74 - 99 mg/dL   CBC and Auto Differential   Result Value Ref Range    WBC 11.6 (H) 4.4 - 11.3 x10*3/uL    nRBC 0.0 0.0 - 0.0 /100 WBCs    RBC 5.24 (H) 4.00 - 5.20 x10*6/uL    Hemoglobin 14.0 12.0 - 16.0 g/dL    Hematocrit 42.6 36.0 - 46.0 %    MCV 81 80 - 100 fL    MCH 26.7 26.0 - 34.0 pg    MCHC 32.9 32.0 - 36.0 g/dL    RDW 16.3 (H) 11.5 - 14.5 %    Platelets 193 150 - 450 x10*3/uL    Neutrophils % 78.8 40.0 - 80.0 %    Immature Granulocytes %, Automated 0.8 0.0 - 0.9 %    Lymphocytes % 12.6 13.0 - 44.0 %    Monocytes % 7.4 2.0 - 10.0 %    Eosinophils % 0.3 0.0 - 6.0 %    Basophils % 0.1 0.0 - 2.0 %    Neutrophils Absolute 9.10 (H) 1.20 - 7.70 x10*3/uL    Immature Granulocytes Absolute, Automated 0.09 0.00 - 0.70 x10*3/uL    Lymphocytes Absolute 1.46 1.20 - 4.80 x10*3/uL    Monocytes Absolute 0.86 0.10 - 1.00 x10*3/uL    Eosinophils Absolute 0.03 0.00 - 0.70 x10*3/uL    Basophils Absolute 0.01 0.00 - 0.10 x10*3/uL   Comprehensive Metabolic Panel   Result Value Ref Range    Glucose 140 (H) 74 - 99 mg/dL    Sodium 134 (L) 136 - 145 mmol/L    Potassium 3.5 3.5 - 5.3 mmol/L    Chloride 96 (L) 98 - 107 mmol/L    Bicarbonate 32 21 - 32 mmol/L    Anion Gap 10 10 - 20 mmol/L    Urea Nitrogen 38 (H) 6 - 23 mg/dL    Creatinine 0.64 0.50 - 1.05 mg/dL    eGFR >90 >60 mL/min/1.73m*2    Calcium 8.5 (L) 8.6 - 10.3 mg/dL    Albumin 3.1 (L) 3.4 - 5.0 g/dL    Alkaline Phosphatase 59 33 - 110 U/L    Total Protein 5.9 (L) 6.4 - 8.2 g/dL    AST 12 9 - 39 U/L    Bilirubin, Total 0.7 0.0 - 1.2 mg/dL    ALT 25 7  - 45 U/L   Magnesium   Result Value Ref Range    Magnesium 2.51 (H) 1.60 - 2.40 mg/dL   Phosphorus   Result Value Ref Range    Phosphorus 3.2 2.5 - 4.9 mg/dL           Assessment/Plan   Assessment & Plan  Shortness of breath    Acute systolic heart failure    COPD (chronic obstructive pulmonary disease) with acute bronchitis (Multi)    Hyperglycemia    Acute hypoxic respiratory failure (Multi)    STEMI (ST elevation myocardial infarction) (Multi)    Pneumonia due to infectious organism    Atrial fibrillation (Multi)    Acute on chronic respiratory failure with hypercapnia (Multi)    Rectal fistula      10/14: Be consulted for rapid heart rates initially suspected to be SVT.  Review of telemetry data as well as review of EKG in my opinion reveals a rapid atrial fibrillation with heart rates in the 160s.  Patient does not have a history of atrial fibrillation.  Most recent echocardiogram shows reduced ejection fraction of 40 to 45% with no significant atrial dilatation or abnormal valvular function.  Patient is currently experiencing an exacerbation of COPD.  She did undergo cardiac catheterization which revealed:  thrombus seen on cardiac catheterization distal diagonal 1.  No indication for intervention since the lesion is very distally and the artery diameter is small overall for intervention.  Since cardiac catheterization the intensivist team focused on fluid volume management as she appeared to be fluid overloaded on admission as well exacerbation of COPD.  On assessment today her lung sounds are rhonchorous throughout.  She has 2-3 word conversational dyspnea.  She remains with lymphedema to bilateral lower extremities.  At this point would continue with oral metoprolol, however would increase this to 25 mg p.o. twice daily.  Noting reduced ejection fraction of 40% would like to try to avoid diltiazem at this time.  Will utilize digoxin 500 mcg x 1 followed by 250 mcg x 1 4 hours later.  Would plan to start the  patient on 125 mcg of digoxin tomorrow morning.  Concerning anticoagulation patient's [id number] equals 4.  Anticoagulation is generally recommended.  At this point she has been placed on aspirin as well as ticagrelor after her recent cardiac catheterization with no stent placement.  Generally triple therapy is not recommended long term.  For today we will continue with her oral aspirin and Brilinta.  Will reevaluate tomorrow to assess burden of atrial fibrillation and at that point if she remains in atrial fibrillation we will likely stop aspirin and continue with ticagrelor and apixaban.  Time my assessment she does have rhonchorous breath sounds throughout.  She continues to receive IV furosemide which I agree with.  Will follow with you.      10/15: Improved over the past 24 hours.  Patient was given IV digoxin yesterday for  first diagnosed rapid atrial fibrillation.  This was very effective and the patient spontaneously converted back to a sinus rhythm at approximately 6 PM yesterday and has remained in a sinus rhythm since that time.  Additionally her beta-blocker was increased.  At this point we will trial discontinuation of digoxin and attempt to continue with beta-blocker alone.  Concerning anticoagulation as noted above [id number] equals 4.  Anticoagulation in this patient is recommended.  She is currently on aspirin as well as ticagrelor.  Will stop oral aspirin and start the patient on rivaroxaban for stroke risk reduction.  Lung sounds have significantly improved with BiPAP overnight.  Overall improving.  Would like to follow 1 day further.  Will follow with you.     8/16: Remains in sinus rhythm.  Continue metoprolol.  Continue oral anticoagulation for stroke risk reduction.  Recent NSTEMI from first diagonal thromboembolism.  That being said coronary angiography does show some element of atherosclerotic disease.  I am switching her antiplatelet from Brilinta to Plavix.  Continue statin.   Reasonable to transfer to stepdown today.  Will follow.     8/17: 16 beat run of NSVT this morning asymptomatic. Will increase metoprolol. Continue GDMT. Getting IV lasix this AM, reasonable. Will follow     8/18: Would continue diuresis with IV Lasix, strict attention to intakes and outputs and daily weights.  Will continue to follow this patient with you.     10/19: Patient feeling good and denies any chest pain, SOB, palpitations. BP normotensive. SpO2 94% on 6L NC. Transitioned from HiFlow to NC yesterday. Labs today with sodium 134, potassium 4.6, creatinine 0.72, magnesium 2.46, WBC 16.0, hemoglobin 15.7. -200 mL fluid balance today so far. -3L total yesterday. Continue to diuresis; ancipitate transitioning to oral tomorrow. On Xarelto for stroke risk reduction. Remains in sinus rhythm on telemetry. Continue Plavix, Farxiga, metoprolol succ, statin, losartan. We will continue to follow.      10/20: Patient reports she continues to improve. Denies any chest pain. Reports an episode of palpitations yesterday that lasted a couple of seconds. BP normotensive. SpO2 97% on 5L NC. Telemetry with sinus rhythm and occasional runs of nonsustained SVT that last 3-5 beats. Labs today with sodium 134, potassium 4.5, creatinine 0.72, WBC 13.1, hemoglobin 15.3, magnesium 2.3. -2400mL fluid balance. She continues to improve with diuresis; will hold off on transitioning to orals until tomorrow. Likely will then transition to torsemide. We will continue to follow.     10/21: As above.  Patient resting comfortably in bed.  Denies chest pain, pressure or palpitations.  Remains on nasal cannula oxygen at 4 L adequate pulse oximetries.  The patient is in sinus rhythm on telemetry with few brief runs of nonsustained supraventricular tachycardia lasting no more than 6 beats.  Lab work this morning revealed sodium 134, potassium 3.7, creatinine 0.71 and hemoglobin of 14.8.  Patient with a -2.8L fluid balance over the last 24 hours.   Patient remains on guideline directed medical therapy with metoprolol succinate, losartan and Farxiga.  Will continue furosemide 40 mg IV push every 12 hours.  I am going to repeat a chest x-ray this morning to assess for pulmonary vascular congestion.  We will continue to follow this patient with you.     10/22: Patient continues to improve.  This morning she is sitting up in her chair eating breakfast.  She denies chest pain or pressure.  She has an approximate -1 L fluid balance over the last 24 hours.  Remains on 4 L nasal cannula with adequate pulse oximetry's.  Blood pressures normotensive overall with last recorded 130/36.  Overall believe she is reaching euvolemia.  Tentatively will transition her to oral diuretics tomorrow.  Continue Plavix and Xarelto.  Continue guideline directed medical therapy with beta-blocker, ARB and ARNI.  She is on a steroid taper per pulmonology.  Plan is to discharge patient to a skilled nursing facility.  We will continue to follow with you.    10/23: No significant changes over the past 24 hours.  Denies complaints chest pain or pressure palpitations or feeling rapid heart rate, negative approximately 600 mL over the past 24 hours.  She continues to nasal cannula oxygen at 2 L.  Her blood pressure stable with most recent 110/39.  Current pulse ox 94%.  Creatinine stable at 0.6.  Hemoglobin 14.0.  On telemetry monitor patient remains in a sinus rhythm with rare PACs.  No significant ectopy otherwise.  Overall the patient is stabilized from a cardiac perspective.  For discharge she will continue on a combination of clopidogrel for thrombus in her coronary artery as well as rivaroxaban for her paroxysmal atrial fibrillation postcardiac catheterization.  Overall believe the patient should have an outpatient cardiac monitor for 2 weeks to determine her burden of atrial fibrillation to determine if it is possible to eventually discontinue the rivaroxaban.  The patient has had no  further paroxysms of atrial fibrillation since her initial postcardiac catheterization.  Overall the patient is doing quite well.  She will discharge to rehabilitation today.  She will follow-up with Dr. You in the office in the next 2 to 3 weeks for reassessment.      I spent 35 minutes in the professional and overall care of this patient.      Maikol Soriano, APRN-CNP

## 2024-10-23 NOTE — PROGRESS NOTES
Physical Therapy    Updated Goals    Patient Name: Kay Pike  MRN: [id number]  Department: [facility]  Room: 16/16  Today's Date: 10/23/2024     Encounter Problems       Encounter Problems (Active)       PT Problem       Pt will transition supine<>sit using hospital bed with mod I.  (Progressing)       Start:  10/14/24    Expected End:  11/10/24            Pt will transfer sit<>stand with FWW & mod I.  (Progressing)       Start:  10/14/24    Expected End:  11/10/24            Pt will ambulate >/=50 ft with FWW & mod I.  (Progressing)       Start:  10/14/24    Expected End:  11/10/24            Pt will demonstrate oxygen conservation strategies (eg, pursed lip breathing technique, no verbalization, etc) during exertive functional mobility/activities with at most 1 verbal cue (distant S). (Progressing)       Start:  10/14/24    Expected End:  11/10/24               PT Problem       Pt will negotiate >/=3 bilateral rails with mod I. (Progressing)       Start:  10/23/24    Expected End:  11/10/24

## 2024-10-23 NOTE — NURSING NOTE
UPDATE 2028: Notified CNP Blank of SEPSIS alert Select Sepsis Screen Outcome to confirm, rule out, or assess if further monitoring is needed.     Document all criteria that applies for the patient.     No clinical risk factors of infection -> Sepsis not suspected.     Risk factors of infection + no organ dysfunction -> Sepsis Watcher (Yellow)      If a patient has risk factors for infection + 1 or more symptoms of organ dysfunction, activate Sepsis ALERT (Red), call Rapid Response, order lactate per protocol, goal for antibiotics and fluid bolus in 60 minutes        Contributing Factors    Temp: 96.6 F (6h min) (<96.8) 10/22/24 1949           Has active antibiotic with chosen indication: Pneumonia  [employer] Score: High

## 2024-10-23 NOTE — CARE PLAN
Problem: Respiratory  Goal: Clear secretions with interventions this shift  Outcome: Progressing  Goal: Minimize anxiety/maximize coping throughout shift  Outcome: Progressing  Goal: No signs of respiratory distress (eg. Use of accessory muscles. Peds grunting)  Outcome: Progressing  Goal: Verbalize decreased shortness of breath this shift  Outcome: Progressing  Goal: Wean oxygen to maintain O2 saturation per order/standard this shift  Outcome: Progressing  Goal: Increase self care and/or family involvement in next 24 hours  Outcome: Progressing

## 2024-10-23 NOTE — ASSESSMENT & PLAN NOTE
Continue nocturnal BiPap - ok for a break off of it tonight unless she desaturates on her 2L - then she should wear it. She has been having trouble sleeping with it on.   Pt will need sleep study as an outpatient  May be able to qualify for bipap based on COPD and chronic CO2 retention (hypoventilation). Will need AM ABG after a night without bipap when closer to discharge.   ABG in am to assess hypercarbia/respiratory acidosis

## 2024-10-23 NOTE — PROGRESS NOTES
"Kay Pike is a 59 y.o. female on day 13 of admission presenting with Acute systolic heart failure.    Subjective   Down to 2L NC  Continuing to diurese  Breathing feels much better  Has never had a sleep study  Does not have a known diagnosis of VLADIMIR  Is having trouble sleeping with BiPap on       Objective     GENERAL APPEARANCE: Healthy appearing, in no acute distress  SKIN: no rashes  HEAD, EYES, EARS, NOSE, THROAT: Without sinus tenderness. Conjunctiva and sclera clear. T Oropharynx unremarkable  NECK: No lymphadenopathy  CHEST: AP Diameter normal. No retractions. Auscultation reveals good air exchange without crackles or wheeze. Dry cough  HEART: Normal rhythm, without murmur  ABDOMEN: Not distended. Normal bowel sounds. Soft and non-tender to palpation, without hepatomegaly or splenomegaly. No masses  EXTREMITIES: Without cyanosis. No clubbing. Still has significant LE edema with skin changes due to chronic venous stasis and chronic edema  LYMPHATIC: No lymphadenopathy  MUSCULOSKELETAL: Unremarkable   NEUROLOGIC: Grossly intact      Last Recorded Vitals  Blood pressure (!) 130/43, pulse 63, temperature 36.5 °C (97.7 °F), temperature source Temporal, resp. rate 25, height 1.575 m (5' 2.01\"), weight 131 kg (289 lb 12.8 oz), SpO2 97%.  Intake/Output last 3 Shifts:  I/O last 3 completed shifts:  In: 600 (4.6 mL/kg) [P.O.:600]  Out: 1200 (9.1 mL/kg) [Urine:1200 (0.3 mL/kg/hr)]  Weight: 131.5 kg     Relevant Results  Results for orders placed or performed during the hospital encounter of 10/10/24 (from the past 24 hours)   POCT GLUCOSE   Result Value Ref Range    POCT Glucose 208 (H) 74 - 99 mg/dL   CBC and Auto Differential   Result Value Ref Range    WBC 11.6 (H) 4.4 - 11.3 x10*3/uL    nRBC 0.0 0.0 - 0.0 /100 WBCs    RBC 5.24 (H) 4.00 - 5.20 x10*6/uL    Hemoglobin 14.0 12.0 - 16.0 g/dL    Hematocrit 42.6 36.0 - 46.0 %    MCV 81 80 - 100 fL    MCH 26.7 26.0 - 34.0 pg    MCHC 32.9 32.0 - 36.0 g/dL    RDW 16.3 " (H) 11.5 - 14.5 %    Platelets 193 150 - 450 x10*3/uL    Neutrophils % 78.8 40.0 - 80.0 %    Immature Granulocytes %, Automated 0.8 0.0 - 0.9 %    Lymphocytes % 12.6 13.0 - 44.0 %    Monocytes % 7.4 2.0 - 10.0 %    Eosinophils % 0.3 0.0 - 6.0 %    Basophils % 0.1 0.0 - 2.0 %    Neutrophils Absolute 9.10 (H) 1.20 - 7.70 x10*3/uL    Immature Granulocytes Absolute, Automated 0.09 0.00 - 0.70 x10*3/uL    Lymphocytes Absolute 1.46 1.20 - 4.80 x10*3/uL    Monocytes Absolute 0.86 0.10 - 1.00 x10*3/uL    Eosinophils Absolute 0.03 0.00 - 0.70 x10*3/uL    Basophils Absolute 0.01 0.00 - 0.10 x10*3/uL   Comprehensive Metabolic Panel   Result Value Ref Range    Glucose 140 (H) 74 - 99 mg/dL    Sodium 134 (L) 136 - 145 mmol/L    Potassium 3.5 3.5 - 5.3 mmol/L    Chloride 96 (L) 98 - 107 mmol/L    Bicarbonate 32 21 - 32 mmol/L    Anion Gap 10 10 - 20 mmol/L    Urea Nitrogen 38 (H) 6 - 23 mg/dL    Creatinine 0.64 0.50 - 1.05 mg/dL    eGFR >90 >60 mL/min/1.73m*2    Calcium 8.5 (L) 8.6 - 10.3 mg/dL    Albumin 3.1 (L) 3.4 - 5.0 g/dL    Alkaline Phosphatase 59 33 - 110 U/L    Total Protein 5.9 (L) 6.4 - 8.2 g/dL    AST 12 9 - 39 U/L    Bilirubin, Total 0.7 0.0 - 1.2 mg/dL    ALT 25 7 - 45 U/L   Magnesium   Result Value Ref Range    Magnesium 2.51 (H) 1.60 - 2.40 mg/dL   Phosphorus   Result Value Ref Range    Phosphorus 3.2 2.5 - 4.9 mg/dL   POCT GLUCOSE   Result Value Ref Range    POCT Glucose 112 (H) 74 - 99 mg/dL   POCT GLUCOSE   Result Value Ref Range    POCT Glucose 203 (H) 74 - 99 mg/dL   POCT GLUCOSE   Result Value Ref Range    POCT Glucose 167 (H) 74 - 99 mg/dL           Assessment/Plan   Assessment & Plan  Acute systolic heart failure  Continuing to diurese  Cardiology managing  COPD (chronic obstructive pulmonary disease) with acute bronchitis (Multi)  Continue ICS, LABA, LAMA - changed from nebulizer to inhaler  Albuterol nebulizer PRN  Prednisone taper ordered (20mg BID for two days, 20 daily to 2 days, 10 daily for 2 days,  then stop).  Acute hypoxic respiratory failure (Multi)  Continue to wean O2 as able  Incentive spirometer ordered    Pneumonia due to infectious organism  Still has productive cough - continue augmentin for another 7 days  Acute on chronic respiratory failure with hypercapnia (Multi)  Continue nocturnal BiPap - ok for a break off of it tonight unless she desaturates on her 2L - then she should wear it. She has been having trouble sleeping with it on.   Pt will need sleep study as an outpatient  May be able to qualify for bipap based on COPD and chronic CO2 retention (hypoventilation). Will need AM ABG after a night without bipap when closer to discharge.   ABG in am to assess hypercarbia/respiratory acidosis    Pt requested help with medication schedule as well as help with figuring out medication copays when she is closer to discharge. If available a pharmacy education consult may be beneficial.        I spent 35 minutes in the professional and overall care of this patient.      Yenny Garsia MD PhD

## 2024-10-23 NOTE — PROGRESS NOTES
Physical Therapy    Physical Therapy Treatment    Patient Name: Kay Pike  MRN: [id number]  Department: [address]  Room: 16/16  Today's Date: 10/23/2024  Time Calculation  Start Time: 1145  Stop Time: 1212  Time Calculation (min): 27 min         Assessment/Plan   PT Assessment  End of Session Communication: Bedside nurse  Assessment Comment: Pt progressing steadily towards stated goals. Trialed stairs this date with good tolerance. No c/o SOB. sp02 WNL throughout. Pt would benefit from continued skilled PT services for maximizing independence and safety prior to & after discharge  End of Session Patient Position: Up in chair, Alarm on  PT Plan  Inpatient/Swing Bed or Outpatient: Inpatient  PT Plan  Treatment/Interventions: Bed mobility, Transfer training, Gait training, Strengthening, Therapeutic exercise, Therapeutic activity, Balance training, Endurance training  PT Plan: Ongoing PT  PT Frequency: 6 times per week  PT Discharge Recommendations: Moderate intensity level of continued care  Equipment Recommended upon Discharge: Wheeled walker  PT Recommended Transfer Status: Contact guard, Assistive device (FWW)  PT - OK to Discharge: Yes      General Visit Information:   PT  Visit  PT Received On: 10/23/24  General  Prior to Session Communication: Bedside nurse  Patient Position Received: Up in chair, Alarm on  General Comment: Pt cleared for PT by nursing. Pt agreeable to PT services.    Subjective   Precautions:  Precautions  Medical Precautions: Fall precautions, Oxygen therapy device and L/min (2L 02 NC)  Precautions Comment: + telemetry, + continuous pulse-ox      Objective   Pain:  Pain Assessment  Pain Assessment: 0-10  0-10 (Numeric) Pain Score: 0 - No pain  Cognition:  Cognition  Overall Cognitive Status: Within Functional Limits     Postural Control:  Static Sitting Balance  Static Sitting-Balance Support: Bilateral upper extremity supported, Feet supported  Static Sitting-Level of Assistance: Distant  supervision  Static Sitting-Comment/Number of Minutes: good seated static balance  Static Standing Balance  Static Standing-Balance Support: Bilateral upper extremity supported  Static Standing-Level of Assistance: Contact guard  Static Standing-Comment/Number of Minutes: good static stand balance    Treatments:  Therapeutic Activity  Therapeutic Activity Performed: Yes  Therapeutic Activity 1: Reviewed stair set up at home and sequencing with good carryover.    Ambulation/Gait Training  Ambulation/Gait Training Performed: Yes  Ambulation/Gait Training 1  Surface 1: Level tile  Device 1: No device  Assistance 1: Hand held assistance  Quality of Gait 1: Wide base of support, Diminished heel strike, Decreased step length, Forward flexed posture  Comments/Distance (ft) 1: 15 feet x2. HHA as pt in double room   that is difficult to navigate with FWW. Pt able to negotiate turns and small spaces with no LOB.  Transfers  Transfer: Yes  Transfer 1  Transfer From 1: Stand to, Sit to  Transfer to 1: Sit, Stand  Technique 1: Sit to stand, Stand to sit  Transfer Level of Assistance 1: Contact guard  Trials/Comments 1: Cues for ant scooting and fwd weight shift to rise.    Outcome Measures:  [facility] Basic Mobility  Turning from your back to your side while in a flat bed without using bedrails: A little  Moving from lying on your back to sitting on the side of a flat bed without using bedrails: A little  Moving to and from bed to chair (including a wheelchair): A little  Standing up from a chair using your arms (e.g. wheelchair or bedside chair): A little  To walk in hospital room: A little  Climbing 3-5 steps with railing: A lot  Basic Mobility - Total Score: 17      Encounter Problems       Encounter Problems (Active)       PT Problem       Pt will transition supine<>sit using hospital bed with mod I.  (Progressing)       Start:  10/14/24    Expected End:  11/10/24            Pt will transfer sit<>stand with FWW & mod I.   (Progressing)       Start:  10/14/24    Expected End:  11/10/24            Pt will ambulate >/=50 ft with FWW & mod I.  (Progressing)       Start:  10/14/24    Expected End:  11/10/24            Pt will demonstrate oxygen conservation strategies (eg, pursed lip breathing technique, no verbalization, etc) during exertive functional mobility/activities with at most 1 verbal cue (distant S). (Progressing)       Start:  10/14/24    Expected End:  11/10/24

## 2024-10-23 NOTE — PROGRESS NOTES
10/23/24 1234   Discharge Planning   Expected Discharge Disposition SNF  ([facility])     Peer to peer offered. [phone].  Pending auth # [id number].  Dr. Schwab messaged.  No deadline for peer to peer to be completed was provided by the facility.     UPDATE: 1700  Per Dr. Schwab, patient has been approved for rehab. Patient is ready for discharge.  Several attempts have been made in [facility] and via phone to contact [facility] admissions to confirm they are able to accept patient today. [facility] has not confirmed as of this time.     PLEASE NOTIFY CARE COORDIANATION PRIOR TO DISCHARGE

## 2024-10-23 NOTE — CARE PLAN
"The patient's goals for the shift include \"to leave the ICU\"    The clinical goals for the shift include wean O2      Problem: Safety - Adult  Goal: Free from fall injury  Outcome: Progressing     Problem: Discharge Planning  Goal: Discharge to home or other facility with appropriate resources  Outcome: Progressing     Problem: Chronic Conditions and Co-morbidities  Goal: Patient's chronic conditions and co-morbidity symptoms are monitored and maintained or improved  Outcome: Progressing     Problem: Skin  Goal: Decreased wound size/increased tissue granulation at next dressing change  Outcome: Progressing  Flowsheets (Taken 10/22/2024 2314)  Decreased wound size/increased tissue granulation at next dressing change: Promote sleep for wound healing  Goal: Participates in plan/prevention/treatment measures  Outcome: Progressing  Flowsheets (Taken 10/22/2024 2314)  Participates in plan/prevention/treatment measures: Elevate heels  Goal: Prevent/manage excess moisture  Outcome: Progressing  Flowsheets (Taken 10/22/2024 2314)  Prevent/manage excess moisture: Follow provider orders for dressing changes  Goal: Prevent/minimize sheer/friction injuries  Outcome: Progressing  Flowsheets (Taken 10/22/2024 2314)  Prevent/minimize sheer/friction injuries: Increase activity/out of bed for meals  Goal: Promote/optimize nutrition  Outcome: Progressing  Flowsheets (Taken 10/22/2024 2314)  Promote/optimize nutrition:   Consume > 50% meals/supplements   Monitor/record intake including meals  Goal: Promote skin healing  Outcome: Progressing  Flowsheets (Taken 10/22/2024 2314)  Promote skin healing:   Assess skin/pad under line(s)/device(s)   Protective dressings over bony prominences     Problem: Respiratory  Goal: Clear secretions with interventions this shift  Outcome: Progressing  Goal: Minimize anxiety/maximize coping throughout shift  Outcome: Progressing  Goal: Minimal/no exertional discomfort or dyspnea this shift  Outcome: " Progressing  Goal: No signs of respiratory distress (eg. Use of accessory muscles. Peds grunting)  Outcome: Progressing  Goal: Patent airway maintained this shift  Outcome: Progressing  Goal: Tolerate pulmonary toileting this shift  Outcome: Progressing  Goal: Verbalize decreased shortness of breath this shift  Outcome: Progressing  Goal: Wean oxygen to maintain O2 saturation per order/standard this shift  Outcome: Progressing  Goal: Increase self care and/or family involvement in next 24 hours  Outcome: Progressing

## 2024-10-23 NOTE — NURSING NOTE
STEMI education/handout given to patient. Discussed importance of taking medication as prescribed/following up with physician appointments. Discussed importance of maintaining a healthy weight/benefits of a heart healthy diet. Patient declined interest in cardiac rehab.

## 2024-10-23 NOTE — PROGRESS NOTES
Physical Therapy    Physical Therapy Treatment    Patient Name: Kay Pike  MRN: [id number]  Department: [address]  Room: 16/16  Today's Date: 10/23/2024  Time Calculation  Start Time: 1145  Stop Time: 1212  Time Calculation (min): 27 min         Assessment/Plan   PT Assessment  End of Session Communication: Bedside nurse  Assessment Comment: Pt progressing steadily towards stated goals. Trialed stairs this date with good tolerance. No c/o SOB. sp02 WNL throughout. Pt would benefit from continued skilled PT services for maximizing independence and safety prior to & after discharge  End of Session Patient Position: Up in chair, Alarm on  PT Plan  Inpatient/Swing Bed or Outpatient: Inpatient  PT Plan  Treatment/Interventions: Bed mobility, Transfer training, Gait training, Strengthening, Therapeutic exercise, Therapeutic activity, Balance training, Endurance training  PT Plan: Ongoing PT  PT Frequency: 6 times per week  PT Discharge Recommendations: Moderate intensity level of continued care  Equipment Recommended upon Discharge: Wheeled walker  PT Recommended Transfer Status: Contact guard, Assistive device (FWW)  PT - OK to Discharge: Yes      General Visit Information:   PT  Visit  PT Received On: 10/23/24  General  Prior to Session Communication: Bedside nurse  Patient Position Received: Up in chair, Alarm on  General Comment: Pt cleared for PT by nursing. Pt agreeable to PT services.    Subjective   Precautions:  Precautions  Medical Precautions: Fall precautions, Oxygen therapy device and L/min (2L 02 NC)  Precautions Comment: + telemetry, + continuous pulse-ox            Objective   Pain:  Pain Assessment  Pain Assessment: 0-10  0-10 (Numeric) Pain Score: 0 - No pain  Cognition:  Cognition  Overall Cognitive Status: Within Functional Limits     Postural Control:  Static Sitting Balance  Static Sitting-Balance Support: Bilateral upper extremity supported, Feet supported  Static Sitting-Level of Assistance:  Distant supervision  Static Sitting-Comment/Number of Minutes: good seated static balance  Static Standing Balance  Static Standing-Balance Support: Bilateral upper extremity supported  Static Standing-Level of Assistance: Contact guard  Static Standing-Comment/Number of Minutes: good static stand balance    Treatments:  Therapeutic Exercise  Therapeutic Exercise Performed: No    Therapeutic Activity  Therapeutic Activity Performed: Yes  Therapeutic Activity 1: Reviewed stair set up at home and sequencing with good carryover.    Ambulation/Gait Training  Ambulation/Gait Training Performed: Yes  Ambulation/Gait Training 1  Surface 1: Level tile  Device 1: No device  Assistance 1: Hand held assistance  Quality of Gait 1: Wide base of support, Diminished heel strike, Decreased step length, Forward flexed posture  Comments/Distance (ft) 1: 15 feet x2. HHA as pt in double room   that is difficult to navigate with FWW. Pt able to negotiate turns and small spaces with no LOB.  Transfers  Transfer: Yes  Transfer 1  Transfer From 1: Stand to, Sit to  Transfer to 1: Sit, Stand  Technique 1: Sit to stand, Stand to sit  Transfer Level of Assistance 1: Contact guard  Trials/Comments 1: Cues for ant scooting and fwd weight shift to rise.    Stairs  Stairs: Yes  Stairs  Rails 1: Bilateral  Curb Step 1: No  Device 1: Railing  Assistance 1: Contact guard  Comment/Number of Steps 1: 4 stairs up/down.Step thru pattern ascending, step to pattern descending. Good control noted throughout. CGA for safety.    Outcome Measures:  [facility] Basic Mobility  Turning from your back to your side while in a flat bed without using bedrails: A little  Moving from lying on your back to sitting on the side of a flat bed without using bedrails: A little  Moving to and from bed to chair (including a wheelchair): A little  Standing up from a chair using your arms (e.g. wheelchair or bedside chair): A little  To walk in hospital room: A little  Climbing 3-5  steps with railing: A lot  Basic Mobility - Total Score: 17    Encounter Problems       Encounter Problems (Active)       PT Problem       Pt will transition supine<>sit using hospital bed with mod I.  (Progressing)       Start:  10/14/24    Expected End:  11/10/24            Pt will transfer sit<>stand with FWW & mod I.  (Progressing)       Start:  10/14/24    Expected End:  11/10/24            Pt will ambulate >/=50 ft with FWW & mod I.  (Progressing)       Start:  10/14/24    Expected End:  11/10/24            Pt will demonstrate oxygen conservation strategies (eg, pursed lip breathing technique, no verbalization, etc) during exertive functional mobility/activities with at most 1 verbal cue (distant S). (Progressing)       Start:  10/14/24    Expected End:  11/10/24

## 2024-10-23 NOTE — PROGRESS NOTES
Kay Pike is a 59 y.o. female on day 13 of admission presenting with Acute systolic heart failure.      Subjective   States she feels well today. Denies any complaints currently. On 2L. Tolerating PO.        Objective     Last Recorded Vitals  BP 99/51 (BP Location: Right arm, Patient Position: Sitting)   Pulse 64   Temp 36 °C (96.8 °F) (Temporal)   Resp 25   Wt 131 kg (289 lb 12.8 oz)   SpO2 96%   Intake/Output last 3 Shifts:    Intake/Output Summary (Last 24 hours) at 10/23/2024 1428  Last data filed at 10/23/2024 0642  Gross per 24 hour   Intake 360 ml   Output 700 ml   Net -340 ml       Admission Weight  Weight: 127 kg (279 lb) (10/10/24 1903)    Daily Weight  10/23/24 : 131 kg (289 lb 12.8 oz)    Image Results  Cardiac [place] [address]             Phone [phone]    Cardiovascular Catheterization Report    Patient Name:      KAY PIKE       Performing Physician:  [id number] René Kohli MD  Study Date:        10/10/2024           Verifying Physician:   [id number] René Kohli MD  MRN/PID:           [id number]             Cardiologist/Co-Scrub:  Accession#:        [id number]         Ordering Provider:     KEITH INVASIVE                                                                 CARDIOLOGIST                                                                 CUPID  Date of Birth/Age: 1965 / 59 years Cardiologist:  Gender:            F                    Fellow:  Encounter#:        [id number]           Surgeon:       Study: Left Heart Cath       Indications:  KAY PIKE is a 59 year old female who presents with dyslipidemia, hypertension, obesity and a chest pain assessment of typical angina. Acute coronary syndrome - STEMI. Patient with history of obesity COPD admitted to the  hospital with respiratory failure chest pressure. In the ER was found to have lateral ST elevation myocardial infarction. Brought in for emergent cardiac catheterization.  Cardiac arrest: No.       Procedure Description:  After infiltration with 2% Lidocaine, the right radial artery was cannulated with a modified Seldinger technique. Subsequently a 6 Slovenian sheath was placed in the right radial artery. Selective coronary catheterization was performed using a 5 Fr catheter(s) exchanged over a guide wire to cannulate the coronary arteries.  Additional catheter(s) used to visualize the coronary arteries were: TIG 4.     Coronary Angiography:  The coronary circulation is right dominant.     Left Main Coronary Artery:  The left main appears angiographically normal.     Left Anterior Descending Coronary Artery Distribution:  The left anterior descending artery is patent. ZAK-3 flow. There is large diagonal 1 that has a distal thrombus with ZAK I flow not amenable for intervention. The lesion is very distal no indication for intervention.     Circumflex Coronary Artery Distribution:  Left circumflex is nondominant vessel has no significant coronary disease.     Right Coronary Artery Distribution:    Right coronary artery is right dominant vessel without significant coronary disease. Patent PDA and PL branches. ZAK-3 flow.       Left Ventriculography:  The left ventricular end-diastolic pressure is elevated at 25 mmHg. LV gram showed mild LV systolic dysfunction ejection fraction of 40 to 45%. No gradient across aortic valve.     Cardiac Cath Post Procedure Notes:  Post Procedure Diagnosis: Single vessel disease.  Blood Loss:               Estimated blood loss during the procedure was 10cc                            mls.  Specimens Removed:        Number of specimen(s) removed: none.    ____________________________________________________________________________________  CONCLUSIONS:   1. Lateral STEMI secondary to  distal diagonal 1 thrombus. No indication for intervention. The lesion is very distal for an intervention.   2. Medical management. Recommend dual antiplatelet therapy for 12 months.    ICD 10 Codes:  ST elevation (STEMI) myocardial infarction involving other sites-I21.29     CPT Codes:  Left Heart Cath (visualization of coronaries) and LV-41466; Moderate Sedation Services initial 15 minutes patient >5 years-51238     [id number] René Kohli MD  Performing Physician  Electronically signed by [id number] René Kohli MD on 10/23/2024 at 8:57:54 AM         ** Final **      Physical Exam  Constitutional:       General: She is not in acute distress.     Appearance: She is not toxic-appearing.   HENT:      Head: Normocephalic.      Mouth/Throat:      Pharynx: Oropharynx is clear.   Eyes:      General: No scleral icterus.  Cardiovascular:      Rate and Rhythm: Normal rate.   Pulmonary:      Effort: No respiratory distress.      Breath sounds: No wheezing.   Abdominal:      General: There is no distension.      Palpations: Abdomen is soft.   Musculoskeletal:      Right lower leg: No edema.      Left lower leg: No edema.   Skin:     Comments: Hyperkeratosis and chronic venous stasis of B/L lower extremities   Neurological:      Mental Status: She is alert and oriented to person, place, and time.         Relevant Results               Assessment/Plan          Assessment & Plan  Acute hypoxic respiratory failure (Multi)  Due to flash pulmonary edema and COPD exacerbation  Continue supplemental oxygen, wean as able  COPD (chronic obstructive pulmonary disease) with acute bronchitis (Multi)  Taper steroids  Continue aerosols PRN  Will need outpatient follow up with pulmonology   Shortness of breath  2/2 above  Acute systolic heart failure  EF 40-45%  Cardiology following  Continue diuresis per cardiology  Continue GDMT  Hyperglycemia  HbA1c 6.7%  Suspect hyperglycemia is due to steroid use  Continue SSI  Hypoglycemia protocol   STEMI (ST  elevation myocardial infarction) (Multi)  S/p cardiac cath 10/10 - no occlusions  Cardiology following  Continue plavix and statin  Pneumonia due to infectious organism  Presumed bacterial pneumonia  S/p zosyn x5 days but continued to have cough, now on augmentin - plan for total of 5 days  Atrial fibrillation (Multi)  Management per cardiology   Continue xarelto and metoprolol   Rectal fistula  Reports having a rectal fistula and is concerned about drainage from it  Discussed outpatient general surgery referral vs inpatient consult, patient requesting inpatient general surgery eval.     Plan:  Doing well, cleared for discharge by cardiology   Wean O2 as able  Per cardiology, will need outpatient cardiac monitor  Will attempt peer to peer this afternoon  Anticipate discharge to SNF vs home pending peer to peer    Addendum 1400 - peer to peer approved. Medically cleared for discharge to SNF pending bed availability at [facility]               Vnoda Schwab MD

## 2024-10-24 VITALS
OXYGEN SATURATION: 97 % | DIASTOLIC BLOOD PRESSURE: 42 MMHG | TEMPERATURE: 97.5 F | HEART RATE: 59 BPM | HEIGHT: 62 IN | WEIGHT: 293 LBS | RESPIRATION RATE: 20 BRPM | BODY MASS INDEX: 53.92 KG/M2 | SYSTOLIC BLOOD PRESSURE: 109 MMHG

## 2024-10-24 PROBLEM — G47.33 OBSTRUCTIVE SLEEP APNEA SYNDROME: Status: ACTIVE | Noted: 2024-10-24

## 2024-10-24 LAB
ALBUMIN SERPL BCP-MCNC: 3 G/DL (ref 3.4–5)
ALP SERPL-CCNC: 57 U/L (ref 33–110)
ALT SERPL W P-5'-P-CCNC: 29 U/L (ref 7–45)
ANION GAP BLDA CALCULATED.4IONS-SCNC: 5 MMO/L (ref 10–25)
ANION GAP SERPL CALCULATED.3IONS-SCNC: 7 MMOL/L (ref 10–20)
AST SERPL W P-5'-P-CCNC: 14 U/L (ref 9–39)
BASE EXCESS BLDA CALC-SCNC: 6.5 MMOL/L (ref -2–3)
BASOPHILS # BLD AUTO: 0.03 X10*3/UL (ref 0–0.1)
BASOPHILS NFR BLD AUTO: 0.2 %
BILIRUB SERPL-MCNC: 0.6 MG/DL (ref 0–1.2)
BODY TEMPERATURE: 37 DEGREES CELSIUS
BUN SERPL-MCNC: 36 MG/DL (ref 6–23)
CA-I BLDA-SCNC: 1.2 MMOL/L (ref 1.1–1.33)
CALCIUM SERPL-MCNC: 8.3 MG/DL (ref 8.6–10.3)
CHLORIDE BLDA-SCNC: 95 MMOL/L (ref 98–107)
CHLORIDE SERPL-SCNC: 97 MMOL/L (ref 98–107)
CO2 SERPL-SCNC: 32 MMOL/L (ref 21–32)
CREAT SERPL-MCNC: 0.77 MG/DL (ref 0.5–1.05)
EGFRCR SERPLBLD CKD-EPI 2021: 90 ML/MIN/1.73M*2
EOSINOPHIL # BLD AUTO: 0.14 X10*3/UL (ref 0–0.7)
EOSINOPHIL NFR BLD AUTO: 1.1 %
ERYTHROCYTE [DISTWIDTH] IN BLOOD BY AUTOMATED COUNT: 16.4 % (ref 11.5–14.5)
GLUCOSE BLD MANUAL STRIP-MCNC: 160 MG/DL (ref 74–99)
GLUCOSE BLD MANUAL STRIP-MCNC: 91 MG/DL (ref 74–99)
GLUCOSE BLDA-MCNC: 101 MG/DL (ref 74–99)
GLUCOSE SERPL-MCNC: 93 MG/DL (ref 74–99)
HCO3 BLDA-SCNC: 33.3 MMOL/L (ref 22–26)
HCT VFR BLD AUTO: 41.6 % (ref 36–46)
HCT VFR BLD EST: 44 % (ref 36–46)
HGB BLD-MCNC: 13.7 G/DL (ref 12–16)
HGB BLDA-MCNC: 14.6 G/DL (ref 12–16)
IMM GRANULOCYTES # BLD AUTO: 0.09 X10*3/UL (ref 0–0.7)
IMM GRANULOCYTES NFR BLD AUTO: 0.7 % (ref 0–0.9)
INHALED O2 CONCENTRATION: 28 %
LACTATE BLDA-SCNC: 1 MMOL/L (ref 0.4–2)
LYMPHOCYTES # BLD AUTO: 2.69 X10*3/UL (ref 1.2–4.8)
LYMPHOCYTES NFR BLD AUTO: 21.1 %
MAGNESIUM SERPL-MCNC: 2.54 MG/DL (ref 1.6–2.4)
MCH RBC QN AUTO: 26.9 PG (ref 26–34)
MCHC RBC AUTO-ENTMCNC: 32.9 G/DL (ref 32–36)
MCV RBC AUTO: 82 FL (ref 80–100)
MONOCYTES # BLD AUTO: 1.31 X10*3/UL (ref 0.1–1)
MONOCYTES NFR BLD AUTO: 10.3 %
NEUTROPHILS # BLD AUTO: 8.51 X10*3/UL (ref 1.2–7.7)
NEUTROPHILS NFR BLD AUTO: 66.6 %
NRBC BLD-RTO: 0 /100 WBCS (ref 0–0)
OXYHGB MFR BLDA: 90 % (ref 94–98)
PCO2 BLDA: 55 MM HG (ref 38–42)
PH BLDA: 7.39 PH (ref 7.38–7.42)
PHOSPHATE SERPL-MCNC: 3.3 MG/DL (ref 2.5–4.9)
PLATELET # BLD AUTO: 197 X10*3/UL (ref 150–450)
PO2 BLDA: 64 MM HG (ref 85–95)
POTASSIUM BLDA-SCNC: 3.3 MMOL/L (ref 3.5–5.3)
POTASSIUM SERPL-SCNC: 3.1 MMOL/L (ref 3.5–5.3)
PROT SERPL-MCNC: 5.8 G/DL (ref 6.4–8.2)
RBC # BLD AUTO: 5.1 X10*6/UL (ref 4–5.2)
SAO2 % BLDA: 92 % (ref 94–100)
SODIUM BLDA-SCNC: 130 MMOL/L (ref 136–145)
SODIUM SERPL-SCNC: 133 MMOL/L (ref 136–145)
WBC # BLD AUTO: 12.8 X10*3/UL (ref 4.4–11.3)

## 2024-10-24 PROCEDURE — 2500000002 HC RX 250 W HCPCS SELF ADMINISTERED DRUGS (ALT 637 FOR MEDICARE OP, ALT 636 FOR OP/ED): Performed by: INTERNAL MEDICINE

## 2024-10-24 PROCEDURE — 99239 HOSP IP/OBS DSCHRG MGMT >30: CPT | Performed by: STUDENT IN AN ORGANIZED HEALTH CARE EDUCATION/TRAINING PROGRAM

## 2024-10-24 PROCEDURE — 36415 COLL VENOUS BLD VENIPUNCTURE: CPT | Performed by: INTERNAL MEDICINE

## 2024-10-24 PROCEDURE — 36600 WITHDRAWAL OF ARTERIAL BLOOD: CPT

## 2024-10-24 PROCEDURE — 85025 COMPLETE CBC W/AUTO DIFF WBC: CPT | Performed by: INTERNAL MEDICINE

## 2024-10-24 PROCEDURE — 94660 CPAP INITIATION&MGMT: CPT

## 2024-10-24 PROCEDURE — 2500000001 HC RX 250 WO HCPCS SELF ADMINISTERED DRUGS (ALT 637 FOR MEDICARE OP): Performed by: STUDENT IN AN ORGANIZED HEALTH CARE EDUCATION/TRAINING PROGRAM

## 2024-10-24 PROCEDURE — 84100 ASSAY OF PHOSPHORUS: CPT | Performed by: INTERNAL MEDICINE

## 2024-10-24 PROCEDURE — 97530 THERAPEUTIC ACTIVITIES: CPT | Mod: GO,CO

## 2024-10-24 PROCEDURE — 97110 THERAPEUTIC EXERCISES: CPT | Mod: GO,CO

## 2024-10-24 PROCEDURE — 2500000001 HC RX 250 WO HCPCS SELF ADMINISTERED DRUGS (ALT 637 FOR MEDICARE OP): Performed by: INTERNAL MEDICINE

## 2024-10-24 PROCEDURE — 84132 ASSAY OF SERUM POTASSIUM: CPT | Performed by: INTERNAL MEDICINE

## 2024-10-24 PROCEDURE — 82947 ASSAY GLUCOSE BLOOD QUANT: CPT

## 2024-10-24 PROCEDURE — 83735 ASSAY OF MAGNESIUM: CPT | Performed by: INTERNAL MEDICINE

## 2024-10-24 PROCEDURE — 94640 AIRWAY INHALATION TREATMENT: CPT

## 2024-10-24 PROCEDURE — 82435 ASSAY OF BLOOD CHLORIDE: CPT | Performed by: STUDENT IN AN ORGANIZED HEALTH CARE EDUCATION/TRAINING PROGRAM

## 2024-10-24 PROCEDURE — 2500000004 HC RX 250 GENERAL PHARMACY W/ HCPCS (ALT 636 FOR OP/ED): Performed by: STUDENT IN AN ORGANIZED HEALTH CARE EDUCATION/TRAINING PROGRAM

## 2024-10-24 PROCEDURE — 9420000001 HC RT PATIENT EDUCATION 5 MIN

## 2024-10-24 PROCEDURE — 2500000001 HC RX 250 WO HCPCS SELF ADMINISTERED DRUGS (ALT 637 FOR MEDICARE OP): Performed by: NURSE PRACTITIONER

## 2024-10-24 RX ORDER — AMOXICILLIN AND CLAVULANATE POTASSIUM 875; 125 MG/1; MG/1
1 TABLET, FILM COATED ORAL EVERY 12 HOURS SCHEDULED
Start: 2024-10-24 | End: 2024-10-28 | Stop reason: SDUPTHER

## 2024-10-24 RX ORDER — POTASSIUM CHLORIDE 750 MG/1
20 CAPSULE, EXTENDED RELEASE ORAL DAILY
Start: 2024-10-24

## 2024-10-24 RX ORDER — NITROGLYCERIN 0.4 MG/1
0.4 TABLET SUBLINGUAL EVERY 5 MIN PRN
Start: 2024-10-24

## 2024-10-24 RX ORDER — HYDROXYZINE HYDROCHLORIDE 25 MG/1
25 TABLET, FILM COATED ORAL EVERY 6 HOURS PRN
Start: 2024-10-24 | End: 2024-10-28 | Stop reason: SDUPTHER

## 2024-10-24 RX ORDER — DAPAGLIFLOZIN 10 MG/1
10 TABLET, FILM COATED ORAL DAILY
Start: 2024-10-25 | End: 2024-10-28 | Stop reason: SDUPTHER

## 2024-10-24 RX ORDER — POTASSIUM CHLORIDE 20 MEQ/1
40 TABLET, EXTENDED RELEASE ORAL ONCE
Status: DISCONTINUED | OUTPATIENT
Start: 2024-10-24 | End: 2024-10-24 | Stop reason: HOSPADM

## 2024-10-24 RX ORDER — FLUTICASONE FUROATE AND VILANTEROL 200; 25 UG/1; UG/1
1 POWDER RESPIRATORY (INHALATION)
Start: 2024-10-25

## 2024-10-24 RX ORDER — NYSTATIN 100000 [USP'U]/G
1 POWDER TOPICAL 2 TIMES DAILY
Start: 2024-10-24

## 2024-10-24 RX ORDER — PANTOPRAZOLE SODIUM 40 MG/1
40 TABLET, DELAYED RELEASE ORAL
Start: 2024-10-25

## 2024-10-24 RX ORDER — METOPROLOL SUCCINATE 50 MG/1
50 TABLET, EXTENDED RELEASE ORAL 2 TIMES DAILY
Start: 2024-10-24 | End: 2024-10-28 | Stop reason: SDUPTHER

## 2024-10-24 RX ORDER — PREDNISONE 10 MG/1
10 TABLET ORAL DAILY
Start: 2024-10-25 | End: 2024-10-27

## 2024-10-24 RX ORDER — CLOPIDOGREL BISULFATE 75 MG/1
75 TABLET ORAL DAILY
Start: 2024-10-25 | End: 2024-10-28 | Stop reason: SDUPTHER

## 2024-10-24 RX ADMIN — NYSTATIN 1 APPLICATION: 100000 POWDER TOPICAL at 09:28

## 2024-10-24 RX ADMIN — INSULIN LISPRO 3 UNITS: 100 INJECTION, SOLUTION INTRAVENOUS; SUBCUTANEOUS at 12:22

## 2024-10-24 RX ADMIN — LOSARTAN POTASSIUM 50 MG: 50 TABLET, FILM COATED ORAL at 09:24

## 2024-10-24 RX ADMIN — CLOPIDOGREL BISULFATE 75 MG: 75 TABLET ORAL at 09:25

## 2024-10-24 RX ADMIN — PANTOPRAZOLE SODIUM 40 MG: 40 TABLET, DELAYED RELEASE ORAL at 04:58

## 2024-10-24 RX ADMIN — ESCITALOPRAM OXALATE 10 MG: 10 TABLET ORAL at 09:24

## 2024-10-24 RX ADMIN — FUROSEMIDE 40 MG: 40 TABLET ORAL at 09:24

## 2024-10-24 RX ADMIN — LEVOTHYROXINE SODIUM 100 MCG: 0.1 TABLET ORAL at 04:58

## 2024-10-24 RX ADMIN — AMOXICILLIN AND CLAVULANATE POTASSIUM 1 TABLET: 875; 125 TABLET, FILM COATED ORAL at 09:24

## 2024-10-24 RX ADMIN — ACETAMINOPHEN 325MG 650 MG: 325 TABLET ORAL at 00:55

## 2024-10-24 RX ADMIN — METOPROLOL SUCCINATE 50 MG: 50 TABLET, EXTENDED RELEASE ORAL at 09:24

## 2024-10-24 RX ADMIN — DAPAGLIFLOZIN 10 MG: 10 TABLET, FILM COATED ORAL at 09:24

## 2024-10-24 RX ADMIN — TIOTROPIUM BROMIDE INHALATION SPRAY 2 PUFF: 3.12 SPRAY, METERED RESPIRATORY (INHALATION) at 07:30

## 2024-10-24 RX ADMIN — FLUTICASONE FUROATE AND VILANTEROL TRIFENATATE 1 PUFF: 200; 25 POWDER RESPIRATORY (INHALATION) at 07:30

## 2024-10-24 RX ADMIN — PREDNISONE 20 MG: 20 TABLET ORAL at 09:24

## 2024-10-24 ASSESSMENT — COGNITIVE AND FUNCTIONAL STATUS - GENERAL
PERSONAL GROOMING: A LITTLE
TOILETING: A LITTLE
MOVING FROM LYING ON BACK TO SITTING ON SIDE OF FLAT BED WITH BEDRAILS: A LITTLE
MOVING TO AND FROM BED TO CHAIR: A LITTLE
DAILY ACTIVITIY SCORE: 19
DRESSING REGULAR UPPER BODY CLOTHING: A LITTLE
STANDING UP FROM CHAIR USING ARMS: A LITTLE
DRESSING REGULAR LOWER BODY CLOTHING: A LITTLE
HELP NEEDED FOR BATHING: A LITTLE
TOILETING: A LITTLE
PERSONAL GROOMING: A LITTLE
MOBILITY SCORE: 16
DRESSING REGULAR UPPER BODY CLOTHING: A LITTLE
TURNING FROM BACK TO SIDE WHILE IN FLAT BAD: A LITTLE
DAILY ACTIVITIY SCORE: 18
DRESSING REGULAR LOWER BODY CLOTHING: A LOT
HELP NEEDED FOR BATHING: A LITTLE
CLIMB 3 TO 5 STEPS WITH RAILING: TOTAL
WALKING IN HOSPITAL ROOM: A LITTLE

## 2024-10-24 ASSESSMENT — PAIN SCALES - GENERAL
PAINLEVEL_OUTOF10: 0 - NO PAIN
PAINLEVEL_OUTOF10: 0 - NO PAIN
PAINLEVEL_OUTOF10: 4
PAINLEVEL_OUTOF10: 0 - NO PAIN

## 2024-10-24 ASSESSMENT — PAIN - FUNCTIONAL ASSESSMENT
PAIN_FUNCTIONAL_ASSESSMENT: 0-10

## 2024-10-24 ASSESSMENT — PAIN DESCRIPTION - DESCRIPTORS
DESCRIPTORS: ACHING
DESCRIPTORS: ACHING

## 2024-10-24 ASSESSMENT — PAIN SCALES - WONG BAKER: WONGBAKER_NUMERICALRESPONSE: NO HURT

## 2024-10-24 ASSESSMENT — PAIN DESCRIPTION - LOCATION: LOCATION: COCCYX

## 2024-10-24 NOTE — DISCHARGE SUMMARY
Discharge Diagnosis  Acute systolic heart failure    Issues Requiring Follow-Up  COPD, acute hypoxic respiratory failure - referral placed for pulmonology  Sleep apnea - needs outpatient sleep study - referral placed for sleep medicine  STEMI, CHF - follow up with cardiology   HS, rectal fistula - follow up with general surgery     Discharge Meds     Medication List      START taking these medications     amoxicillin-pot clavulanate 875-125 mg tablet; Commonly known as:   Augmentin; Take 1 tablet by mouth every 12 hours for 12 doses.   clopidogrel 75 mg tablet; Commonly known as: Plavix; Take 1 tablet (75   mg) by mouth once daily.; Start taking on: October 25, 2024   dapagliflozin propanediol 10 mg; Commonly known as: Farxiga; Take 1   tablet (10 mg) by mouth once daily.; Start taking on: October 25, 2024   fluticasone furoate-vilanteroL 200-25 mcg/dose inhaler; Commonly known   as: Breo Ellipta; Inhale 1 puff once daily.; Start taking on: October 25, 2024   hydrOXYzine HCL 25 mg tablet; Commonly known as: Atarax; Take 1 tablet   (25 mg) by mouth every 6 hours if needed for anxiety.   metoprolol succinate XL 50 mg 24 hr tablet; Commonly known as:   Toprol-XL; Take 1 tablet (50 mg) by mouth 2 times a day. Do not crush or   chew.   nitroglycerin 0.4 mg SL tablet; Commonly known as: Nitrostat; Place 1   tablet (0.4 mg) under the tongue every 5 minutes if needed for chest pain.   nystatin 100,000 unit/gram powder; Commonly known as: Mycostatin; Apply   1 Application topically 2 times a day.   oxygen gas therapy; Commonly known as: O2; Inhale 1 each once every 24   hours.   oxygen gas therapy; Commonly known as: O2; Inhale 1 each once daily at   bedtime.   pantoprazole 40 mg EC tablet; Commonly known as: ProtoNix; Take 1 tablet   (40 mg) by mouth once daily in the morning. Take before meals. Do not   crush, chew, or split.; Start taking on: October 25, 2024   predniSONE 10 mg tablet; Commonly known as: Deltasone; Take  1 tablet (10   mg) by mouth once daily for 2 doses. Do not fill before October 25, 2024.;   Start taking on: October 25, 2024   rivaroxaban 20 mg tablet; Commonly known as: Xarelto; Take 1 tablet (20   mg) by mouth once daily in the evening. Take with meals. Take with food.   tiotropium 2.5 mcg/actuation inhaler; Commonly known as: Spiriva   Respimat; Inhale 2 puffs once daily.; Start taking on: October 25, 2024     CHANGE how you take these medications     potassium chloride ER 10 mEq ER capsule; Commonly known as: Micro-K;   Take 2 capsules (20 mEq) by mouth once daily. Do not crush or chew.; What   changed: how much to take     CONTINUE taking these medications     albuterol 90 mcg/actuation inhaler; Inhale 2 puffs every 6 hours if   needed for wheezing.   escitalopram 10 mg tablet; Commonly known as: Lexapro; Take 1 tablet (10   mg) by mouth once daily.   furosemide 40 mg tablet; Commonly known as: Lasix; Take 1 tablet (40 mg)   by mouth 2 times a day.   levothyroxine 100 mcg tablet; Commonly known as: Synthroid, Levoxyl;   Take 1 tablet (100 mcg) by mouth once daily in the morning. Take before   meals. Take on an empty stomach   losartan 50 mg tablet; Commonly known as: Cozaar; Take 1 tablet (50 mg)   by mouth once daily.   metFORMIN 500 mg tablet; Commonly known as: Glucophage; Take 1 tablet   (500 mg) by mouth 2 times daily (morning and late afternoon).   * Mounjaro 2.5 mg/0.5 mL pen injector; Generic drug: tirzepatide; Inject   2.5 mg under the skin 1 (one) time per week.   * Mounjaro 5 mg/0.5 mL pen injector; Generic drug: tirzepatide; Inject 5   mg under the skin 1 (one) time per week.   nebulizer accessories misc; 1 Units if needed (SOB).   nebulizer and compressor device; 1 Dose if needed (SOB).   Otezla Starter 10 mg (4)-20 mg (4)-30 mg (47) tablets,dose pack tablet   therapy pack; Generic drug: apremilast; Take 1 tablet by mouth 2 times a   day for 28 days. Do not crush, chew, or split tablets.    silver sulfADIAZINE 1 % cream; Commonly known as: Silvadene; Apply to   affected area twice a day or with each dressing change.   simvastatin 40 mg tablet; Commonly known as: Zocor; Take 1 tablet (40   mg) by mouth once every 24 hours.   triamcinolone 0.1 % cream; Commonly known as: Kenalog; Apply topically 2   times a day. Apply to affected area 1-2 times daily as needed.  * This list has 2 medication(s) that are the same as other medications   prescribed for you. Read the directions carefully, and ask your doctor or   other care provider to review them with you.     STOP taking these medications     doxycycline 100 mg capsule; Commonly known as: Vibramycin   fluticasone propion-salmeteroL 500-50 mcg/dose diskus inhaler; Commonly   known as: Advair Diskus   methylPREDNISolone 4 mg tablets; Commonly known as: Medrol Dospak   spironolactone 100 mg tablet; Commonly known as: Aldactone       Test Results Pending At Discharge  Pending Labs       Order Current Status    Extra Tubes Collected (10/18/24 8963)    Extra Urine Gray Tube Collected (10/19/24 7276)    Urinalysis with Reflex Culture and Microscopic In process            Hospital Course   59yoF hx of tobacco use, T2DM, hypothyroidism, psoriasis, HTN, chronic B/L lower extremity edema, VLADIMIR, asthma/COPD who presented with shortness of breath and chest tightness. EKG obtained in the ER showed 1mm ST elevation in lateral leads with reciprocal ST depressions in the inferior leads. Code STEMI was called. Patient was emergently taken to the cath lab. S/p cardiac cath 10/10 which showed thrombus in the distal diagonal 1, no indication for intervention since the lesion was very distal and the artery diameter is small overall for intervention. Patient was started on aspirin, plavix, and high intensity statin. Following the procedure, patient developed respiratory distress and acute hypoxic respiratory failure due to flash pulmonary edema, pneumonia, and COPD exacerbation.  Placed on BiPAP. Started on empiric antibiotics, diuretics, and systemic steroids. Hospital course complicated by SVT - evaluated by cardiology and was determined to be Afib with RVR. Started on xarelto. Echo showing EF 40-45%. Respiratory status improved with IV diuresis and antibiotics. Diuretics transitioned to oral by cardiology. PT/OT recommending moderate intensity rehab, patient agreeable to SNF placement. Patient will be discharged on augmentin x7 days, 2L NC, and BiPAP nightly. Plan to wean O2 at the facility. Follow up with PCP in 1 week and with cardiology in 2 weeks - will need outpatient cardiac monitor placed. Referral placed to pulmonology and sleep medicine - needs PFTs and sleep study. Patient was also instructed to follow up with general surgery for further treatment of her HS/rectal fistula.     Pertinent Physical Exam At Time of Discharge  Physical Exam  Constitutional:       General: She is not in acute distress.     Appearance: She is not toxic-appearing.   HENT:      Head: Normocephalic.      Mouth/Throat:      Pharynx: Oropharynx is clear.   Eyes:      General: No scleral icterus.  Cardiovascular:      Rate and Rhythm: Normal rate.   Pulmonary:      Effort: No respiratory distress.      Breath sounds: No wheezing.      Comments: 2L NC  Abdominal:      General: There is no distension.      Palpations: Abdomen is soft.      Tenderness: There is no abdominal tenderness.   Musculoskeletal:      Right lower leg: No edema.      Left lower leg: No edema.   Skin:     Comments: Chronic hyperkeratosis and venous stasis of B/L lower extremities   Neurological:      Mental Status: She is alert and oriented to person, place, and time.   Psychiatric:         Behavior: Behavior normal.         Outpatient Follow-Up  Future Appointments   Date Time Provider Department Center   11/8/2024  1:45 PM Sha You DO [address]     Time spent on discharge: 35 minutes    Vonda Schwab MD

## 2024-10-24 NOTE — PROGRESS NOTES
10/24/24 1113   Discharge Planning   Expected Discharge Disposition SNF  ([facility])     Patient has an active discharge order.  Peer to peer approved. [facility] is ready and able to accept patient.  AVS, discharge summary and goldenrod sent to [facility] via [facility].  7000 has been completed. [facility] .  Patient updated at bedside.  Patient will notify family. RN aware of discharge plan.     DISCHARGE PLAN IS SECURE

## 2024-10-24 NOTE — CARE PLAN
Problem: Respiratory  Goal: Minimize anxiety/maximize coping throughout shift  Outcome: Progressing  Goal: Verbalize decreased shortness of breath this shift  Outcome: Progressing

## 2024-10-24 NOTE — CARE PLAN
"The patient's goals for the shift include \"to leave the ICU\"    The clinical goals for the shift include vss, stable sp02 off bipap    Over the shift, the patient did not make progress toward the following goals.         Problem: Safety - Adult  Goal: Free from fall injury  Outcome: Progressing     Problem: Discharge Planning  Goal: Discharge to home or other facility with appropriate resources  Outcome: Progressing     Problem: Chronic Conditions and Co-morbidities  Goal: Patient's chronic conditions and co-morbidity symptoms are monitored and maintained or improved  Outcome: Progressing     Problem: Skin  Goal: Decreased wound size/increased tissue granulation at next dressing change  Outcome: Progressing  Goal: Participates in plan/prevention/treatment measures  Outcome: Progressing  Goal: Prevent/manage excess moisture  Outcome: Progressing  Goal: Prevent/minimize sheer/friction injuries  Outcome: Progressing  Goal: Promote/optimize nutrition  Outcome: Progressing  Goal: Promote skin healing  Outcome: Progressing     Problem: Respiratory  Goal: Clear secretions with interventions this shift  Outcome: Progressing  Goal: Minimize anxiety/maximize coping throughout shift  Outcome: Progressing  Goal: Minimal/no exertional discomfort or dyspnea this shift  Outcome: Progressing  Goal: No signs of respiratory distress (eg. Use of accessory muscles. Peds grunting)  Outcome: Progressing  Goal: Patent airway maintained this shift  Outcome: Progressing  Goal: Tolerate pulmonary toileting this shift  Outcome: Progressing  Goal: Verbalize decreased shortness of breath this shift  Outcome: Progressing  Goal: Wean oxygen to maintain O2 saturation per order/standard this shift  Outcome: Progressing  Goal: Increase self care and/or family involvement in next 24 hours  Outcome: Progressing     Problem: Bathing  Goal: LTG - Patient will utilize adaptive techniques to bathe body Min A  Outcome: Progressing     Problem: Dressings " Lower Extremities  Goal: LTG - Patient will dress lower body Min A  Outcome: Progressing     Problem: Dressing Upper Extremities  Goal: LTG - Patient will complete upper body dressing Mod I  Outcome: Progressing     Problem: Toileting  Goal: LTG - Patient will complete daily toileting tasks Min A  Outcome: Progressing     Problem: Pain  Goal: Takes deep breaths with improved pain control throughout the shift  Outcome: Progressing  Goal: Turns in bed with improved pain control throughout the shift  Outcome: Progressing  Goal: Performs ADL's with improved pain control throughout shift  Outcome: Progressing  Goal: Participates in PT with improved pain control throughout the shift  Outcome: Progressing     Problem: Heart Failure  Goal: Improved gas exchange this shift  Outcome: Progressing  Goal: Improved urinary output this shift  Outcome: Progressing  Goal: Reduction in peripheral edema within 24 hours  Outcome: Progressing  Goal: Report improvement of dyspnea/breathlessness this shift  Outcome: Progressing  Goal: Weight from fluid excess reduced over 2-3 days, then stabilize  Outcome: Progressing  Goal: Increase self care and/or family involvement in 24 hours  Outcome: Progressing     Problem: Fall/Injury  Goal: Not fall by end of shift  Outcome: Progressing  Goal: Be free from injury by end of the shift  Outcome: Progressing  Goal: Verbalize understanding of personal risk factors for fall in the hospital  Outcome: Progressing  Goal: Verbalize understanding of risk factor reduction measures to prevent injury from fall in the home  Outcome: Progressing  Goal: Use assistive devices by end of the shift  Outcome: Progressing  Goal: Pace activities to prevent fatigue by end of the shift  Outcome: Progressing     Problem: Nutrition  Goal: Oral intake greater 75%  Outcome: Progressing  Goal: Reduce weight from edema/fluid  Outcome: Progressing

## 2024-10-24 NOTE — PROGRESS NOTES
Occupational Therapy    OT Treatment    Patient Name: Kay Pike  MRN: [id number]  Department: [facility]  Room: 16/16  Today's Date: 10/24/2024  Time Calculation  Start Time: 0936  Stop Time: 1000  Time Calculation (min): 24 min        Assessment:  OT Assessment: Tolerated session well, demonstrating continued progression towards POC with increased functional tolerance noted by therapist + pt. Pt would benefit from continued skilled OT services to improve strength, balance, and functional tolerance to increase independence with ADL tasks  End of Session Communication: Bedside nurse  End of Session Patient Position: Up in chair, Alarm on  OT Assessment Results: Decreased ADL status, Decreased endurance, Decreased functional mobility  Plan:  Treatment Interventions: ADL retraining, Functional transfer training, Endurance training, Patient/family training, Equipment evaluation/education, Compensatory technique education, Continued evaluation  OT Frequency: 4 times per week  OT Discharge Recommendations: Moderate intensity level of continued care  OT Recommended Transfer Status: Minimal assist, Assist of 1  OT - OK to Discharge: Yes  Treatment Interventions: ADL retraining, Functional transfer training, Endurance training, Patient/family training, Equipment evaluation/education, Compensatory technique education, Continued evaluation    Subjective   Previous Visit Info:  OT Last Visit  OT Received On: 10/24/24  General:  General  Prior to Session Communication: Bedside nurse  Patient Position Received: Bed, 3 rail up, Alarm off, not on at start of session  General Comment: Cleared for therapy per RN. Pt supine in bed upon arrival and agreeable to tx  Precautions:  Hearing/Visual Limitations: reading glasses  Medical Precautions: Fall precautions, Oxygen therapy device and L/min (2L O2 nc)    Vital Signs (Past 2hrs)        Date/Time Vitals Session Patient Position Pulse Resp SpO2 BP MAP (mmHg)    10/24/24 0936 --  --   --  --  97 %  --  --                   Pain:  Pain Assessment  Pain Assessment: 0-10  0-10 (Numeric) Pain Score: 0 - No pain    Objective    Cognition:  Cognition  Overall Cognitive Status: Within Functional Limits    Activities of Daily Living:    Grooming  Grooming Level of Assistance: Setup  Grooming Where Assessed: Chair  Grooming Comments: oral hygiene task at end of session    Bed Mobility/Transfers: Bed Mobility  Bed Mobility: Yes  Bed Mobility 1  Bed Mobility 1: Supine to sitting  Level of Assistance 1: Close supervision  Bed Mobility Comments 1: increased time to complete supine sit, use of bed rail for UE support    Transfers  Transfer: Yes  Transfer 1  Technique 1: Sit to stand, Stand to sit  Transfer Device 1: Walker  Transfer Level of Assistance 1: Close supervision  Trials/Comments 1: demonstrating good transfer technique with cues for eccentric lowering to chair    Functional Mobility:  Functional Mobility  Functional Mobility Performed: Yes  Functional Mobility 1  Device 1: Rolling walker  Assistance 1: Contact guard  Comments 1: functional mobility extended household distance at FWW with cues for pacing and use of pursed lip breathing, demonstrating fair balance throughout task    Standing Balance:  Dynamic Standing Balance  Dynamic Standing-Level of Assistance: Close supervision  Dynamic Standing-Balance: Forward lean, Reaching for objects, Reaching across midline  Dynamic Standing-Comments: fair balance during functional reaching task in cabinet    Therapy/Activity: Therapeutic Exercise  Therapeutic Exercise Performed: Yes  Therapeutic Exercise Activity 1: participated in AROM BUE exercises 4x10 in all planes to improve strength and functional tolerance to increase independence with transfer tasks with cues provided for proper muscle recruitement    Therapeutic Activity  Therapeutic Activity Performed: Yes  Therapeutic Activity 1: participated in item retrieval task in cabinet, reaching for items  at various levels working on functional balance in effort to increase independence with ADL/IADL tasks upon homegoing, demonstrating fair balance throughout    Outcome Measures:[facility] Daily Activity  Putting on and taking off regular lower body clothing: A lot  Bathing (including washing, rinsing, drying): A little  Putting on and taking off regular upper body clothing: A little  Toileting, which includes using toilet, bedpan or urinal: A little  Taking care of personal grooming such as brushing teeth: A little  Eating Meals: None  Daily Activity - Total Score: 18    Education Documentation  Body Mechanics, taught by TOMMY Flores at 10/24/2024 11:04 AM.  Learner: Patient  Readiness: Acceptance  Method: Explanation  Response: Verbalizes Understanding, Demonstrated Understanding    Home Exercise Program, taught by TOMMY Flores at 10/24/2024 11:04 AM.  Learner: Patient  Readiness: Acceptance  Method: Explanation  Response: Verbalizes Understanding, Demonstrated Understanding    ADL Training, taught by TOMMY Flores at 10/24/2024 11:04 AM.  Learner: Patient  Readiness: Acceptance  Method: Explanation  Response: Verbalizes Understanding, Demonstrated Understanding    Education Comments  No comments found.      Problem: Functional Mobility  Goal: Pt will demonstrate sitting/standing tolerance x 4 min, without significant inc in RR to complete ADL routine.   10/24/2024 1104 by TOMMY Flores  Outcome: Met     Problem: Toileting  Goal: LTG - Patient will complete daily toileting tasks Min A  10/24/2024 1104 by TOMMY Flores  Outcome: Met    Problem: Bathing  Goal: LTG - Patient will utilize adaptive techniques to bathe body Min A  10/24/2024 1104 by TOMMY Flores       Problem: Dressings Lower Extremities  Goal: LTG - Patient will dress lower body Min A  10/24/2024 1104 by TOMMY Flores  Outcome: Progressing     Problem:  Dressing Upper Extremities  Goal: LTG - Patient will complete upper body dressing Mod I  10/24/2024 1104 by TOMMY Flores  Outcome: Progressing

## 2024-10-24 NOTE — PROGRESS NOTES
Spiritual Care Visit    Clinical Encounter Type  Visited With: Patient  Routine Visit: Follow-up  Continue Visiting: Yes         Values/Beliefs  Spiritual Requests During Hospitalization: Kay asked for Communion tody. Nice conversatoin too!    Sacramental Encounters  Communion: Patient wants communion  Communion Given Indicator: Yes     Stephane Felder

## 2024-10-28 ENCOUNTER — TELEPHONE (OUTPATIENT)
Dept: PRIMARY CARE | Facility: CLINIC | Age: 59
End: 2024-10-28
Payer: COMMERCIAL

## 2024-10-28 ENCOUNTER — PATIENT OUTREACH (OUTPATIENT)
Dept: CASE MANAGEMENT | Facility: HOSPITAL | Age: 59
End: 2024-10-28
Payer: COMMERCIAL

## 2024-10-28 DIAGNOSIS — I21.3 STEMI (ST ELEVATION MYOCARDIAL INFARCTION) (MULTI): ICD-10-CM

## 2024-10-28 DIAGNOSIS — I48.0 PAROXYSMAL ATRIAL FIBRILLATION (MULTI): ICD-10-CM

## 2024-10-28 DIAGNOSIS — J20.9 COPD (CHRONIC OBSTRUCTIVE PULMONARY DISEASE) WITH ACUTE BRONCHITIS (MULTI): ICD-10-CM

## 2024-10-28 DIAGNOSIS — J44.0 COPD (CHRONIC OBSTRUCTIVE PULMONARY DISEASE) WITH ACUTE BRONCHITIS (MULTI): ICD-10-CM

## 2024-10-28 DIAGNOSIS — I50.21 ACUTE SYSTOLIC HEART FAILURE: ICD-10-CM

## 2024-10-28 DIAGNOSIS — R05.9 COUGH, UNSPECIFIED TYPE: ICD-10-CM

## 2024-10-28 DIAGNOSIS — J18.9 PNEUMONIA OF BOTH LOWER LOBES DUE TO INFECTIOUS ORGANISM: ICD-10-CM

## 2024-10-28 DIAGNOSIS — E11.9 TYPE 2 DIABETES MELLITUS WITHOUT COMPLICATION, WITHOUT LONG-TERM CURRENT USE OF INSULIN (MULTI): ICD-10-CM

## 2024-10-28 RX ORDER — METOPROLOL SUCCINATE 50 MG/1
50 TABLET, EXTENDED RELEASE ORAL 2 TIMES DAILY
Qty: 90 TABLET | Refills: 1 | Status: SHIPPED | OUTPATIENT
Start: 2024-10-28

## 2024-10-28 RX ORDER — METFORMIN HYDROCHLORIDE 500 MG/1
500 TABLET ORAL
Qty: 180 TABLET | Refills: 1 | Status: SHIPPED | OUTPATIENT
Start: 2024-10-28

## 2024-10-28 RX ORDER — CLOPIDOGREL BISULFATE 75 MG/1
75 TABLET ORAL DAILY
Qty: 90 TABLET | Refills: 1 | Status: SHIPPED | OUTPATIENT
Start: 2024-10-28

## 2024-10-28 RX ORDER — PREDNISONE 10 MG/1
10 TABLET ORAL DAILY
Qty: 2 TABLET | Refills: 0 | Status: CANCELLED | OUTPATIENT
Start: 2024-10-28 | End: 2024-10-30

## 2024-10-28 RX ORDER — DAPAGLIFLOZIN 10 MG/1
10 TABLET, FILM COATED ORAL DAILY
Qty: 90 TABLET | Refills: 1 | Status: SHIPPED | OUTPATIENT
Start: 2024-10-28

## 2024-10-28 RX ORDER — HYDROXYZINE HYDROCHLORIDE 25 MG/1
25 TABLET, FILM COATED ORAL EVERY 6 HOURS PRN
Qty: 90 TABLET | Refills: 1 | Status: SHIPPED | OUTPATIENT
Start: 2024-10-28

## 2024-10-28 RX ORDER — AMOXICILLIN AND CLAVULANATE POTASSIUM 875; 125 MG/1; MG/1
1 TABLET, FILM COATED ORAL EVERY 12 HOURS SCHEDULED
Qty: 12 TABLET | Refills: 0 | Status: SHIPPED | OUTPATIENT
Start: 2024-10-28 | End: 2024-11-03

## 2024-10-28 RX ORDER — ALBUTEROL SULFATE 90 UG/1
2 INHALANT RESPIRATORY (INHALATION) EVERY 6 HOURS PRN
Qty: 25.5 G | Refills: 0 | Status: SHIPPED | OUTPATIENT
Start: 2024-10-28

## 2024-10-30 ENCOUNTER — APPOINTMENT (OUTPATIENT)
Dept: PRIMARY CARE | Facility: CLINIC | Age: 59
End: 2024-10-30
Payer: COMMERCIAL

## 2024-10-31 ENCOUNTER — OFFICE VISIT (OUTPATIENT)
Dept: PRIMARY CARE | Facility: CLINIC | Age: 59
End: 2024-10-31
Payer: COMMERCIAL

## 2024-10-31 VITALS
BODY MASS INDEX: 45.08 KG/M2 | WEIGHT: 245 LBS | HEIGHT: 62 IN | OXYGEN SATURATION: 99 % | DIASTOLIC BLOOD PRESSURE: 68 MMHG | SYSTOLIC BLOOD PRESSURE: 116 MMHG | HEART RATE: 76 BPM

## 2024-10-31 DIAGNOSIS — I48.91 ATRIAL FIBRILLATION, UNSPECIFIED TYPE (MULTI): ICD-10-CM

## 2024-10-31 DIAGNOSIS — Z72.0 TOBACCO ABUSE: ICD-10-CM

## 2024-10-31 DIAGNOSIS — E03.9 HYPOTHYROIDISM, UNSPECIFIED TYPE: ICD-10-CM

## 2024-10-31 DIAGNOSIS — J20.9 COPD (CHRONIC OBSTRUCTIVE PULMONARY DISEASE) WITH ACUTE BRONCHITIS (MULTI): Primary | ICD-10-CM

## 2024-10-31 DIAGNOSIS — E11.9 TYPE 2 DIABETES MELLITUS WITHOUT COMPLICATION, WITHOUT LONG-TERM CURRENT USE OF INSULIN (MULTI): ICD-10-CM

## 2024-10-31 DIAGNOSIS — J44.0 COPD (CHRONIC OBSTRUCTIVE PULMONARY DISEASE) WITH ACUTE BRONCHITIS (MULTI): Primary | ICD-10-CM

## 2024-10-31 DIAGNOSIS — K60.40 RECTAL FISTULA: ICD-10-CM

## 2024-10-31 DIAGNOSIS — R60.0 BILATERAL LEG EDEMA: ICD-10-CM

## 2024-10-31 PROCEDURE — 3044F HG A1C LEVEL LT 7.0%: CPT | Performed by: PHYSICIAN ASSISTANT

## 2024-10-31 PROCEDURE — 3074F SYST BP LT 130 MM HG: CPT | Performed by: PHYSICIAN ASSISTANT

## 2024-10-31 PROCEDURE — 99214 OFFICE O/P EST MOD 30 MIN: CPT | Performed by: PHYSICIAN ASSISTANT

## 2024-10-31 PROCEDURE — 3078F DIAST BP <80 MM HG: CPT | Performed by: PHYSICIAN ASSISTANT

## 2024-10-31 PROCEDURE — 3008F BODY MASS INDEX DOCD: CPT | Performed by: PHYSICIAN ASSISTANT

## 2024-10-31 PROCEDURE — 4010F ACE/ARB THERAPY RXD/TAKEN: CPT | Performed by: PHYSICIAN ASSISTANT

## 2024-10-31 RX ORDER — IBUPROFEN 200 MG
1 TABLET ORAL EVERY 24 HOURS
Qty: 28 PATCH | Refills: 1 | Status: SHIPPED | OUTPATIENT
Start: 2024-10-31 | End: 2024-12-26

## 2024-10-31 ASSESSMENT — PATIENT HEALTH QUESTIONNAIRE - PHQ9
1. LITTLE INTEREST OR PLEASURE IN DOING THINGS: NOT AT ALL
2. FEELING DOWN, DEPRESSED OR HOPELESS: NOT AT ALL
SUM OF ALL RESPONSES TO PHQ9 QUESTIONS 1 AND 2: 0

## 2024-10-31 ASSESSMENT — PAIN SCALES - GENERAL: PAINLEVEL_OUTOF10: 0-NO PAIN

## 2024-11-08 ENCOUNTER — OFFICE VISIT (OUTPATIENT)
Dept: CARDIOLOGY | Facility: CLINIC | Age: 59
End: 2024-11-08
Payer: COMMERCIAL

## 2024-11-08 VITALS
SYSTOLIC BLOOD PRESSURE: 120 MMHG | HEART RATE: 74 BPM | DIASTOLIC BLOOD PRESSURE: 50 MMHG | BODY MASS INDEX: 46.27 KG/M2 | OXYGEN SATURATION: 98 % | WEIGHT: 253 LBS

## 2024-11-08 DIAGNOSIS — I48.0 PAROXYSMAL ATRIAL FIBRILLATION (MULTI): ICD-10-CM

## 2024-11-08 DIAGNOSIS — E11.9 TYPE 2 DIABETES MELLITUS WITHOUT COMPLICATION, WITHOUT LONG-TERM CURRENT USE OF INSULIN (MULTI): ICD-10-CM

## 2024-11-08 DIAGNOSIS — I21.29 ST ELEVATION MYOCARDIAL INFARCTION (STEMI) INVOLVING OTHER CORONARY ARTERY: Primary | ICD-10-CM

## 2024-11-08 PROCEDURE — 99214 OFFICE O/P EST MOD 30 MIN: CPT | Performed by: INTERNAL MEDICINE

## 2024-11-08 PROCEDURE — 4010F ACE/ARB THERAPY RXD/TAKEN: CPT | Performed by: INTERNAL MEDICINE

## 2024-11-08 PROCEDURE — 4004F PT TOBACCO SCREEN RCVD TLK: CPT | Performed by: INTERNAL MEDICINE

## 2024-11-08 PROCEDURE — 3044F HG A1C LEVEL LT 7.0%: CPT | Performed by: INTERNAL MEDICINE

## 2024-11-08 PROCEDURE — 3078F DIAST BP <80 MM HG: CPT | Performed by: INTERNAL MEDICINE

## 2024-11-08 PROCEDURE — 3074F SYST BP LT 130 MM HG: CPT | Performed by: INTERNAL MEDICINE

## 2024-11-08 ASSESSMENT — PAIN SCALES - GENERAL: PAINLEVEL_OUTOF10: 0-NO PAIN

## 2024-11-08 ASSESSMENT — ENCOUNTER SYMPTOMS
PALPITATIONS: 0
DYSPNEA ON EXERTION: 1
IRREGULAR HEARTBEAT: 0
DEPRESSION: 0
NEAR-SYNCOPE: 0
COUGH: 0
LOSS OF SENSATION IN FEET: 0
WEIGHT GAIN: 0
PND: 0
WHEEZING: 0
DIAPHORESIS: 0
CLAUDICATION: 0
WEAKNESS: 0
ORTHOPNEA: 0
DIZZINESS: 0
SHORTNESS OF BREATH: 0
MYALGIAS: 0
WEIGHT LOSS: 0
SYNCOPE: 0
OCCASIONAL FEELINGS OF UNSTEADINESS: 0
FEVER: 0

## 2024-11-08 ASSESSMENT — PATIENT HEALTH QUESTIONNAIRE - PHQ9
2. FEELING DOWN, DEPRESSED OR HOPELESS: NOT AT ALL
1. LITTLE INTEREST OR PLEASURE IN DOING THINGS: NOT AT ALL
SUM OF ALL RESPONSES TO PHQ9 QUESTIONS 1 AND 2: 0

## 2024-11-08 NOTE — PROGRESS NOTES
Subjective      Chief Complaint   Patient presents with    cath f/u        59-year-old female presented to [facility] last month with shortness of breath and respiratory failure.  Her initial EKG did show some subtle ST elevations in the lateral leads with reciprocal depression.  She was catheterized to find thrombus distally in the first diagonal.  She had very mild nonobstructive disease otherwise.  She is placed on dual antiplatelet therapy, aspirin and Brilinta as well as a high potency statin.  She had an echocardiogram that showed an ejection fraction of 40 to 45%.  Her hospital stay was complicated further by COPD exacerbation, also atrial fibrillation which we utilized a rate control for.  We did place her on oral anticoagulation and truncated her antiplatelet therapy to ticagrelor alone. She is now on clopidogrel and xarelto. She is up 12 lbs since hospitalization. She has no chest pain, dyspnea is stable           Review of Systems   Constitutional: Negative for diaphoresis, fever, weight gain and weight loss.   Eyes:  Negative for visual disturbance.   Cardiovascular:  Positive for dyspnea on exertion and leg swelling. Negative for chest pain, claudication, irregular heartbeat, near-syncope, orthopnea, palpitations, paroxysmal nocturnal dyspnea and syncope.   Respiratory:  Negative for cough, shortness of breath and wheezing.    Musculoskeletal:  Negative for muscle weakness and myalgias.   Neurological:  Negative for dizziness and weakness.   All other systems reviewed and are negative.       No past medical history on file.     Past Surgical History:   Procedure Laterality Date    CARDIAC CATHETERIZATION N/A 10/10/2024    Procedure: Left Heart Cath, No LV;  Surgeon: René Kohli MD;  Location: [facility] Cardiac Cath Lab;  Service: Cardiovascular;  Laterality: N/A;        Social History     Socioeconomic History    Marital status:      Spouse name: Not on file    Number of children: Not on  file    Years of education: Not on file    Highest education level: Not on file   Occupational History    Not on file   Tobacco Use    Smoking status: Every Day     Types: Cigarettes    Smokeless tobacco: Never   Substance and Sexual Activity    Alcohol use: Never    Drug use: Never    Sexual activity: Defer   Other Topics Concern    Not on file   Social History Narrative    Not on file     Social Drivers of Health     Financial Resource Strain: Medium Risk (10/11/2024)    Overall Financial Resource Strain (CARDIA)     Difficulty of Paying Living Expenses: Somewhat hard   Food Insecurity: Food Insecurity Present (10/11/2024)    Hunger Vital Sign     Worried About Running Out of Food in the Last Year: Sometimes true     Ran Out of Food in the Last Year: Sometimes true   Transportation Needs: No Transportation Needs (10/11/2024)    PRAPARE - Transportation     Lack of Transportation (Medical): No     Lack of Transportation (Non-Medical): No   Physical Activity: Inactive (10/11/2024)    Exercise Vital Sign     Days of Exercise per Week: 0 days     Minutes of Exercise per Session: 0 min   Stress: No Stress Concern Present (10/10/2024)    [employer] [place] [employer] - Occupational Stress Questionnaire     Feeling of Stress : Not at all   Social Connections: Socially Isolated (10/11/2024)    Social Connection and Isolation Panel [NHANES]     Frequency of Communication with Friends and Family: Twice a week     Frequency of Social Gatherings with Friends and Family: Once a week     Attends Worship Services: Never     Active Member of Clubs or Organizations: No     Attends Club or Organization Meetings: Never     Marital Status:    Intimate Partner Violence: Not At Risk (10/11/2024)    Humiliation, Afraid, Rape, and Kick questionnaire     Fear of Current or Ex-Partner: No     Emotionally Abused: No     Physically Abused: No     Sexually Abused: No   Housing Stability: High Risk (10/11/2024)    Housing  Stability Vital Sign     Unable to Pay for Housing in the Last Year: Yes     Number of Times Moved in the Last Year: 0     Homeless in the Last Year: No        Family History   Problem Relation Name Age of Onset    Diabetes Mother      Diabetes Father          OBJECTIVE:    Vitals:    11/08/24 1334   BP: 120/50   Pulse: 74   SpO2: 98%        Vitals reviewed.   Constitutional:       Appearance: Normal and healthy appearance. Not in distress.   Pulmonary:      Effort: Pulmonary effort is normal.      Breath sounds: Normal breath sounds.   Cardiovascular:      Normal rate. Regular rhythm. Normal S1. Normal S2.       Murmurs: There is no murmur.      No gallop.  No click.   Pulses:     Intact distal pulses.   Edema:     Peripheral edema present.     Pretibial: bilateral 3+ edema of the pretibial area.     Ankle: bilateral 3+ edema of the ankle.     Feet: bilateral 3+ edema of the feet.  Skin:     General: Skin is warm and dry.   Neurological:      General: No focal deficit present.          Lab Review:   Lab Results   Component Value Date    CHOL 125 (L) 03/03/2021    TRIG 73 03/03/2021    HDL 38 (L) 03/03/2021    LDLDIRECT 110 08/08/2018       Lab Results   Component Value Date    LDLCALC 72 03/03/2021        Atrial fibrillation (Multi)  Nsr currently. Continue BB and oral AC    Acute systolic heart failure  HFmrEF. Most of edema is non-pitting. She has gained 12lbs will increase her lasix to 80mg BID until Wed 11/13. She will call w an update. Doubling K= as well and checking a BMP next week    STEMI (ST elevation myocardial infarction) (Multi)  Continue statin and plavix. Phase 2 CR

## 2024-11-08 NOTE — ASSESSMENT & PLAN NOTE
HFmrEF. Most of edema is non-pitting. She has gained 12lbs will increase her lasix to 80mg BID until Wed 11/13. She will call w an update. Doubling K= as well and checking a BMP next week

## 2024-11-15 ENCOUNTER — PATIENT OUTREACH (OUTPATIENT)
Dept: CASE MANAGEMENT | Facility: HOSPITAL | Age: 59
End: 2024-11-15
Payer: COMMERCIAL

## 2024-11-15 NOTE — PROGRESS NOTES
Heart Failure Nurse Navigator Transition of Care Phone Call    The role of the HF nurse navigator is to (1) characterize risk profiles of patients with heart failure transitioning from [id number]-[id number] after hospitalization, (2) recommend interventions to improve care and reduce risks of worse post-hospitalization outcomes.     Assessment  Call out to patient . No answer, left message on voicemail with my contact info to return my call.     Medications  Is the patient prescribed the following medications?  ACEi/ARB/ARNi? Yes  BB? Yes  MRA? No  SGLT2i? Yes  Diuretic? Yes    Management   Does the patient have a  cardiology follow-up scheduled? YES  If yes, appointment details? Pt saw Dr. You on 11/8/24. Pt had some swelling and lasix was increased to 80 mg a day for the next 2 days per visit notes.      Waleska ANDERSONN RN  [facility] Clinical Nurse Navigator, CHF  [phone]

## 2024-11-20 NOTE — PROGRESS NOTES
S/P went over surgical protocol and scheduled Post Op Appointment. Patient had no other questions or concerns. I did advise the patient if they have any additional questions to give us a call back for further assistance.     TCC met with patient's sister as patient was on bipap. Assessment complete. Patient's sister and her daughter live with the patient. Patient is recently  and was left with a lot of bills. Patient is independent. Patient has been ill and has not been doing what she would normally do. Patient does not drive, her sister transports. Patient's sister does the shopping, cooking and cleaning. Patient smokes over 1PPD. Patient follows JOVAN Osorio. Patient fills prescriptions at [facility] in [place]. At this time there is not a safe discharge plan in place. Will follow.      10/11/24 4583   Discharge Planning   Living Arrangements Family members   Support Systems Family members   Type of Residence Private residence   Home or Post Acute Services Other (Comment)  (TBD)   Expected Discharge Disposition Othe  (TBD)   Does the patient need discharge transport arranged? No   Financial Resource Strain   How hard is it for you to pay for the very basics like food, housing, medical care, and heating? Somewhat   Housing Stability   In the last 12 months, was there a time when you were not able to pay the mortgage or rent on time? Y   In the past 12 months, how many times have you moved where you were living? 0   At any time in the past 12 months, were you homeless or living in a shelter (including now)? N   Transportation Needs   In the past 12 months, has lack of transportation kept you from medical appointments or from getting medications? no   In the past 12 months, has lack of transportation kept you from meetings, work, or from getting things needed for daily living? No

## 2024-11-21 ENCOUNTER — PATIENT OUTREACH (OUTPATIENT)
Dept: CASE MANAGEMENT | Facility: HOSPITAL | Age: 59
End: 2024-11-21
Payer: COMMERCIAL

## 2024-11-21 NOTE — PROGRESS NOTES
Heart Failure Nurse Navigator Transition of Care Phone Call     The role of the HF nurse navigator is to (1) characterize risk profiles of patients with heart failure transitioning from vqioamqv-ry-ctrjqlzci after hospitalization, (2) recommend interventions to improve care and reduce risks of worse post-hospitalization outcomes.      Assessment  Call out to patient . No answer, left message on voicemail with my contact info to return my call.      Medications  Is the patient prescribed the following medications?  ACEi/ARB/ARNi? Yes  BB? Yes  MRA? No  SGLT2i? Yes  Diuretic? Yes     Management   Does the patient have a  cardiology follow-up scheduled? YES  If yes, appointment details? Pt saw Dr. You on 11/8/24. Pt had some swelling and lasix was increased to 80 mg a day for the next 2 days per visit notes.        Waleska ANDERSONN RN  [facility] Clinical Nurse Navigator, CHF  [phone]

## 2024-12-02 ENCOUNTER — PATIENT OUTREACH (OUTPATIENT)
Dept: CASE MANAGEMENT | Facility: HOSPITAL | Age: 59
End: 2024-12-02
Payer: COMMERCIAL

## 2024-12-02 NOTE — PROGRESS NOTES
Heart Failure Nurse Navigator Transition of Care Phone Call     The role of the HF nurse navigator is to (1) characterize risk profiles of patients with heart failure transitioning from ldaoaysw-[id number]-raswoqikv after hospitalization, (2) recommend interventions to improve care and reduce risks of worse post-hospitalization outcomes.      Assessment  Call out to patient . No answer, left message on voicemail with my contact info to return my call.      Medications  Is the patient prescribed the following medications?  ACEi/ARB/ARNi? Yes  BB? Yes  MRA? No  SGLT2i? Yes  Diuretic? Yes     Management   Does the patient have a  cardiology follow-up scheduled? YES  If yes, appointment details? Pt saw Dr. You on 11/8/24. Pt had some swelling and lasix was increased to 80 mg a day for the next 2 days per visit notes.      Pt was DC from [facility] on 10/24/24 with no readmission for >30 days. I have reached out to her 3 times with no response. I am closing her case at this time.      Waleska ANDERSONN RN  [facility] Clinical Nurse Navigator, CHF  [phone]

## 2025-01-03 ENCOUNTER — OFFICE VISIT (OUTPATIENT)
Dept: PRIMARY CARE | Facility: CLINIC | Age: 60
End: 2025-01-03
Payer: COMMERCIAL

## 2025-01-03 VITALS
HEIGHT: 62 IN | HEART RATE: 85 BPM | OXYGEN SATURATION: 95 % | BODY MASS INDEX: 46.74 KG/M2 | SYSTOLIC BLOOD PRESSURE: 118 MMHG | WEIGHT: 254 LBS | DIASTOLIC BLOOD PRESSURE: 64 MMHG

## 2025-01-03 DIAGNOSIS — R60.0 BILATERAL LEG EDEMA: ICD-10-CM

## 2025-01-03 DIAGNOSIS — F41.9 ANXIETY: ICD-10-CM

## 2025-01-03 DIAGNOSIS — J44.0 COPD (CHRONIC OBSTRUCTIVE PULMONARY DISEASE) WITH ACUTE BRONCHITIS (MULTI): ICD-10-CM

## 2025-01-03 DIAGNOSIS — E03.9 HYPOTHYROIDISM, UNSPECIFIED TYPE: ICD-10-CM

## 2025-01-03 DIAGNOSIS — E11.9 TYPE 2 DIABETES MELLITUS WITHOUT COMPLICATION, WITHOUT LONG-TERM CURRENT USE OF INSULIN (MULTI): Primary | ICD-10-CM

## 2025-01-03 DIAGNOSIS — J20.9 COPD (CHRONIC OBSTRUCTIVE PULMONARY DISEASE) WITH ACUTE BRONCHITIS (MULTI): ICD-10-CM

## 2025-01-03 DIAGNOSIS — I21.3 STEMI (ST ELEVATION MYOCARDIAL INFARCTION) (MULTI): ICD-10-CM

## 2025-01-03 DIAGNOSIS — I50.21 ACUTE SYSTOLIC HEART FAILURE: ICD-10-CM

## 2025-01-03 DIAGNOSIS — I89.0 LYMPHEDEMA OF BOTH LOWER EXTREMITIES: ICD-10-CM

## 2025-01-03 DIAGNOSIS — I48.0 PAROXYSMAL ATRIAL FIBRILLATION (MULTI): ICD-10-CM

## 2025-01-03 DIAGNOSIS — L73.2 HIDRADENITIS: ICD-10-CM

## 2025-01-03 LAB — POC HEMOGLOBIN A1C: 5.8 % (ref 4.2–6.5)

## 2025-01-03 PROCEDURE — 3074F SYST BP LT 130 MM HG: CPT | Performed by: PHYSICIAN ASSISTANT

## 2025-01-03 PROCEDURE — 83036 HEMOGLOBIN GLYCOSYLATED A1C: CPT | Performed by: PHYSICIAN ASSISTANT

## 2025-01-03 PROCEDURE — 99214 OFFICE O/P EST MOD 30 MIN: CPT | Performed by: PHYSICIAN ASSISTANT

## 2025-01-03 PROCEDURE — 3008F BODY MASS INDEX DOCD: CPT | Performed by: PHYSICIAN ASSISTANT

## 2025-01-03 PROCEDURE — 4010F ACE/ARB THERAPY RXD/TAKEN: CPT | Performed by: PHYSICIAN ASSISTANT

## 2025-01-03 PROCEDURE — 3078F DIAST BP <80 MM HG: CPT | Performed by: PHYSICIAN ASSISTANT

## 2025-01-03 RX ORDER — CLOPIDOGREL BISULFATE 75 MG/1
75 TABLET ORAL DAILY
Qty: 90 TABLET | Refills: 1 | Status: SHIPPED | OUTPATIENT
Start: 2025-01-03

## 2025-01-03 RX ORDER — FUROSEMIDE 40 MG/1
40 TABLET ORAL 2 TIMES DAILY
Qty: 90 TABLET | Refills: 1 | Status: SHIPPED | OUTPATIENT
Start: 2025-01-03

## 2025-01-03 RX ORDER — METOPROLOL SUCCINATE 50 MG/1
50 TABLET, EXTENDED RELEASE ORAL 2 TIMES DAILY
Qty: 90 TABLET | Refills: 1 | Status: SHIPPED | OUTPATIENT
Start: 2025-01-03

## 2025-01-03 RX ORDER — DAPAGLIFLOZIN 10 MG/1
10 TABLET, FILM COATED ORAL DAILY
Qty: 90 TABLET | Refills: 1 | Status: SHIPPED | OUTPATIENT
Start: 2025-01-03

## 2025-01-03 RX ORDER — POTASSIUM CHLORIDE 750 MG/1
20 CAPSULE, EXTENDED RELEASE ORAL DAILY
Qty: 90 CAPSULE | Refills: 1 | Status: SHIPPED | OUTPATIENT
Start: 2025-01-03

## 2025-01-03 RX ORDER — PANTOPRAZOLE SODIUM 40 MG/1
40 TABLET, DELAYED RELEASE ORAL
Qty: 90 TABLET | Refills: 2 | Status: SHIPPED | OUTPATIENT
Start: 2025-01-03

## 2025-01-03 RX ORDER — FLUTICASONE FUROATE AND VILANTEROL 200; 25 UG/1; UG/1
1 POWDER RESPIRATORY (INHALATION)
Qty: 60 EACH | Refills: 11 | Status: SHIPPED | OUTPATIENT
Start: 2025-01-03

## 2025-01-03 RX ORDER — SIMVASTATIN 40 MG/1
40 TABLET, FILM COATED ORAL EVERY 24 HOURS
Qty: 90 TABLET | Refills: 1 | Status: SHIPPED | OUTPATIENT
Start: 2025-01-03

## 2025-01-03 RX ORDER — LOSARTAN POTASSIUM 50 MG/1
50 TABLET ORAL DAILY
Qty: 90 TABLET | Refills: 3 | Status: SHIPPED | OUTPATIENT
Start: 2025-01-03 | End: 2026-01-03

## 2025-01-03 RX ORDER — ESCITALOPRAM OXALATE 10 MG/1
10 TABLET ORAL DAILY
Qty: 90 TABLET | Refills: 0 | Status: SHIPPED | OUTPATIENT
Start: 2025-01-03

## 2025-01-03 RX ORDER — METFORMIN HYDROCHLORIDE 500 MG/1
500 TABLET ORAL
Qty: 180 TABLET | Refills: 1 | Status: SHIPPED | OUTPATIENT
Start: 2025-01-03

## 2025-01-03 RX ORDER — LEVOTHYROXINE SODIUM 100 UG/1
100 TABLET ORAL
Qty: 90 TABLET | Refills: 1 | Status: SHIPPED | OUTPATIENT
Start: 2025-01-03

## 2025-01-03 RX ORDER — TIRZEPATIDE 5 MG/.5ML
5 INJECTION, SOLUTION SUBCUTANEOUS
Qty: 2 ML | Refills: 2 | Status: SHIPPED | OUTPATIENT
Start: 2025-01-05

## 2025-01-03 RX ORDER — DOXYCYCLINE 100 MG/1
100 CAPSULE ORAL 2 TIMES DAILY
Qty: 60 CAPSULE | Refills: 1 | Status: SHIPPED | OUTPATIENT
Start: 2025-01-03 | End: 2025-03-04

## 2025-01-03 NOTE — PROGRESS NOTES
"Subjective   Patient ID: Kay Pike is a 59 y.o. female who presents for Follow-up (Sx started 3 1/2 weeks ago. Congestion and sinus pressure. Dec 23 felt like it went to her chest, SOB ).    Kay is seen for c/o cough, congestion, SOB. Started with sinus symptoms 3+ weeks ago.   Now with ongoing cough and SOB, trouble sleeping. Cough is productive. Denies fever currently but has felt it on and off.     Also with ongoing lymphedema for years. Has had numerous wounds and blisters from recurrent edema. She has tried compression, elevation, diuretics for the last 2 years in addition to trial of exercise. Has trouble with compression at times due to skin sensitivity as she has had history of psoriasis.          Review of Systems   All other systems reviewed and are negative.      Objective   /64   Pulse 85   Ht 1.575 m (5' 2\")   Wt 115 kg (254 lb)   SpO2 95%   BMI 46.46 kg/m²     Physical Exam  Constitutional:       Appearance: Normal appearance. She is obese.   HENT:      Right Ear: Tympanic membrane normal.      Left Ear: Tympanic membrane normal.      Mouth/Throat:      Pharynx: Oropharynx is clear.   Cardiovascular:      Rate and Rhythm: Normal rate and regular rhythm.      Heart sounds: Normal heart sounds.   Pulmonary:      Breath sounds: Rhonchi present.   Skin:     Comments: B/L LE lymphedema stage 3 with acanthosis, thickening hypertrophic skin, warty appearance. Psoriatic plaques present. Nonpitting. Calf circumference 60-63cm B/L   Neurological:      Mental Status: She is alert.         Assessment/Plan   Diagnoses and all orders for this visit:  Type 2 diabetes mellitus without complication, without long-term current use of insulin (Multi)  -     POCT glycosylated hemoglobin (Hb A1C) manually resulted  -     losartan (Cozaar) 50 mg tablet; Take 1 tablet (50 mg) by mouth once daily.  -     metFORMIN (Glucophage) 500 mg tablet; Take 1 tablet (500 mg) by mouth 2 times daily (morning and late " afternoon).  -     simvastatin (Zocor) 40 mg tablet; Take 1 tablet (40 mg) by mouth once every 24 hours.  -     tirzepatide (Mounjaro) 5 mg/0.5 mL pen injector; Inject 5 mg under the skin 1 (one) time per week.  Lymphedema of both lower extremities  STEMI (ST elevation myocardial infarction) (Multi)  -     clopidogrel (Plavix) 75 mg tablet; Take 1 tablet (75 mg) by mouth once daily.  Acute systolic heart failure  -     dapagliflozin propanediol (Farxiga) 10 mg; Take 1 tablet (10 mg) by mouth once daily.  -     metoprolol succinate XL (Toprol-XL) 50 mg 24 hr tablet; Take 1 tablet (50 mg) by mouth 2 times a day. Do not crush or chew.  Anxiety  -     escitalopram (Lexapro) 10 mg tablet; Take 1 tablet (10 mg) by mouth once daily.  COPD (chronic obstructive pulmonary disease) with acute bronchitis (Multi)  -     fluticasone furoate-vilanteroL (Breo Ellipta) 200-25 mcg/dose inhaler; Inhale 1 puff once daily.  -     pantoprazole (ProtoNix) 40 mg EC tablet; Take 1 tablet (40 mg) by mouth once daily in the morning. Take before meals. Do not crush, chew, or split.  Hypothyroidism, unspecified type  -     levothyroxine (Synthroid, Levoxyl) 100 mcg tablet; Take 1 tablet (100 mcg) by mouth once daily in the morning. Take before meals. Take on an empty stomach  Bilateral leg edema  -     furosemide (Lasix) 40 mg tablet; Take 1 tablet (40 mg) by mouth 2 times a day.  -     potassium chloride ER (Micro-K) 10 mEq ER capsule; Take 2 capsules (20 mEq) by mouth once daily. Do not crush or chew.  Paroxysmal atrial fibrillation (Multi)  -     rivaroxaban (Xarelto) 20 mg tablet; Take 1 tablet (20 mg) by mouth once daily in the evening. Take with meals. Take with food.  Hidradenitis  -     doxycycline (Vibramycin) 100 mg capsule; Take 1 capsule (100 mg) by mouth 2 times a day. Take with at least 8 ounces (large glass) of water, do not lie down for 30 minutes after

## 2025-02-06 ENCOUNTER — OFFICE VISIT (OUTPATIENT)
Dept: CARDIOLOGY | Facility: CLINIC | Age: 60
End: 2025-02-06
Payer: COMMERCIAL

## 2025-02-22 LAB
ANION GAP SERPL CALCULATED.4IONS-SCNC: 8 MMOL/L (CALC) (ref 7–17)
BUN SERPL-MCNC: 21 MG/DL (ref 7–25)
BUN/CREAT SERPL: NORMAL (CALC) (ref 6–22)
CALCIUM SERPL-MCNC: 9.2 MG/DL (ref 8.6–10.4)
CHLORIDE SERPL-SCNC: 104 MMOL/L (ref 98–110)
CHOLEST SERPL-MCNC: 111 MG/DL
CHOLEST/HDLC SERPL: 2.6 (CALC)
CO2 SERPL-SCNC: 29 MMOL/L (ref 20–32)
CREAT SERPL-MCNC: 0.78 MG/DL (ref 0.5–1.03)
EGFRCR SERPLBLD CKD-EPI 2021: 89 ML/MIN/1.73M2
GLUCOSE SERPL-MCNC: 121 MG/DL (ref 65–139)
HDLC SERPL-MCNC: 42 MG/DL
LDLC SERPL CALC-MCNC: 50 MG/DL (CALC)
NONHDLC SERPL-MCNC: 69 MG/DL (CALC)
POTASSIUM SERPL-SCNC: 3.8 MMOL/L (ref 3.5–5.3)
SODIUM SERPL-SCNC: 141 MMOL/L (ref 135–146)
TRIGL SERPL-MCNC: 100 MG/DL

## 2025-02-25 ENCOUNTER — OFFICE VISIT (OUTPATIENT)
Dept: CARDIOLOGY | Facility: CLINIC | Age: 60
End: 2025-02-25
Payer: COMMERCIAL

## 2025-02-28 DIAGNOSIS — J20.9 COPD (CHRONIC OBSTRUCTIVE PULMONARY DISEASE) WITH ACUTE BRONCHITIS (MULTI): ICD-10-CM

## 2025-02-28 DIAGNOSIS — J44.0 COPD (CHRONIC OBSTRUCTIVE PULMONARY DISEASE) WITH ACUTE BRONCHITIS (MULTI): ICD-10-CM

## 2025-02-28 RX ORDER — TIOTROPIUM BROMIDE INHALATION SPRAY 3.12 UG/1
2 SPRAY, METERED RESPIRATORY (INHALATION) DAILY
Qty: 8 G | Refills: 0 | Status: SHIPPED | OUTPATIENT
Start: 2025-02-28

## 2025-03-12 DIAGNOSIS — L73.2 HIDRADENITIS: ICD-10-CM

## 2025-03-12 RX ORDER — DOXYCYCLINE 100 MG/1
100 CAPSULE ORAL 2 TIMES DAILY
Qty: 60 CAPSULE | Refills: 0 | Status: SHIPPED | OUTPATIENT
Start: 2025-03-12 | End: 2025-05-11

## 2025-04-03 DIAGNOSIS — J20.9 COPD (CHRONIC OBSTRUCTIVE PULMONARY DISEASE) WITH ACUTE BRONCHITIS (MULTI): ICD-10-CM

## 2025-04-03 DIAGNOSIS — J44.0 COPD (CHRONIC OBSTRUCTIVE PULMONARY DISEASE) WITH ACUTE BRONCHITIS (MULTI): ICD-10-CM

## 2025-04-03 RX ORDER — HYDROXYZINE HYDROCHLORIDE 25 MG/1
TABLET, FILM COATED ORAL
Qty: 90 TABLET | Refills: 0 | Status: SHIPPED | OUTPATIENT
Start: 2025-04-03

## 2025-04-22 DIAGNOSIS — R60.0 BILATERAL LEG EDEMA: ICD-10-CM

## 2025-04-22 DIAGNOSIS — F41.9 ANXIETY: ICD-10-CM

## 2025-04-22 DIAGNOSIS — L73.2 HIDRADENITIS: ICD-10-CM

## 2025-04-22 RX ORDER — DOXYCYCLINE 100 MG/1
CAPSULE ORAL
Qty: 60 CAPSULE | Refills: 0 | Status: SHIPPED | OUTPATIENT
Start: 2025-04-22

## 2025-04-22 RX ORDER — FUROSEMIDE 40 MG/1
40 TABLET ORAL 2 TIMES DAILY
Qty: 90 TABLET | Refills: 0 | Status: SHIPPED | OUTPATIENT
Start: 2025-04-22

## 2025-04-22 RX ORDER — ESCITALOPRAM OXALATE 10 MG/1
10 TABLET ORAL DAILY
Qty: 90 TABLET | Refills: 0 | Status: SHIPPED | OUTPATIENT
Start: 2025-04-22

## 2025-05-06 ENCOUNTER — TELEPHONE (OUTPATIENT)
Dept: PRIMARY CARE | Facility: CLINIC | Age: 60
End: 2025-05-06
Payer: COMMERCIAL

## 2025-05-06 DIAGNOSIS — I50.21 ACUTE SYSTOLIC HEART FAILURE: ICD-10-CM

## 2025-05-06 RX ORDER — METOPROLOL SUCCINATE 50 MG/1
TABLET, EXTENDED RELEASE ORAL
Qty: 90 TABLET | Refills: 0 | Status: SHIPPED | OUTPATIENT
Start: 2025-05-06

## 2025-05-06 NOTE — TELEPHONE ENCOUNTER
Pt called stating she is on doxycycline (Vibramycin) 100 mg capsule but her cellulitis is acting up. Pts legs are hot to the touch and blisters. Pt asking for another antibiotic.

## 2025-05-21 ENCOUNTER — OFFICE VISIT (OUTPATIENT)
Dept: PRIMARY CARE | Facility: CLINIC | Age: 60
End: 2025-05-21
Payer: COMMERCIAL

## 2025-05-21 VITALS
WEIGHT: 256 LBS | BODY MASS INDEX: 47.11 KG/M2 | HEIGHT: 62 IN | OXYGEN SATURATION: 99 % | HEART RATE: 82 BPM | DIASTOLIC BLOOD PRESSURE: 78 MMHG | SYSTOLIC BLOOD PRESSURE: 128 MMHG

## 2025-05-21 DIAGNOSIS — R60.0 BILATERAL LEG EDEMA: ICD-10-CM

## 2025-05-21 DIAGNOSIS — J41.8 MIXED SIMPLE AND MUCOPURULENT CHRONIC BRONCHITIS (MULTI): ICD-10-CM

## 2025-05-21 DIAGNOSIS — L73.2 HIDRADENITIS: ICD-10-CM

## 2025-05-21 DIAGNOSIS — L03.115 CELLULITIS OF RIGHT LOWER EXTREMITY: ICD-10-CM

## 2025-05-21 DIAGNOSIS — J45.909 UNCOMPLICATED ASTHMA, UNSPECIFIED ASTHMA SEVERITY, UNSPECIFIED WHETHER PERSISTENT (HHS-HCC): ICD-10-CM

## 2025-05-21 DIAGNOSIS — E11.9 TYPE 2 DIABETES MELLITUS WITHOUT COMPLICATION, WITHOUT LONG-TERM CURRENT USE OF INSULIN: Primary | ICD-10-CM

## 2025-05-21 PROBLEM — J96.01 ACUTE HYPOXIC RESPIRATORY FAILURE: Status: RESOLVED | Noted: 2024-10-21 | Resolved: 2025-05-21

## 2025-05-21 LAB — POC HEMOGLOBIN A1C: 6.3 % (ref 4.2–6.5)

## 2025-05-21 PROCEDURE — 4010F ACE/ARB THERAPY RXD/TAKEN: CPT | Performed by: PHYSICIAN ASSISTANT

## 2025-05-21 PROCEDURE — 3074F SYST BP LT 130 MM HG: CPT | Performed by: PHYSICIAN ASSISTANT

## 2025-05-21 PROCEDURE — 99214 OFFICE O/P EST MOD 30 MIN: CPT | Performed by: PHYSICIAN ASSISTANT

## 2025-05-21 PROCEDURE — 3078F DIAST BP <80 MM HG: CPT | Performed by: PHYSICIAN ASSISTANT

## 2025-05-21 PROCEDURE — 3008F BODY MASS INDEX DOCD: CPT | Performed by: PHYSICIAN ASSISTANT

## 2025-05-21 PROCEDURE — 83036 HEMOGLOBIN GLYCOSYLATED A1C: CPT | Performed by: PHYSICIAN ASSISTANT

## 2025-05-21 PROCEDURE — 3044F HG A1C LEVEL LT 7.0%: CPT | Performed by: PHYSICIAN ASSISTANT

## 2025-05-21 RX ORDER — FUROSEMIDE 40 MG/1
80-120 TABLET ORAL DAILY
Qty: 270 TABLET | Refills: 1 | Status: SHIPPED | OUTPATIENT
Start: 2025-05-21

## 2025-05-21 RX ORDER — CEFUROXIME AXETIL 500 MG/1
500 TABLET ORAL 2 TIMES DAILY
Qty: 28 TABLET | Refills: 0 | Status: SHIPPED | OUTPATIENT
Start: 2025-05-21 | End: 2025-06-04

## 2025-05-21 ASSESSMENT — PAIN SCALES - GENERAL: PAINLEVEL_OUTOF10: 6

## 2025-05-23 RX ORDER — DOXYCYCLINE 100 MG/1
CAPSULE ORAL
Qty: 60 CAPSULE | Refills: 0 | Status: SHIPPED | OUTPATIENT
Start: 2025-05-23

## 2025-06-03 ENCOUNTER — TELEPHONE (OUTPATIENT)
Dept: PRIMARY CARE | Facility: CLINIC | Age: 60
End: 2025-06-03
Payer: COMMERCIAL

## 2025-06-03 DIAGNOSIS — L03.115 CELLULITIS OF RIGHT LOWER EXTREMITY: Primary | ICD-10-CM

## 2025-06-03 NOTE — TELEPHONE ENCOUNTER
Last seen: 05/21/25 - cellulitis/both legs > Pt states her legs are still seeping - x2 days left of antibiotic - med has somewhat helped; still seeping; skin raw; warm to touch; no fever      >She would like to know if there is anything else she can try, or start another round of antibiotic > Please Advise

## 2025-06-05 RX ORDER — CLINDAMYCIN HYDROCHLORIDE 300 MG/1
300 CAPSULE ORAL 3 TIMES DAILY
Qty: 30 CAPSULE | Refills: 0 | Status: SHIPPED | OUTPATIENT
Start: 2025-06-05 | End: 2025-06-15

## 2025-06-12 ENCOUNTER — APPOINTMENT (OUTPATIENT)
Dept: PRIMARY CARE | Facility: CLINIC | Age: 60
End: 2025-06-12
Payer: COMMERCIAL

## 2025-06-24 DIAGNOSIS — L03.115 CELLULITIS OF RIGHT LOWER EXTREMITY: ICD-10-CM

## 2025-06-25 RX ORDER — CLINDAMYCIN HYDROCHLORIDE 300 MG/1
CAPSULE ORAL
Qty: 30 CAPSULE | Refills: 0 | Status: SHIPPED | OUTPATIENT
Start: 2025-06-25

## 2025-07-15 DIAGNOSIS — I50.21 ACUTE SYSTOLIC HEART FAILURE: ICD-10-CM

## 2025-07-16 RX ORDER — METOPROLOL SUCCINATE 50 MG/1
TABLET, EXTENDED RELEASE ORAL
Qty: 90 TABLET | Refills: 0 | Status: SHIPPED | OUTPATIENT
Start: 2025-07-16

## 2025-07-17 DIAGNOSIS — L03.115 CELLULITIS OF RIGHT LOWER EXTREMITY: ICD-10-CM

## 2025-07-18 RX ORDER — CLINDAMYCIN HYDROCHLORIDE 300 MG/1
300 CAPSULE ORAL
Qty: 30 CAPSULE | Refills: 0 | Status: SHIPPED | OUTPATIENT
Start: 2025-07-18 | End: 2025-07-28

## 2025-07-23 ENCOUNTER — PATIENT OUTREACH (OUTPATIENT)
Dept: CARE COORDINATION | Facility: CLINIC | Age: 60
End: 2025-07-23
Payer: COMMERCIAL

## 2025-07-23 ENCOUNTER — OFFICE VISIT (OUTPATIENT)
Dept: PRIMARY CARE | Facility: CLINIC | Age: 60
End: 2025-07-23
Payer: COMMERCIAL

## 2025-07-23 VITALS
HEART RATE: 79 BPM | DIASTOLIC BLOOD PRESSURE: 68 MMHG | WEIGHT: 237 LBS | HEIGHT: 62 IN | SYSTOLIC BLOOD PRESSURE: 122 MMHG | OXYGEN SATURATION: 97 % | BODY MASS INDEX: 43.61 KG/M2

## 2025-07-23 DIAGNOSIS — L03.115 CELLULITIS OF RIGHT LOWER EXTREMITY: Primary | ICD-10-CM

## 2025-07-23 DIAGNOSIS — Z12.31 ENCOUNTER FOR SCREENING MAMMOGRAM FOR BREAST CANCER: ICD-10-CM

## 2025-07-23 PROCEDURE — 3044F HG A1C LEVEL LT 7.0%: CPT | Performed by: PHYSICIAN ASSISTANT

## 2025-07-23 PROCEDURE — 3078F DIAST BP <80 MM HG: CPT | Performed by: PHYSICIAN ASSISTANT

## 2025-07-23 PROCEDURE — 3074F SYST BP LT 130 MM HG: CPT | Performed by: PHYSICIAN ASSISTANT

## 2025-07-23 PROCEDURE — 3008F BODY MASS INDEX DOCD: CPT | Performed by: PHYSICIAN ASSISTANT

## 2025-07-23 PROCEDURE — 4010F ACE/ARB THERAPY RXD/TAKEN: CPT | Performed by: PHYSICIAN ASSISTANT

## 2025-07-23 PROCEDURE — 99214 OFFICE O/P EST MOD 30 MIN: CPT | Performed by: PHYSICIAN ASSISTANT

## 2025-07-23 RX ORDER — LEVOFLOXACIN 500 MG/1
500 TABLET, FILM COATED ORAL DAILY
Qty: 10 TABLET | Refills: 0 | Status: SHIPPED | OUTPATIENT
Start: 2025-07-23 | End: 2025-08-02

## 2025-07-23 ASSESSMENT — PATIENT HEALTH QUESTIONNAIRE - PHQ9
2. FEELING DOWN, DEPRESSED OR HOPELESS: NOT AT ALL
SUM OF ALL RESPONSES TO PHQ9 QUESTIONS 1 AND 2: 0
1. LITTLE INTEREST OR PLEASURE IN DOING THINGS: NOT AT ALL

## 2025-07-23 ASSESSMENT — PAIN SCALES - GENERAL: PAINLEVEL_OUTOF10: 2

## 2025-07-23 NOTE — PROGRESS NOTES
"Subjective   Patient ID: Kay Pike is a 60 y.o. female who presents for Cellulitis.    Presents for c/o leg edema and blistering. Recently started clindamycin which has helped a bit. She continues with significant drainage to LLE.   Denies fever, any other concerns.            Review of Systems   All other systems reviewed and are negative.      Objective   /68   Pulse 79   Ht 1.575 m (5' 2\")   Wt 108 kg (237 lb)   SpO2 97%   BMI 43.35 kg/m²     Physical Exam  Constitutional:       Appearance: Normal appearance.     Cardiovascular:      Rate and Rhythm: Normal rate and regular rhythm.     Musculoskeletal:      Comments: B/L LE lymphedema and blistering; L leg with redness and warmth, tender      Neurological:      Mental Status: She is alert.       L leg re-wrapped with sterile oil emulsion dressing compressed    Assessment/Plan   Diagnoses and all orders for this visit:  Cellulitis of right lower extremity  -     levoFLOXacin (Levaquin) 500 mg tablet; Take 1 tablet (500 mg) by mouth once daily for 10 days.      Cont to elevated.   Recommend wound care consult.   Return 2 weeks     "

## 2025-08-04 DIAGNOSIS — L03.115 CELLULITIS OF RIGHT LOWER EXTREMITY: ICD-10-CM

## 2025-08-08 RX ORDER — LEVOFLOXACIN 500 MG/1
500 TABLET, FILM COATED ORAL DAILY
Qty: 10 TABLET | Refills: 0 | Status: SHIPPED | OUTPATIENT
Start: 2025-08-08 | End: 2025-08-18

## 2025-08-13 ENCOUNTER — APPOINTMENT (OUTPATIENT)
Dept: PRIMARY CARE | Facility: CLINIC | Age: 60
End: 2025-08-13
Payer: COMMERCIAL

## (undated) DEVICE — INTRODUCER SHEATH, GLIDESHEATH, 6FR 25CM

## (undated) DEVICE — PACK, ANGIO P2, CUSTOM, LAKE

## (undated) DEVICE — CATHETER, OPTITORQUE, 5FR, TIG, 1H/100CM

## (undated) DEVICE — CATHETER, EXPO, MODEL-D, 6FR PIG 6-HOLE

## (undated) DEVICE — GUIDEWIRE, J TIP, 3 MM, 0.035 IN X 260 CM, PTFE

## (undated) DEVICE — KIT, NAMIC STANDARD LEFT HEART, CUSTOM, LWMC

## (undated) DEVICE — TR BAND, RADIAL COMPRESSION, LONG, 29CM